# Patient Record
Sex: MALE | Race: BLACK OR AFRICAN AMERICAN | Employment: UNEMPLOYED | URBAN - NONMETROPOLITAN AREA
[De-identification: names, ages, dates, MRNs, and addresses within clinical notes are randomized per-mention and may not be internally consistent; named-entity substitution may affect disease eponyms.]

---

## 2019-11-13 ENCOUNTER — HOSPITAL ENCOUNTER (EMERGENCY)
Age: 29
Discharge: HOME | End: 2019-11-14
Attending: EMERGENCY MEDICINE

## 2019-11-13 DIAGNOSIS — R11.0 NAUSEA: ICD-10-CM

## 2019-11-13 DIAGNOSIS — J10.1 INFLUENZA DUE TO INFLUENZA VIRUS, TYPE B: Primary | ICD-10-CM

## 2019-11-13 PROCEDURE — 99283 EMERGENCY DEPT VISIT LOW MDM: CPT

## 2019-11-13 ASSESSMENT — PAIN SCALES - GENERAL: PAINLEVEL_OUTOF10: 8

## 2019-11-13 ASSESSMENT — PAIN DESCRIPTION - PAIN TYPE: TYPE: ACUTE PAIN

## 2019-11-13 ASSESSMENT — PAIN DESCRIPTION - LOCATION: LOCATION: ABDOMEN

## 2019-11-14 ENCOUNTER — HOSPITAL ENCOUNTER (EMERGENCY)
Age: 29
Discharge: HOME | End: 2019-11-14
Attending: EMERGENCY MEDICINE

## 2019-11-14 VITALS
RESPIRATION RATE: 16 BRPM | BODY MASS INDEX: 29.22 KG/M2 | TEMPERATURE: 99.9 F | HEIGHT: 76 IN | WEIGHT: 240 LBS | HEART RATE: 92 BPM | SYSTOLIC BLOOD PRESSURE: 131 MMHG | DIASTOLIC BLOOD PRESSURE: 83 MMHG | OXYGEN SATURATION: 98 %

## 2019-11-14 VITALS
TEMPERATURE: 98.9 F | HEIGHT: 76 IN | HEART RATE: 91 BPM | RESPIRATION RATE: 16 BRPM | OXYGEN SATURATION: 97 % | BODY MASS INDEX: 29.22 KG/M2 | SYSTOLIC BLOOD PRESSURE: 118 MMHG | DIASTOLIC BLOOD PRESSURE: 94 MMHG | WEIGHT: 240 LBS

## 2019-11-14 DIAGNOSIS — J11.1 INFLUENZA WITH RESPIRATORY MANIFESTATION OTHER THAN PNEUMONIA: Primary | ICD-10-CM

## 2019-11-14 LAB
DIRECT EXAM: ABNORMAL
Lab: ABNORMAL
SPECIMEN DESCRIPTION: ABNORMAL

## 2019-11-14 PROCEDURE — 99282 EMERGENCY DEPT VISIT SF MDM: CPT

## 2019-11-14 PROCEDURE — 6370000000 HC RX 637 (ALT 250 FOR IP): Performed by: EMERGENCY MEDICINE

## 2019-11-14 PROCEDURE — 87804 INFLUENZA ASSAY W/OPTIC: CPT

## 2019-11-14 RX ORDER — ONDANSETRON 4 MG/1
4 TABLET, ORALLY DISINTEGRATING ORAL ONCE
Status: COMPLETED | OUTPATIENT
Start: 2019-11-14 | End: 2019-11-14

## 2019-11-14 RX ORDER — OSELTAMIVIR PHOSPHATE 75 MG/1
75 CAPSULE ORAL 2 TIMES DAILY
Qty: 10 CAPSULE | Refills: 1 | Status: SHIPPED | OUTPATIENT
Start: 2019-11-14 | End: 2019-11-19

## 2019-11-14 RX ORDER — ONDANSETRON 4 MG/1
4 TABLET, ORALLY DISINTEGRATING ORAL EVERY 8 HOURS PRN
Qty: 12 TABLET | Refills: 0 | Status: SHIPPED | OUTPATIENT
Start: 2019-11-14 | End: 2020-12-15

## 2019-11-14 RX ADMIN — ONDANSETRON 4 MG: 4 TABLET, ORALLY DISINTEGRATING ORAL at 00:11

## 2019-11-14 ASSESSMENT — PAIN DESCRIPTION - FREQUENCY: FREQUENCY: CONTINUOUS

## 2019-11-14 ASSESSMENT — PAIN SCALES - GENERAL: PAINLEVEL_OUTOF10: 7

## 2019-11-14 ASSESSMENT — ENCOUNTER SYMPTOMS
VOMITING: 0
NAUSEA: 1
COUGH: 1

## 2020-02-21 ENCOUNTER — APPOINTMENT (OUTPATIENT)
Dept: GENERAL RADIOLOGY | Age: 30
End: 2020-02-21
Payer: OTHER GOVERNMENT

## 2020-02-21 ENCOUNTER — HOSPITAL ENCOUNTER (EMERGENCY)
Age: 30
Discharge: HOME | End: 2020-02-21
Attending: EMERGENCY MEDICINE
Payer: OTHER GOVERNMENT

## 2020-02-21 VITALS
BODY MASS INDEX: 29.22 KG/M2 | OXYGEN SATURATION: 100 % | SYSTOLIC BLOOD PRESSURE: 146 MMHG | DIASTOLIC BLOOD PRESSURE: 102 MMHG | RESPIRATION RATE: 14 BRPM | WEIGHT: 240 LBS | HEIGHT: 76 IN | TEMPERATURE: 97.1 F | HEART RATE: 79 BPM

## 2020-02-21 LAB
-: ABNORMAL
ABSOLUTE EOS #: 0.05 K/UL (ref 0–0.44)
ABSOLUTE IMMATURE GRANULOCYTE: <0.03 K/UL (ref 0–0.3)
ABSOLUTE LYMPH #: 1.3 K/UL (ref 1.1–3.7)
ABSOLUTE MONO #: 0.9 K/UL (ref 0.1–1.2)
ALBUMIN SERPL-MCNC: 5.1 G/DL (ref 3.5–5.2)
ALBUMIN/GLOBULIN RATIO: 1.5 (ref 1–2.5)
ALP BLD-CCNC: 71 U/L (ref 40–129)
ALT SERPL-CCNC: 28 U/L (ref 5–41)
AMORPHOUS: ABNORMAL
ANION GAP SERPL CALCULATED.3IONS-SCNC: 14 MMOL/L (ref 9–17)
AST SERPL-CCNC: 22 U/L
BACTERIA: ABNORMAL
BASOPHILS # BLD: 0 % (ref 0–2)
BASOPHILS ABSOLUTE: <0.03 K/UL (ref 0–0.2)
BILIRUB SERPL-MCNC: 1.33 MG/DL (ref 0.3–1.2)
BILIRUBIN URINE: NEGATIVE
BUN BLDV-MCNC: 13 MG/DL (ref 6–20)
BUN/CREAT BLD: 11 (ref 9–20)
CALCIUM SERPL-MCNC: 9.9 MG/DL (ref 8.6–10.4)
CASTS UA: ABNORMAL /LPF (ref 0–2)
CHLORIDE BLD-SCNC: 96 MMOL/L (ref 98–107)
CO2: 28 MMOL/L (ref 20–31)
COLOR: ABNORMAL
COMMENT UA: ABNORMAL
CREAT SERPL-MCNC: 1.22 MG/DL (ref 0.7–1.2)
CRYSTALS, UA: ABNORMAL /HPF
DIFFERENTIAL TYPE: ABNORMAL
EOSINOPHILS RELATIVE PERCENT: 1 % (ref 1–4)
EPITHELIAL CELLS UA: ABNORMAL /HPF (ref 0–5)
GFR AFRICAN AMERICAN: >60 ML/MIN
GFR NON-AFRICAN AMERICAN: >60 ML/MIN
GFR SERPL CREATININE-BSD FRML MDRD: ABNORMAL ML/MIN/{1.73_M2}
GFR SERPL CREATININE-BSD FRML MDRD: ABNORMAL ML/MIN/{1.73_M2}
GLUCOSE BLD-MCNC: 105 MG/DL (ref 70–99)
GLUCOSE URINE: NEGATIVE
HCT VFR BLD CALC: 41.3 % (ref 40.7–50.3)
HEMOGLOBIN: 14.1 G/DL (ref 13–17)
IMMATURE GRANULOCYTES: 0 %
KETONES, URINE: NEGATIVE
LACTIC ACID: 1.7 MMOL/L (ref 0.5–2.2)
LEUKOCYTE ESTERASE, URINE: ABNORMAL
LIPASE: 17 U/L (ref 13–60)
LYMPHOCYTES # BLD: 16 % (ref 24–43)
MCH RBC QN AUTO: 28.5 PG (ref 25.2–33.5)
MCHC RBC AUTO-ENTMCNC: 34.1 G/DL (ref 25.2–33.5)
MCV RBC AUTO: 83.6 FL (ref 82.6–102.9)
MONOCYTES # BLD: 11 % (ref 3–12)
MUCUS: ABNORMAL
NITRITE, URINE: NEGATIVE
NRBC AUTOMATED: 0 PER 100 WBC
OTHER OBSERVATIONS UA: ABNORMAL
PDW BLD-RTO: 13.1 % (ref 11.8–14.4)
PH UA: 6 (ref 5–6)
PLATELET # BLD: 257 K/UL (ref 138–453)
PLATELET ESTIMATE: ABNORMAL
PMV BLD AUTO: 10.7 FL (ref 8.1–13.5)
POTASSIUM SERPL-SCNC: 3.7 MMOL/L (ref 3.7–5.3)
PROTEIN UA: NEGATIVE
RBC # BLD: 4.94 M/UL (ref 4.21–5.77)
RBC # BLD: ABNORMAL 10*6/UL
RBC UA: ABNORMAL /HPF (ref 0–4)
RENAL EPITHELIAL, UA: ABNORMAL /HPF
SEG NEUTROPHILS: 72 % (ref 36–65)
SEGMENTED NEUTROPHILS ABSOLUTE COUNT: 5.8 K/UL (ref 1.5–8.1)
SODIUM BLD-SCNC: 138 MMOL/L (ref 135–144)
SPECIFIC GRAVITY UA: 1 (ref 1.01–1.02)
TOTAL PROTEIN: 8.5 G/DL (ref 6.4–8.3)
TRICHOMONAS: ABNORMAL
TURBIDITY: ABNORMAL
URINE HGB: NEGATIVE
UROBILINOGEN, URINE: NORMAL
WBC # BLD: 8.1 K/UL (ref 3.5–11.3)
WBC # BLD: ABNORMAL 10*3/UL
WBC UA: ABNORMAL /HPF (ref 0–4)
YEAST: ABNORMAL

## 2020-02-21 PROCEDURE — 85025 COMPLETE CBC W/AUTO DIFF WBC: CPT

## 2020-02-21 PROCEDURE — 83690 ASSAY OF LIPASE: CPT

## 2020-02-21 PROCEDURE — 83605 ASSAY OF LACTIC ACID: CPT

## 2020-02-21 PROCEDURE — 99284 EMERGENCY DEPT VISIT MOD MDM: CPT

## 2020-02-21 PROCEDURE — 96374 THER/PROPH/DIAG INJ IV PUSH: CPT

## 2020-02-21 PROCEDURE — 4500000002 HC ER NO CHARGE

## 2020-02-21 PROCEDURE — 2500000003 HC RX 250 WO HCPCS: Performed by: EMERGENCY MEDICINE

## 2020-02-21 PROCEDURE — 87591 N.GONORRHOEAE DNA AMP PROB: CPT

## 2020-02-21 PROCEDURE — 96375 TX/PRO/DX INJ NEW DRUG ADDON: CPT

## 2020-02-21 PROCEDURE — 96376 TX/PRO/DX INJ SAME DRUG ADON: CPT

## 2020-02-21 PROCEDURE — 74022 RADEX COMPL AQT ABD SERIES: CPT

## 2020-02-21 PROCEDURE — 80053 COMPREHEN METABOLIC PANEL: CPT

## 2020-02-21 PROCEDURE — 81001 URINALYSIS AUTO W/SCOPE: CPT

## 2020-02-21 PROCEDURE — 87491 CHLMYD TRACH DNA AMP PROBE: CPT

## 2020-02-21 PROCEDURE — 6360000002 HC RX W HCPCS: Performed by: EMERGENCY MEDICINE

## 2020-02-21 PROCEDURE — 2580000003 HC RX 258: Performed by: EMERGENCY MEDICINE

## 2020-02-21 RX ORDER — ONDANSETRON 2 MG/ML
4 INJECTION INTRAMUSCULAR; INTRAVENOUS ONCE
Status: COMPLETED | OUTPATIENT
Start: 2020-02-21 | End: 2020-02-21

## 2020-02-21 RX ORDER — FAMOTIDINE 20 MG/1
20 TABLET, FILM COATED ORAL 2 TIMES DAILY
Qty: 28 TABLET | Refills: 0 | Status: SHIPPED | OUTPATIENT
Start: 2020-02-21 | End: 2020-12-15

## 2020-02-21 RX ORDER — PROMETHAZINE HYDROCHLORIDE 6.25 MG/5ML
25 SYRUP ORAL EVERY 6 HOURS PRN
Qty: 240 ML | Refills: 0 | Status: SHIPPED | OUTPATIENT
Start: 2020-02-21 | End: 2020-02-28

## 2020-02-21 RX ORDER — 0.9 % SODIUM CHLORIDE 0.9 %
1000 INTRAVENOUS SOLUTION INTRAVENOUS ONCE
Status: COMPLETED | OUTPATIENT
Start: 2020-02-21 | End: 2020-02-21

## 2020-02-21 RX ADMIN — ONDANSETRON 4 MG: 2 INJECTION INTRAMUSCULAR; INTRAVENOUS at 22:31

## 2020-02-21 RX ADMIN — ONDANSETRON 4 MG: 2 INJECTION INTRAMUSCULAR; INTRAVENOUS at 21:19

## 2020-02-21 RX ADMIN — FAMOTIDINE 20 MG: 10 INJECTION, SOLUTION INTRAVENOUS at 21:23

## 2020-02-21 RX ADMIN — SODIUM CHLORIDE 1000 ML: 9 INJECTION, SOLUTION INTRAVENOUS at 21:19

## 2020-02-21 ASSESSMENT — PAIN DESCRIPTION - LOCATION: LOCATION: ABDOMEN

## 2020-02-21 ASSESSMENT — PAIN DESCRIPTION - ORIENTATION: ORIENTATION: LEFT

## 2020-02-21 ASSESSMENT — PAIN SCALES - GENERAL: PAINLEVEL_OUTOF10: 6

## 2020-02-21 ASSESSMENT — PAIN DESCRIPTION - PAIN TYPE: TYPE: ACUTE PAIN

## 2020-02-21 ASSESSMENT — PAIN DESCRIPTION - FREQUENCY: FREQUENCY: INTERMITTENT

## 2020-02-21 ASSESSMENT — PAIN DESCRIPTION - DESCRIPTORS: DESCRIPTORS: ACHING

## 2020-02-22 ASSESSMENT — ENCOUNTER SYMPTOMS
SHORTNESS OF BREATH: 0
NAUSEA: 1
VOMITING: 1

## 2020-02-22 NOTE — ED PROVIDER NOTES
[facility] ED  [address]  [place] [address]  Phone: [phone]  eMERGENCY dEPARTMENT eNCOUnter      Pt Name: Ina Edmonds  MRN: [id number]  [place] 1990  Date of evaluation: 2/22/20      CHIEF COMPLAINT     Chief Complaint   Patient presents with    Emesis    Cough    Abdominal Pain         HISTORY OF PRESENT ILLNESS    Ina Edmonds is a 30 y.o. male who presents today with complaints of multiple episodes of emesis and some abdominal discomfort. He also states that he had a cough however he describes this more as hiccuping previous to his emesis episodes. Patient endorses a history of Crohn's and has had a colectomy in the past.  Patient is not currently under a GI specialist's care as he recently moved from [place]. Patient states that he was not supposed to be on medication for his Crohn's disease at this time. Patient was seen at [facility] earlier today and diagnosed with a viral syndrome. Patient does have Zofran at home but did not take that previous to going to the emergency department. He reports that he also has a prescription for liquid Phenergan which tends to work better for him but he is out of that prescription. He does not have any fevers no productive cough no chest pain or difficulty breathing. Previous history of abdominal surgeries. He denies any pain or swelling in the private area. He denies any UTI symptoms. Patient states does not feel like his previous Crohn's flareups. Has not noticed any blood in his stool. REVIEW OF SYSTEMS     Review of Systems   Constitutional: Negative for fever. HENT: Negative for congestion. Respiratory: Negative for shortness of breath. Cardiovascular: Negative for chest pain. Gastrointestinal: Positive for nausea and vomiting. Skin: Negative for rash. Neurological: Negative for syncope. Psychiatric/Behavioral: Negative for confusion.    All other systems reviewed and are negative. PAST MEDICAL HISTORY    has a past medical history of Crohn's colitis ([facility] [address] SURGICAL HISTORY      has a past surgical history that includes Colon surgery. CURRENT MEDICATIONS       Discharge Medication List as of 2/21/2020 10:59 PM      CONTINUE these medications which have NOT CHANGED    Details   ondansetron (ZOFRAN ODT) 4 MG disintegrating tablet Take 1 tablet by mouth every 8 hours as needed for Nausea, Disp-12 tablet, R-0Print             ALLERGIES     has No Known Allergies. FAMILY HISTORY     has no family status information on file. family history is not on file. SOCIAL HISTORY      reports that he has been smoking cigarettes. He has been smoking about 0.50 packs per day. He has never used smokeless tobacco. He reports current alcohol use. He reports previous drug use. PHYSICAL EXAM     INITIAL VITALS:  height is 6' 4\" (1.93 m) and weight is 240 lb (108.9 kg). His tympanic temperature is 97.1 °F (36.2 °C). His blood pressure is 146/102 (abnormal) and his pulse is 79. His respiration is 14 and oxygen saturation is 100%. Physical Exam  Vitals signs reviewed. Constitutional:       Appearance: Normal appearance. HENT:      Head: Normocephalic. Mouth/Throat:      Mouth: Mucous membranes are moist.   Eyes:      Extraocular Movements: Extraocular movements intact. Pupils: Pupils are equal, round, and reactive to light. Cardiovascular:      Rate and Rhythm: Normal rate and regular rhythm. Heart sounds: Normal heart sounds. Pulmonary:      Effort: Pulmonary effort is normal.      Breath sounds: Normal breath sounds. No wheezing. Abdominal:      Palpations: Abdomen is soft. Tenderness: There is no abdominal tenderness. There is no guarding or rebound. Musculoskeletal: Normal range of motion. Right lower leg: No edema. Left lower leg: No edema. Skin:     General: Skin is warm and dry.       Capillary Refill: Capillary refill takes less Morphology NOT REPORTED     Platelet Estimate NOT REPORTED     Seg Neutrophils 72 (H) 36 - 65 %    Lymphocytes 16 (L) 24 - 43 %    Monocytes 11 3 - 12 %    Eosinophils % 1 1 - 4 %    Basophils 0 0 - 2 %    Immature Granulocytes 0 0 %    Segs Absolute 5.80 1.50 - 8.10 k/uL    Absolute Lymph # 1.30 1.10 - 3.70 k/uL    Absolute Mono # 0.90 0.10 - 1.20 k/uL    Absolute Eos # 0.05 0.00 - 0.44 k/uL    Basophils Absolute <0.03 0.00 - 0.20 k/uL    Absolute Immature Granulocyte <0.03 0.00 - 0.30 k/uL   Comprehensive Metabolic Panel   Result Value Ref Range    Glucose 105 (H) 70 - 99 mg/dL    BUN 13 6 - 20 mg/dL    CREATININE 1.22 (H) 0.70 - 1.20 mg/dL    Bun/Cre Ratio 11 9 - 20    Calcium 9.9 8.6 - 10.4 mg/dL    Sodium 138 135 - 144 mmol/L    Potassium 3.7 3.7 - 5.3 mmol/L    Chloride 96 (L) 98 - 107 mmol/L    CO2 28 20 - 31 mmol/L    Anion Gap 14 9 - 17 mmol/L    Alkaline Phosphatase 71 40 - 129 U/L    ALT 28 5 - 41 U/L    AST 22 <40 U/L    Total Bilirubin 1.33 (H) 0.3 - 1.2 mg/dL    Total Protein 8.5 (H) 6.4 - 8.3 g/dL    Alb 5.1 3.5 - 5.2 g/dL    Albumin/Globulin Ratio 1.5 1.0 - 2.5    GFR Non-African American >60 >60 mL/min    GFR African American >60 >60 mL/min    GFR Comment          GFR Staging NOT REPORTED    Lipase   Result Value Ref Range    Lipase 17 13 - 60 U/L   Lactic Acid   Result Value Ref Range    Lactic Acid 1.7 0.5 - 2.2 mmol/L   Urinalysis Reflex to Culture   Result Value Ref Range    Color, UA NOT REPORTED YELLOW    Turbidity UA NOT REPORTED CLEAR    Glucose, Ur NEGATIVE NEGATIVE    Bilirubin Urine NEGATIVE NEGATIVE    Ketones, Urine NEGATIVE NEGATIVE    Specific Gravity, UA 1.005 (L) 1.010 - 1.025    Urine Hgb NEGATIVE NEGATIVE    pH, UA 6.0 5.0 - 6.0    Protein, UA NEGATIVE NEGATIVE    Urobilinogen, Urine Normal Normal    Nitrite, Urine NEGATIVE NEGATIVE    Leukocyte Esterase, Urine TRACE (A) NEGATIVE    Urinalysis Comments NOT REPORTED    Microscopic Urinalysis   Result Value Ref Range    - WBC, UA 0 TO 4 0 - 4 /HPF    RBC, UA 0 TO 4 0 - 4 /HPF    Casts UA NOT REPORTED 0 - 2 /LPF    Crystals UA NOT REPORTED None /HPF    Epithelial Cells UA 0 TO 4 0 - 5 /HPF    Renal Epithelial, Urine NOT REPORTED 0 /HPF    Bacteria, UA TRACE (A) None    Mucus, UA 1+ (A) None    Trichomonas, UA NOT REPORTED None    Amorphous, UA NOT REPORTED None    Other Observations UA NOT REPORTED NOT REQ. Yeast, UA NOT REPORTED None       EMERGENCY DEPARTMENT COURSE:   Vitals:    Vitals:    02/21/20 2028 02/21/20 2126 02/21/20 2305   BP: (!) 140/96 (!) 158/89 (!) 146/102   Pulse: 81 80 79   Resp: 16 16 14   Temp: 97.1 °F (36.2 °C)     TempSrc: Tympanic     SpO2: 100% 100% 100%   Weight: 240 lb (108.9 kg)     Height: 6' 4\" (1.93 m)       -------------------------  BP: (!) 146/102, Temp: 97.1 °F (36.2 °C), Pulse: 79, Resp: 14      CONSULTS:  None    PROCEDURES:  None    FINAL IMPRESSION      1.  Nausea and vomiting, intractability of vomiting not specified, unspecified vomiting type          DISPOSITION/PLAN   DISPOSITION Decision To Discharge 02/21/2020 10:57:07 PM      PATIENT REFERRED TO:  [facility] [address] [id number] [phone]  In 2 days        DISCHARGE MEDICATIONS:  Discharge Medication List as of 2/21/2020 10:59 PM      START taking these medications    Details   promethazine (PHENERGAN) 6.25 MG/5ML syrup Take 20 mLs by mouth every 6 hours as needed for Nausea, Disp-240 mL, R-0Print      famotidine (PEPCID) 20 MG tablet Take 1 tablet by mouth 2 times daily for 14 days, Disp-28 tablet, R-0Print               (Please note that portions of this note were completed with avoice recognition program.  Efforts were made to edit the dictations but occasionally words are mis-transcribed.)    Deepak Carolina MD, [address] [employer] [facility] [facility]  Attending Emergency Medicine Physician        Deepak Carolina MD  02/22/20 0765

## 2020-02-24 LAB
C. TRACHOMATIS DNA ,URINE: NEGATIVE
N. GONORRHOEAE DNA, URINE: NEGATIVE
SPECIMEN DESCRIPTION: NORMAL

## 2020-03-18 ENCOUNTER — HOSPITAL ENCOUNTER (EMERGENCY)
Age: 30
Discharge: HOME | End: 2020-03-18
Attending: EMERGENCY MEDICINE
Payer: OTHER GOVERNMENT

## 2020-03-18 ENCOUNTER — APPOINTMENT (OUTPATIENT)
Dept: CT IMAGING | Age: 30
End: 2020-03-18
Payer: OTHER GOVERNMENT

## 2020-03-18 ENCOUNTER — APPOINTMENT (OUTPATIENT)
Dept: ULTRASOUND IMAGING | Age: 30
End: 2020-03-18
Payer: OTHER GOVERNMENT

## 2020-03-18 VITALS
BODY MASS INDEX: 28.01 KG/M2 | HEIGHT: 76 IN | SYSTOLIC BLOOD PRESSURE: 139 MMHG | TEMPERATURE: 97.8 F | HEART RATE: 67 BPM | OXYGEN SATURATION: 100 % | DIASTOLIC BLOOD PRESSURE: 88 MMHG | RESPIRATION RATE: 16 BRPM | WEIGHT: 230 LBS

## 2020-03-18 LAB
ABSOLUTE EOS #: 0.21 K/UL (ref 0–0.44)
ABSOLUTE IMMATURE GRANULOCYTE: <0.03 K/UL (ref 0–0.3)
ABSOLUTE LYMPH #: 1.34 K/UL (ref 1.1–3.7)
ABSOLUTE MONO #: 0.58 K/UL (ref 0.1–1.2)
ALBUMIN SERPL-MCNC: 4.6 G/DL (ref 3.5–5.2)
ALBUMIN/GLOBULIN RATIO: 1.5 (ref 1–2.5)
ALP BLD-CCNC: 68 U/L (ref 40–129)
ALT SERPL-CCNC: 36 U/L (ref 5–41)
ANION GAP SERPL CALCULATED.3IONS-SCNC: 14 MMOL/L (ref 9–17)
AST SERPL-CCNC: 22 U/L
BASOPHILS # BLD: 0 % (ref 0–2)
BASOPHILS ABSOLUTE: <0.03 K/UL (ref 0–0.2)
BILIRUB SERPL-MCNC: 1.08 MG/DL (ref 0.3–1.2)
BUN BLDV-MCNC: 9 MG/DL (ref 6–20)
BUN/CREAT BLD: 10 (ref 9–20)
CALCIUM SERPL-MCNC: 9.9 MG/DL (ref 8.6–10.4)
CHLORIDE BLD-SCNC: 95 MMOL/L (ref 98–107)
CO2: 27 MMOL/L (ref 20–31)
CREAT SERPL-MCNC: 0.91 MG/DL (ref 0.7–1.2)
DIFFERENTIAL TYPE: ABNORMAL
EOSINOPHILS RELATIVE PERCENT: 4 % (ref 1–4)
GFR AFRICAN AMERICAN: >60 ML/MIN
GFR NON-AFRICAN AMERICAN: >60 ML/MIN
GFR SERPL CREATININE-BSD FRML MDRD: ABNORMAL ML/MIN/{1.73_M2}
GFR SERPL CREATININE-BSD FRML MDRD: ABNORMAL ML/MIN/{1.73_M2}
GLUCOSE BLD-MCNC: 114 MG/DL (ref 70–99)
HCT VFR BLD CALC: 42.5 % (ref 40.7–50.3)
HEMOGLOBIN: 14.5 G/DL (ref 13–17)
IMMATURE GRANULOCYTES: 0 %
LIPASE: 14 U/L (ref 13–60)
LYMPHOCYTES # BLD: 25 % (ref 24–43)
MCH RBC QN AUTO: 28.5 PG (ref 25.2–33.5)
MCHC RBC AUTO-ENTMCNC: 34.1 G/DL (ref 25.2–33.5)
MCV RBC AUTO: 83.5 FL (ref 82.6–102.9)
MONOCYTES # BLD: 11 % (ref 3–12)
NRBC AUTOMATED: 0 PER 100 WBC
PDW BLD-RTO: 12.9 % (ref 11.8–14.4)
PLATELET # BLD: 264 K/UL (ref 138–453)
PLATELET ESTIMATE: ABNORMAL
PMV BLD AUTO: 10.5 FL (ref 8.1–13.5)
POTASSIUM SERPL-SCNC: 3.9 MMOL/L (ref 3.7–5.3)
RBC # BLD: 5.09 M/UL (ref 4.21–5.77)
RBC # BLD: ABNORMAL 10*6/UL
SEG NEUTROPHILS: 60 % (ref 36–65)
SEGMENTED NEUTROPHILS ABSOLUTE COUNT: 3.18 K/UL (ref 1.5–8.1)
SODIUM BLD-SCNC: 136 MMOL/L (ref 135–144)
TOTAL PROTEIN: 7.7 G/DL (ref 6.4–8.3)
WBC # BLD: 5.4 K/UL (ref 3.5–11.3)
WBC # BLD: ABNORMAL 10*3/UL

## 2020-03-18 PROCEDURE — 80053 COMPREHEN METABOLIC PANEL: CPT

## 2020-03-18 PROCEDURE — 74177 CT ABD & PELVIS W/CONTRAST: CPT

## 2020-03-18 PROCEDURE — 6360000004 HC RX CONTRAST MEDICATION: Performed by: EMERGENCY MEDICINE

## 2020-03-18 PROCEDURE — 85025 COMPLETE CBC W/AUTO DIFF WBC: CPT

## 2020-03-18 PROCEDURE — 6360000002 HC RX W HCPCS: Performed by: EMERGENCY MEDICINE

## 2020-03-18 PROCEDURE — 96376 TX/PRO/DX INJ SAME DRUG ADON: CPT

## 2020-03-18 PROCEDURE — 2580000003 HC RX 258: Performed by: EMERGENCY MEDICINE

## 2020-03-18 PROCEDURE — 76705 ECHO EXAM OF ABDOMEN: CPT

## 2020-03-18 PROCEDURE — 2500000003 HC RX 250 WO HCPCS: Performed by: EMERGENCY MEDICINE

## 2020-03-18 PROCEDURE — 96375 TX/PRO/DX INJ NEW DRUG ADDON: CPT

## 2020-03-18 PROCEDURE — 96374 THER/PROPH/DIAG INJ IV PUSH: CPT

## 2020-03-18 PROCEDURE — 99284 EMERGENCY DEPT VISIT MOD MDM: CPT

## 2020-03-18 PROCEDURE — 83690 ASSAY OF LIPASE: CPT

## 2020-03-18 RX ORDER — PREDNISONE 10 MG/1
10 TABLET ORAL SEE ADMIN INSTRUCTIONS
Qty: 30 TABLET | Refills: 0 | Status: SHIPPED | OUTPATIENT
Start: 2020-03-18 | End: 2020-03-28

## 2020-03-18 RX ORDER — MORPHINE SULFATE 4 MG/ML
4 INJECTION, SOLUTION INTRAMUSCULAR; INTRAVENOUS ONCE
Status: COMPLETED | OUTPATIENT
Start: 2020-03-18 | End: 2020-03-18

## 2020-03-18 RX ORDER — OXYCODONE HYDROCHLORIDE AND ACETAMINOPHEN 5; 325 MG/1; MG/1
1 TABLET ORAL EVERY 6 HOURS PRN
Qty: 12 TABLET | Refills: 0 | Status: SHIPPED | OUTPATIENT
Start: 2020-03-18 | End: 2020-03-21

## 2020-03-18 RX ORDER — ONDANSETRON 4 MG/1
4 TABLET, FILM COATED ORAL EVERY 8 HOURS PRN
Qty: 15 TABLET | Refills: 0 | Status: SHIPPED | OUTPATIENT
Start: 2020-03-18 | End: 2020-03-23

## 2020-03-18 RX ORDER — ONDANSETRON 2 MG/ML
4 INJECTION INTRAMUSCULAR; INTRAVENOUS ONCE
Status: COMPLETED | OUTPATIENT
Start: 2020-03-18 | End: 2020-03-18

## 2020-03-18 RX ORDER — KETOROLAC TROMETHAMINE 30 MG/ML
15 INJECTION, SOLUTION INTRAMUSCULAR; INTRAVENOUS ONCE
Status: COMPLETED | OUTPATIENT
Start: 2020-03-18 | End: 2020-03-18

## 2020-03-18 RX ORDER — METHYLPREDNISOLONE SODIUM SUCCINATE 125 MG/2ML
125 INJECTION, POWDER, LYOPHILIZED, FOR SOLUTION INTRAMUSCULAR; INTRAVENOUS ONCE
Status: COMPLETED | OUTPATIENT
Start: 2020-03-18 | End: 2020-03-18

## 2020-03-18 RX ORDER — 0.9 % SODIUM CHLORIDE 0.9 %
1000 INTRAVENOUS SOLUTION INTRAVENOUS ONCE
Status: COMPLETED | OUTPATIENT
Start: 2020-03-18 | End: 2020-03-18

## 2020-03-18 RX ADMIN — KETOROLAC TROMETHAMINE 15 MG: 30 INJECTION, SOLUTION INTRAMUSCULAR at 12:07

## 2020-03-18 RX ADMIN — SODIUM CHLORIDE 1000 ML: 9 INJECTION, SOLUTION INTRAVENOUS at 12:05

## 2020-03-18 RX ADMIN — MORPHINE SULFATE 4 MG: 4 INJECTION, SOLUTION INTRAMUSCULAR; INTRAVENOUS at 13:19

## 2020-03-18 RX ADMIN — FAMOTIDINE 20 MG: 10 INJECTION, SOLUTION INTRAVENOUS at 12:13

## 2020-03-18 RX ADMIN — METHYLPREDNISOLONE SODIUM SUCCINATE 125 MG: 125 INJECTION, POWDER, FOR SOLUTION INTRAMUSCULAR; INTRAVENOUS at 13:23

## 2020-03-18 RX ADMIN — MORPHINE SULFATE 4 MG: 4 INJECTION, SOLUTION INTRAMUSCULAR; INTRAVENOUS at 12:10

## 2020-03-18 RX ADMIN — IOPAMIDOL 100 ML: 755 INJECTION, SOLUTION INTRAVENOUS at 12:48

## 2020-03-18 RX ADMIN — ONDANSETRON 4 MG: 2 INJECTION INTRAMUSCULAR; INTRAVENOUS at 12:05

## 2020-03-18 ASSESSMENT — PAIN DESCRIPTION - PROGRESSION: CLINICAL_PROGRESSION: GRADUALLY WORSENING

## 2020-03-18 ASSESSMENT — ENCOUNTER SYMPTOMS
DIARRHEA: 0
NAUSEA: 1
SORE THROAT: 0
ABDOMINAL PAIN: 1
SHORTNESS OF BREATH: 0
VOMITING: 1

## 2020-03-18 ASSESSMENT — PAIN SCALES - GENERAL
PAINLEVEL_OUTOF10: 9
PAINLEVEL_OUTOF10: 8
PAINLEVEL_OUTOF10: 9
PAINLEVEL_OUTOF10: 4
PAINLEVEL_OUTOF10: 4
PAINLEVEL_OUTOF10: 9

## 2020-03-18 ASSESSMENT — PAIN DESCRIPTION - FREQUENCY: FREQUENCY: CONTINUOUS

## 2020-03-18 ASSESSMENT — PAIN DESCRIPTION - LOCATION: LOCATION: ABDOMEN

## 2020-03-18 ASSESSMENT — PAIN DESCRIPTION - PAIN TYPE: TYPE: ACUTE PAIN

## 2020-03-18 ASSESSMENT — PAIN - FUNCTIONAL ASSESSMENT: PAIN_FUNCTIONAL_ASSESSMENT: 0-10

## 2020-03-18 ASSESSMENT — PAIN DESCRIPTION - DESCRIPTORS: DESCRIPTORS: SHARP;ACHING

## 2020-03-18 ASSESSMENT — PAIN DESCRIPTION - ONSET: ONSET: ON-GOING

## 2020-03-18 NOTE — ED PROVIDER NOTES
[facility] ED  [address]  [place] [address]  Phone: [address] [place] [facility]  eMERGENCY dEPARTMENT eNCOUnter      Pt Name: Santy Cervantes  MRN: [id number]  [place] 1990  Date of evaluation: 3/18/2020  Provider: Nanda Callahna DO    CHIEF COMPLAINT       Chief Complaint   Patient presents with    Abdominal Pain     onset \"2 days ago, been vomiting too\"         HISTORY OF PRESENT ILLNESS   (Location/Symptom, Timing/Onset,Context/Setting, Quality, Duration, Modifying Factors, Severity)  Note limiting factors. Santy Cervantes is a 30 y.o. male who presents to the emergency department for evaluation of abdominal pain. Patient states this started approximately 2 days ago, it is been fairly persistent. He notices pain/cramping in the lower abdomen, he is also having nausea and vomiting. No blood in vomit. No diarrhea. No fever. Patient does have history of Crohn's disease and he had been on immunomodulators in the past but it has been years. Patient has been doing well, last flareup was approximately 1 to 2 years ago according to patient. Patient does have history of small bowel resection secondary to Crohn's ileitis. Patient is not taking any medicine at home for the pain. Patient denies dysuria or hematuria. Nursing Notes were reviewed. REVIEW OF SYSTEMS    (2-9systems for level 4, 10 or more for level 5)     Review of Systems   Constitutional: Negative for fever. HENT: Negative for sore throat. Respiratory: Negative for shortness of breath. Cardiovascular: Negative for chest pain. Gastrointestinal: Positive for abdominal pain, nausea and vomiting. Negative for diarrhea. Genitourinary: Negative for dysuria. Skin: Negative for rash. Neurological: Negative for weakness. All other systems reviewed and are negative. Except asnoted above the remainder of the review of systems was reviewed and negative.        PAST MEDICAL 1137]   BP Temp Temp Source Pulse Resp SpO2 Height Weight   (!) 151/87 97.8 °F (36.6 °C) Tympanic 71 16 98 % 6' 4\" (1.93 m) 230 lb (104.3 kg)       Physical Exam  Vitals signs and nursing note reviewed. Constitutional:       General: He is not in acute distress. Appearance: Normal appearance. He is not ill-appearing or toxic-appearing. HENT:      Head: Normocephalic and atraumatic. Nose: Nose normal. No congestion. Mouth/Throat:      Mouth: Mucous membranes are moist.   Eyes:      General:         Right eye: No discharge. Left eye: No discharge. Conjunctiva/sclera: Conjunctivae normal.   Neck:      Musculoskeletal: Normal range of motion. Cardiovascular:      Rate and Rhythm: Normal rate and regular rhythm. Pulses: Normal pulses. Heart sounds: Normal heart sounds. No murmur. Pulmonary:      Effort: Pulmonary effort is normal. No respiratory distress. Breath sounds: Normal breath sounds. No wheezing. Abdominal:      General: Abdomen is flat. There is no distension. Palpations: Abdomen is soft. There is no mass. Tenderness: There is abdominal tenderness. There is no guarding or rebound. Comments: Patient has very mild lower abdominal tenderness in the left and right lower quadrants. No McBurney's point tenderness. Seems to be a little bit more tender on the right than the left. Musculoskeletal: Normal range of motion. General: No deformity or signs of injury. Skin:     General: Skin is warm and dry. Capillary Refill: Capillary refill takes less than 2 seconds. Findings: No rash. Neurological:      General: No focal deficit present. Mental Status: He is alert. Mental status is at baseline. Motor: No weakness. Comments: Speaking normally. No facial asymmetry. Moving all 4 extremities. Normal gait.     Psychiatric:         Mood and Affect: Mood normal.         EMERGENCY DEPARTMENT COURSE and DIFFERENTIAL DIAGNOSIS/MDM:   Vitals:    Vitals:    03/18/20 1137 03/18/20 1257   BP: (!) 151/87 133/62   Pulse: 71 62   Resp: 16 16   Temp: 97.8 °F (36.6 °C)    TempSrc: Tympanic    SpO2: 98% 98%   Weight: 230 lb (104.3 kg)    Height: 6' 4\" (1.93 m)        Patient presents to the emergency department with the complaints described above. Vital signs show slight hypertension but are otherwise unremarkable. Physical examination reveals the findings discussed above. At this time, differential diagnosis is broad and could include a Crohn's flareup, could include other underlying ileitis or colitis, lower suspicion for appendicitis, pancreatitis or vascular abnormality. I am going to obtain blood work and a CT scan with contrast of the abdomen and pelvis. I will order urinalysis as well. I ordered IV fluids, IV pain meds, IV antiemetics, IV antacid and I will reevaluate      DIAGNOSTIC RESULTS     LABS:  Labs Reviewed   CBC WITH AUTO DIFFERENTIAL - Abnormal; Notable for the following components:       Result Value    MCHC 34.1 (*)     All other components within normal limits   COMPREHENSIVE METABOLIC PANEL - Abnormal; Notable for the following components:    Glucose 114 (*)     Chloride 95 (*)     All other components within normal limits   LIPASE   URINE RT REFLEX TO CULTURE       All other labs were within normal range or not returned as of this dictation. RADIOLOGY:  US GALLBLADDER RUQ   Final Result   1. No fluid is seen along the right hemidiaphragm. Finding on the CT study   likely represents asymmetrical muscular thickening of the right hemidiaphragm. 2. Unremarkable right upper quadrant ultrasound. CT ABDOMEN PELVIS W IV CONTRAST Additional Contrast? None   Final Result   1. There is questionable asymmetrical thickening of the right hemidiaphragm   when compared to the left.   If underlying loculated fluid or possibly   hemoperitoneum adjacent to the liver is of clinical concern, further   evaluation with ultrasound is recommended. Otherwise unremarkable study. ED Course as of Mar 18 1453   Wed Mar 18, 2020   1317 Reevaluation patient has no significant improvement. I have ordered another dose of pain medication I will give some steroids. Ordered a right upper quadrant ultrasound based on some thickening of the right hemidiaphragm on CT scan    [TS]   1449 Ultrasound unremarkable. At this time, patient is going to be sent home with short course of steroid and pain medication. Advised to follow-up with PCP and establish GI. Of course he can return if worse. At this time the patient is without objective evidence of an acute process requiring hospitalization or inpatient management. They have remained hemodynamically stable and are stable for discharge with outpatient follow-up. Standard anticipatory guidance given to patient upon discharge. Have given them a specific time frame in which to follow-up and who to follow-up with. I have also advised them that they should return to the emergency department if they get worse, or not getting better or develop any new or concerning symptoms. Patient demonstrates understanding.    [TS]      ED Course User Index  [TS] Mynor Page, DO         PROCEDURES:  Unless otherwise noted below, none     Procedures    FINAL IMPRESSION      1. Exacerbation of Crohn's disease without complication ([facility] [address]    2. Lower abdominal pain          DISPOSITION/PLAN   DISPOSITION        PATIENT REFERRED TO:  Establish a Primary physician  If you do not have a primary care physician or you are looking for a new physician, please contact the following number [phone] to establish one.           DISCHARGE MEDICATIONS:  New Prescriptions    ONDANSETRON (ZOFRAN) 4 MG TABLET    Take 1 tablet by mouth every 8 hours as needed for Nausea or Vomiting    OXYCODONE-ACETAMINOPHEN (PERCOCET) 5-325 MG PER TABLET    Take 1 tablet by mouth every 6 hours as needed for Pain for up to 3 days. Intended supply: 3 days. Take lowest dose possible to manage pain    PREDNISONE (DELTASONE) 10 MG TABLET    Take 1 tablet by mouth See Admin Instructions for 10 days Take 4 tablets by mouth daily for days 1-4   Take 3 tablets by mouth daily for days 5-7   Take 2 tablets by mouth daily days 8 and 9   Take 1 tablet by mouth on day 10     Periodic Controlled Substance Monitoring: Possible medication side effects, risk of tolerance/dependence & alternative treatments discussed., No signs of potential drug abuse or diversion identified.  (Nanda Callahan DO)    (Please note that portions of this note were completed with a voice recognition program.  Efforts were made to edit the dictations but occasionally words are mis-transcribed.)    Nanda Callahan DO (electronically signed)  Board Certified Emergency Physician         Nanda Callahan DO  03/18/20 [id number]

## 2020-04-12 ENCOUNTER — HOSPITAL ENCOUNTER (EMERGENCY)
Age: 30
Discharge: HOME | End: 2020-04-12
Attending: EMERGENCY MEDICINE
Payer: OTHER GOVERNMENT

## 2020-04-12 ENCOUNTER — APPOINTMENT (OUTPATIENT)
Dept: GENERAL RADIOLOGY | Age: 30
End: 2020-04-12
Payer: OTHER GOVERNMENT

## 2020-04-12 VITALS
WEIGHT: 220 LBS | DIASTOLIC BLOOD PRESSURE: 71 MMHG | BODY MASS INDEX: 26.79 KG/M2 | RESPIRATION RATE: 14 BRPM | TEMPERATURE: 97.2 F | HEART RATE: 66 BPM | SYSTOLIC BLOOD PRESSURE: 118 MMHG | OXYGEN SATURATION: 100 % | HEIGHT: 76 IN

## 2020-04-12 LAB
ABSOLUTE EOS #: 0.19 K/UL (ref 0–0.44)
ABSOLUTE IMMATURE GRANULOCYTE: <0.03 K/UL (ref 0–0.3)
ABSOLUTE LYMPH #: 1.12 K/UL (ref 1.1–3.7)
ABSOLUTE MONO #: 0.49 K/UL (ref 0.1–1.2)
ALBUMIN SERPL-MCNC: 4.6 G/DL (ref 3.5–5.2)
ALBUMIN/GLOBULIN RATIO: 1.5 (ref 1–2.5)
ALP BLD-CCNC: 67 U/L (ref 40–129)
ALT SERPL-CCNC: 43 U/L (ref 5–41)
AMYLASE: 53 U/L (ref 28–100)
ANION GAP SERPL CALCULATED.3IONS-SCNC: 15 MMOL/L (ref 9–17)
AST SERPL-CCNC: 22 U/L
BASOPHILS # BLD: 1 % (ref 0–2)
BASOPHILS ABSOLUTE: 0.03 K/UL (ref 0–0.2)
BILIRUB SERPL-MCNC: 1.24 MG/DL (ref 0.3–1.2)
BUN BLDV-MCNC: 9 MG/DL (ref 6–20)
BUN/CREAT BLD: 9 (ref 9–20)
CALCIUM SERPL-MCNC: 9.7 MG/DL (ref 8.6–10.4)
CHLORIDE BLD-SCNC: 100 MMOL/L (ref 98–107)
CO2: 24 MMOL/L (ref 20–31)
CREAT SERPL-MCNC: 0.96 MG/DL (ref 0.7–1.2)
DIFFERENTIAL TYPE: ABNORMAL
EOSINOPHILS RELATIVE PERCENT: 3 % (ref 1–4)
GFR AFRICAN AMERICAN: >60 ML/MIN
GFR NON-AFRICAN AMERICAN: >60 ML/MIN
GFR SERPL CREATININE-BSD FRML MDRD: ABNORMAL ML/MIN/{1.73_M2}
GFR SERPL CREATININE-BSD FRML MDRD: ABNORMAL ML/MIN/{1.73_M2}
GLUCOSE BLD-MCNC: 130 MG/DL (ref 70–99)
HCT VFR BLD CALC: 42.7 % (ref 40.7–50.3)
HEMOGLOBIN: 14.3 G/DL (ref 13–17)
IMMATURE GRANULOCYTES: 0 %
LACTIC ACID: 3 MMOL/L (ref 0.5–2.2)
LIPASE: 19 U/L (ref 13–60)
LYMPHOCYTES # BLD: 20 % (ref 24–43)
MCH RBC QN AUTO: 28.2 PG (ref 25.2–33.5)
MCHC RBC AUTO-ENTMCNC: 33.5 G/DL (ref 25.2–33.5)
MCV RBC AUTO: 84.2 FL (ref 82.6–102.9)
MONOCYTES # BLD: 9 % (ref 3–12)
NRBC AUTOMATED: 0 PER 100 WBC
PDW BLD-RTO: 13 % (ref 11.8–14.4)
PLATELET # BLD: 260 K/UL (ref 138–453)
PLATELET ESTIMATE: ABNORMAL
PMV BLD AUTO: 10.8 FL (ref 8.1–13.5)
POTASSIUM SERPL-SCNC: 3.9 MMOL/L (ref 3.7–5.3)
RBC # BLD: 5.07 M/UL (ref 4.21–5.77)
RBC # BLD: ABNORMAL 10*6/UL
SEG NEUTROPHILS: 68 % (ref 36–65)
SEGMENTED NEUTROPHILS ABSOLUTE COUNT: 3.87 K/UL (ref 1.5–8.1)
SODIUM BLD-SCNC: 139 MMOL/L (ref 135–144)
TOTAL PROTEIN: 7.7 G/DL (ref 6.4–8.3)
WBC # BLD: 5.7 K/UL (ref 3.5–11.3)
WBC # BLD: ABNORMAL 10*3/UL

## 2020-04-12 PROCEDURE — 80053 COMPREHEN METABOLIC PANEL: CPT

## 2020-04-12 PROCEDURE — 85025 COMPLETE CBC W/AUTO DIFF WBC: CPT

## 2020-04-12 PROCEDURE — 83605 ASSAY OF LACTIC ACID: CPT

## 2020-04-12 PROCEDURE — 96374 THER/PROPH/DIAG INJ IV PUSH: CPT

## 2020-04-12 PROCEDURE — 96375 TX/PRO/DX INJ NEW DRUG ADDON: CPT

## 2020-04-12 PROCEDURE — 83690 ASSAY OF LIPASE: CPT

## 2020-04-12 PROCEDURE — 82150 ASSAY OF AMYLASE: CPT

## 2020-04-12 PROCEDURE — 6360000002 HC RX W HCPCS: Performed by: EMERGENCY MEDICINE

## 2020-04-12 PROCEDURE — 2580000003 HC RX 258: Performed by: EMERGENCY MEDICINE

## 2020-04-12 PROCEDURE — 74022 RADEX COMPL AQT ABD SERIES: CPT

## 2020-04-12 PROCEDURE — 99284 EMERGENCY DEPT VISIT MOD MDM: CPT

## 2020-04-12 RX ORDER — PREDNISONE 10 MG/1
TABLET ORAL
Qty: 20 TABLET | Refills: 0 | Status: SHIPPED | OUTPATIENT
Start: 2020-04-12 | End: 2020-04-22

## 2020-04-12 RX ORDER — ONDANSETRON 2 MG/ML
4 INJECTION INTRAMUSCULAR; INTRAVENOUS ONCE
Status: COMPLETED | OUTPATIENT
Start: 2020-04-12 | End: 2020-04-12

## 2020-04-12 RX ORDER — SODIUM CHLORIDE, SODIUM LACTATE, POTASSIUM CHLORIDE, AND CALCIUM CHLORIDE .6; .31; .03; .02 G/100ML; G/100ML; G/100ML; G/100ML
1000 INJECTION, SOLUTION INTRAVENOUS ONCE
Status: COMPLETED | OUTPATIENT
Start: 2020-04-12 | End: 2020-04-12

## 2020-04-12 RX ORDER — OXYCODONE HYDROCHLORIDE AND ACETAMINOPHEN 5; 325 MG/1; MG/1
1 TABLET ORAL EVERY 6 HOURS PRN
Qty: 20 TABLET | Refills: 0 | Status: SHIPPED | OUTPATIENT
Start: 2020-04-12 | End: 2020-04-17

## 2020-04-12 RX ORDER — MORPHINE SULFATE 4 MG/ML
4 INJECTION, SOLUTION INTRAMUSCULAR; INTRAVENOUS ONCE
Status: COMPLETED | OUTPATIENT
Start: 2020-04-12 | End: 2020-04-12

## 2020-04-12 RX ORDER — KETOROLAC TROMETHAMINE 30 MG/ML
15 INJECTION, SOLUTION INTRAMUSCULAR; INTRAVENOUS ONCE
Status: COMPLETED | OUTPATIENT
Start: 2020-04-12 | End: 2020-04-12

## 2020-04-12 RX ORDER — 0.9 % SODIUM CHLORIDE 0.9 %
1000 INTRAVENOUS SOLUTION INTRAVENOUS ONCE
Status: COMPLETED | OUTPATIENT
Start: 2020-04-12 | End: 2020-04-12

## 2020-04-12 RX ADMIN — SODIUM CHLORIDE 1000 ML: 9 INJECTION, SOLUTION INTRAVENOUS at 10:25

## 2020-04-12 RX ADMIN — ONDANSETRON 4 MG: 2 INJECTION INTRAMUSCULAR; INTRAVENOUS at 10:26

## 2020-04-12 RX ADMIN — SODIUM CHLORIDE, POTASSIUM CHLORIDE, SODIUM LACTATE AND CALCIUM CHLORIDE 1000 ML: 600; 310; 30; 20 INJECTION, SOLUTION INTRAVENOUS at 11:37

## 2020-04-12 RX ADMIN — MORPHINE SULFATE 4 MG: 4 INJECTION, SOLUTION INTRAMUSCULAR; INTRAVENOUS at 10:29

## 2020-04-12 RX ADMIN — KETOROLAC TROMETHAMINE 15 MG: 30 INJECTION, SOLUTION INTRAMUSCULAR at 11:03

## 2020-04-12 ASSESSMENT — PAIN DESCRIPTION - DIRECTION: RADIATING_TOWARDS: ACROSS LOWER BACK

## 2020-04-12 ASSESSMENT — PAIN DESCRIPTION - PROGRESSION
CLINICAL_PROGRESSION: GRADUALLY IMPROVING
CLINICAL_PROGRESSION: GRADUALLY WORSENING

## 2020-04-12 ASSESSMENT — PAIN DESCRIPTION - ONSET
ONSET: ON-GOING
ONSET: ON-GOING

## 2020-04-12 ASSESSMENT — PAIN DESCRIPTION - LOCATION
LOCATION: ABDOMEN
LOCATION: ABDOMEN

## 2020-04-12 ASSESSMENT — PAIN DESCRIPTION - PAIN TYPE
TYPE: ACUTE PAIN
TYPE: ACUTE PAIN

## 2020-04-12 ASSESSMENT — PAIN DESCRIPTION - FREQUENCY
FREQUENCY: CONTINUOUS
FREQUENCY: CONTINUOUS

## 2020-04-12 ASSESSMENT — PAIN SCALES - GENERAL
PAINLEVEL_OUTOF10: 6
PAINLEVEL_OUTOF10: 8
PAINLEVEL_OUTOF10: 9

## 2020-04-12 ASSESSMENT — PAIN DESCRIPTION - DESCRIPTORS: DESCRIPTORS: SHARP;ACHING

## 2020-04-12 ASSESSMENT — PAIN DESCRIPTION - ORIENTATION: ORIENTATION: LEFT;LOWER

## 2020-04-12 NOTE — ED PROVIDER NOTES
[facility]  eMERGENCY dEPARTMENT eNCOUnter      Pt Name: Souleymane Beth  MRN: [id number]  [place] 1990  Date of evaluation: 4/12/2020      CHIEF COMPLAINT       Chief Complaint   Patient presents with    Abdominal Pain     pt reports hx crohn's     Diarrhea     started 2 days ago         HISTORY OF PRESENT ILLNESS      The patient presents with abdominal pain and diarrhea that started 2 days ago. He has a history of Crohn's disease. He is complaining of pain in the left lower quadrant. He has not been passing blood. He has had some mild nausea. He had surgery in the remote past.  The patient was seen in March for a Crohn's exacerbation. He was placed on steroids with improvement. He does not have a doctor for this currently. He is working on getting one. He has not had a fever. Nothing makes the symptoms better or worse otherwise. REVIEW OF SYSTEMS       All systems reviewed and negative unless noted in HPI. The patient denies fever or constitutional symptoms. Denies vision change. Denies any sore throat or rhinorrhea. Denies any neck pain or stiffness. Denies chest pain or shortness of breath. Nausea and LLQ abdominal pain with diarrhea. Denies any dysuria. Denies urinary frequency or hematuria. Denies musculoskeletal injury or pain. Denies any weakness, numbness or focal neurologic deficit. Denies any skin rash or edema. No recent psychiatric issues. No easy bruising or bleeding. Denies any polyuria, polydypsia or history of immunocompromise. PAST MEDICAL HISTORY    has a past medical history of Crohn's colitis ([address] [address] SURGICAL HISTORY      has a past surgical history that includes Colon surgery.     CURRENT MEDICATIONS       Previous Medications    FAMOTIDINE (PEPCID) 20 MG TABLET    Take 1 tablet by mouth 2 times daily for 14 days    ONDANSETRON (ZOFRAN ODT) 4 MG DISINTEGRATING TABLET    Take 1 tablet by mouth every 8 hours as needed process. Nonobstructive bowel gas pattern. Narrative:    EXAMINATION:  TWO XRAY VIEWS OF THE ABDOMEN AND SINGLE  XRAY VIEW OF THE CHEST    4/12/2020 10:42 am    COMPARISON:  Abdominal series performed 02/21/2020. HISTORY:  ORDERING SYSTEM PROVIDED HISTORY: Crohn's' dz  TECHNOLOGIST PROVIDED HISTORY:  Crohn's' dz  Reason for Exam: abdominal pain and diarrhea that started 2 days ago. [facility] has  a history of Crohn's disease. [facility] is complaining of pain in the left lower  quadrant. FINDINGS:  The lungs are without consolidation or effusion. [place] Kushal is no pneumothorax. The mediastinal structures are unremarkable.  The extrathoracic soft tissues  are unremarkable.     There is a nonobstructive bowel gas pattern. [place] Kushal is no intraperitoneal  free air.  There are no suspicious calcifications. Raymundo Kushal is no acute osseous  abnormality.  The extra-abdominal soft tissues are unremarkable.                      LABS:  Results for orders placed or performed during the hospital encounter of 04/12/20   CBC Auto Differential   Result Value Ref Range    WBC 5.7 3.5 - 11.3 k/uL    RBC 5.07 4.21 - 5.77 m/uL    Hemoglobin 14.3 13.0 - 17.0 g/dL    Hematocrit 42.7 40.7 - 50.3 %    MCV 84.2 82.6 - 102.9 fL    MCH 28.2 25.2 - 33.5 pg    MCHC 33.5 25.2 - 33.5 g/dL    RDW 13.0 11.8 - 14.4 %    Platelets 337 811 - 324 k/uL    MPV 10.8 8.1 - 13.5 fL    NRBC Automated 0.0 0.0 per 100 WBC    Differential Type NOT REPORTED     WBC Morphology NOT REPORTED     RBC Morphology NOT REPORTED     Platelet Estimate NOT REPORTED     Seg Neutrophils 68 (H) 36 - 65 %    Lymphocytes 20 (L) 24 - 43 %    Monocytes 9 3 - 12 %    Eosinophils % 3 1 - 4 %    Basophils 1 0 - 2 %    Immature Granulocytes 0 0 %    Segs Absolute 3.87 1.50 - 8.10 k/uL    Absolute Lymph # 1.12 1.10 - 3.70 k/uL    Absolute Mono # 0.49 0.10 - 1.20 k/uL    Absolute Eos # 0.19 0.00 - 0.44 k/uL    Basophils Absolute 0.03 0.00 - 0.20 k/uL    Absolute Immature Granulocyte <0.03

## 2020-04-13 ENCOUNTER — CARE COORDINATION (OUTPATIENT)
Dept: CARE COORDINATION | Age: 30
End: 2020-04-13

## 2020-04-13 NOTE — CARE COORDINATION
Simeon Fermin was seen at [facility] ER 4/12/2020- Abd pain, diarrhea. Hx of Crohn's Disease. He was given Prednisone and Percocet. He was advised to establish with PCP- Dr. Mimi Hensley and Surgeon Dr. Amadou Lipscomb.     4/13/2020- 12:50 pm Left message requesting return call re: Covid at Risk education. 4/13/2020- 5:27 pm left second message. 4/14/2020- 8:18 am Left third message requesting return call.

## 2020-05-10 ENCOUNTER — APPOINTMENT (OUTPATIENT)
Dept: GENERAL RADIOLOGY | Age: 30
End: 2020-05-10
Payer: MEDICAID

## 2020-05-10 ENCOUNTER — HOSPITAL ENCOUNTER (EMERGENCY)
Age: 30
Discharge: HOME | End: 2020-05-10
Attending: EMERGENCY MEDICINE
Payer: MEDICAID

## 2020-05-10 VITALS
SYSTOLIC BLOOD PRESSURE: 120 MMHG | DIASTOLIC BLOOD PRESSURE: 65 MMHG | OXYGEN SATURATION: 99 % | HEART RATE: 63 BPM | TEMPERATURE: 97 F | HEIGHT: 76 IN | RESPIRATION RATE: 16 BRPM | BODY MASS INDEX: 27.4 KG/M2 | WEIGHT: 225 LBS

## 2020-05-10 LAB
ABSOLUTE EOS #: 0.47 K/UL (ref 0–0.44)
ABSOLUTE IMMATURE GRANULOCYTE: <0.03 K/UL (ref 0–0.3)
ABSOLUTE LYMPH #: 1.84 K/UL (ref 1.1–3.7)
ABSOLUTE MONO #: 0.62 K/UL (ref 0.1–1.2)
ALBUMIN SERPL-MCNC: 4.4 G/DL (ref 3.5–5.2)
ALBUMIN/GLOBULIN RATIO: 1.8 (ref 1–2.5)
ALP BLD-CCNC: 68 U/L (ref 40–129)
ALT SERPL-CCNC: 16 U/L (ref 5–41)
AMYLASE: 49 U/L (ref 28–100)
ANION GAP SERPL CALCULATED.3IONS-SCNC: 12 MMOL/L (ref 9–17)
AST SERPL-CCNC: 14 U/L
BASOPHILS # BLD: 1 % (ref 0–2)
BASOPHILS ABSOLUTE: 0.03 K/UL (ref 0–0.2)
BILIRUB SERPL-MCNC: 0.56 MG/DL (ref 0.3–1.2)
BILIRUBIN DIRECT: 0.15 MG/DL
BILIRUBIN, INDIRECT: 0.41 MG/DL (ref 0–1)
BUN BLDV-MCNC: 6 MG/DL (ref 6–20)
BUN/CREAT BLD: 7 (ref 9–20)
CALCIUM SERPL-MCNC: 9.4 MG/DL (ref 8.6–10.4)
CHLORIDE BLD-SCNC: 99 MMOL/L (ref 98–107)
CO2: 28 MMOL/L (ref 20–31)
CREAT SERPL-MCNC: 0.85 MG/DL (ref 0.7–1.2)
DIFFERENTIAL TYPE: ABNORMAL
EOSINOPHILS RELATIVE PERCENT: 7 % (ref 1–4)
GFR AFRICAN AMERICAN: >60 ML/MIN
GFR NON-AFRICAN AMERICAN: >60 ML/MIN
GFR SERPL CREATININE-BSD FRML MDRD: ABNORMAL ML/MIN/{1.73_M2}
GFR SERPL CREATININE-BSD FRML MDRD: ABNORMAL ML/MIN/{1.73_M2}
GLOBULIN: 2.4 G/DL (ref 1.5–3.8)
GLUCOSE BLD-MCNC: 93 MG/DL (ref 70–99)
HCT VFR BLD CALC: 39.6 % (ref 40.7–50.3)
HEMOGLOBIN: 13.5 G/DL (ref 13–17)
IMMATURE GRANULOCYTES: 0 %
LACTIC ACID: 1.7 MMOL/L (ref 0.5–2.2)
LIPASE: 19 U/L (ref 13–60)
LYMPHOCYTES # BLD: 28 % (ref 24–43)
MCH RBC QN AUTO: 29 PG (ref 25.2–33.5)
MCHC RBC AUTO-ENTMCNC: 34.1 G/DL (ref 25.2–33.5)
MCV RBC AUTO: 85 FL (ref 82.6–102.9)
MONOCYTES # BLD: 10 % (ref 3–12)
NRBC AUTOMATED: 0 PER 100 WBC
PDW BLD-RTO: 12.9 % (ref 11.8–14.4)
PLATELET # BLD: 232 K/UL (ref 138–453)
PLATELET ESTIMATE: ABNORMAL
PMV BLD AUTO: 10.9 FL (ref 8.1–13.5)
POTASSIUM SERPL-SCNC: 3.8 MMOL/L (ref 3.7–5.3)
RBC # BLD: 4.66 M/UL (ref 4.21–5.77)
RBC # BLD: ABNORMAL 10*6/UL
SEG NEUTROPHILS: 54 % (ref 36–65)
SEGMENTED NEUTROPHILS ABSOLUTE COUNT: 3.51 K/UL (ref 1.5–8.1)
SODIUM BLD-SCNC: 139 MMOL/L (ref 135–144)
TOTAL PROTEIN: 6.8 G/DL (ref 6.4–8.3)
WBC # BLD: 6.5 K/UL (ref 3.5–11.3)
WBC # BLD: ABNORMAL 10*3/UL

## 2020-05-10 PROCEDURE — 96374 THER/PROPH/DIAG INJ IV PUSH: CPT

## 2020-05-10 PROCEDURE — 83690 ASSAY OF LIPASE: CPT

## 2020-05-10 PROCEDURE — 85025 COMPLETE CBC W/AUTO DIFF WBC: CPT

## 2020-05-10 PROCEDURE — 99284 EMERGENCY DEPT VISIT MOD MDM: CPT

## 2020-05-10 PROCEDURE — 80048 BASIC METABOLIC PNL TOTAL CA: CPT

## 2020-05-10 PROCEDURE — 96361 HYDRATE IV INFUSION ADD-ON: CPT

## 2020-05-10 PROCEDURE — 83605 ASSAY OF LACTIC ACID: CPT

## 2020-05-10 PROCEDURE — 2580000003 HC RX 258: Performed by: EMERGENCY MEDICINE

## 2020-05-10 PROCEDURE — 6370000000 HC RX 637 (ALT 250 FOR IP): Performed by: EMERGENCY MEDICINE

## 2020-05-10 PROCEDURE — 80076 HEPATIC FUNCTION PANEL: CPT

## 2020-05-10 PROCEDURE — 82150 ASSAY OF AMYLASE: CPT

## 2020-05-10 PROCEDURE — 96375 TX/PRO/DX INJ NEW DRUG ADDON: CPT

## 2020-05-10 PROCEDURE — 6360000002 HC RX W HCPCS: Performed by: EMERGENCY MEDICINE

## 2020-05-10 PROCEDURE — 74022 RADEX COMPL AQT ABD SERIES: CPT

## 2020-05-10 RX ORDER — ONDANSETRON 4 MG/1
4 TABLET, FILM COATED ORAL EVERY 8 HOURS PRN
Qty: 20 TABLET | Refills: 0 | Status: SHIPPED | OUTPATIENT
Start: 2020-05-10 | End: 2020-12-15

## 2020-05-10 RX ORDER — ONDANSETRON 2 MG/ML
4 INJECTION INTRAMUSCULAR; INTRAVENOUS ONCE
Status: COMPLETED | OUTPATIENT
Start: 2020-05-10 | End: 2020-05-10

## 2020-05-10 RX ORDER — PREDNISONE 20 MG/1
60 TABLET ORAL ONCE
Status: COMPLETED | OUTPATIENT
Start: 2020-05-10 | End: 2020-05-10

## 2020-05-10 RX ORDER — 0.9 % SODIUM CHLORIDE 0.9 %
1000 INTRAVENOUS SOLUTION INTRAVENOUS ONCE
Status: COMPLETED | OUTPATIENT
Start: 2020-05-10 | End: 2020-05-10

## 2020-05-10 RX ORDER — MORPHINE SULFATE 4 MG/ML
4 INJECTION, SOLUTION INTRAMUSCULAR; INTRAVENOUS ONCE
Status: COMPLETED | OUTPATIENT
Start: 2020-05-10 | End: 2020-05-10

## 2020-05-10 RX ORDER — KETOROLAC TROMETHAMINE 30 MG/ML
15 INJECTION, SOLUTION INTRAMUSCULAR; INTRAVENOUS ONCE
Status: COMPLETED | OUTPATIENT
Start: 2020-05-10 | End: 2020-05-10

## 2020-05-10 RX ORDER — PREDNISONE 50 MG/1
50 TABLET ORAL DAILY
Qty: 4 TABLET | Refills: 0 | Status: SHIPPED | OUTPATIENT
Start: 2020-05-10 | End: 2020-05-14

## 2020-05-10 RX ADMIN — SODIUM CHLORIDE 1000 ML: 9 INJECTION, SOLUTION INTRAVENOUS at 04:26

## 2020-05-10 RX ADMIN — ONDANSETRON 4 MG: 2 INJECTION INTRAMUSCULAR; INTRAVENOUS at 04:26

## 2020-05-10 RX ADMIN — KETOROLAC TROMETHAMINE 15 MG: 30 INJECTION, SOLUTION INTRAMUSCULAR at 04:27

## 2020-05-10 RX ADMIN — PREDNISONE 60 MG: 20 TABLET ORAL at 05:24

## 2020-05-10 RX ADMIN — MORPHINE SULFATE 4 MG: 4 INJECTION, SOLUTION INTRAMUSCULAR; INTRAVENOUS at 04:58

## 2020-05-10 ASSESSMENT — PAIN SCALES - GENERAL
PAINLEVEL_OUTOF10: 9
PAINLEVEL_OUTOF10: 7

## 2020-05-10 ASSESSMENT — PAIN DESCRIPTION - LOCATION: LOCATION: ABDOMEN

## 2020-05-10 ASSESSMENT — PAIN DESCRIPTION - ORIENTATION: ORIENTATION: MID

## 2020-05-10 NOTE — ED TRIAGE NOTES
Amb to ED 3 with steady gait per self. Reports mid abd pain for 3 days radiating to back, hx of Crohns and think this may be a flare up.

## 2020-05-10 NOTE — ED PROVIDER NOTES
history is not on file. SOCIAL HISTORY      reports that he has been smoking cigarettes. He has been smoking about 0.50 packs per day. He has never used smokeless tobacco. He reports current alcohol use. He reports previous drug use. PHYSICAL EXAM       ED Triage Vitals [05/10/20 0405]   BP Temp Temp Source Pulse Resp SpO2 Height Weight   (!) 157/90 97 °F (36.1 °C) Tympanic 61 18 100 % 6' 4\" (1.93 m) 225 lb (102.1 kg)       Constitutional: Alert, oriented x3, nontoxic, answering questions appropriately, acting properly for age, in no acute distress   HEENT: Extraocular muscles intact, mucus membranes dry  Neck: Trachea midline   Cardiovascular: Regular rhythm and rate no S3, S4, or murmurs   Respiratory: Clear to auscultation bilaterally no wheezes, rhonchi, rales, no respiratory distress no tachypnea no retractions no hypoxia  Gastrointestinal: Soft, mild diffuse tenderness, nondistended, diminished bowel sounds. No rebound, rigidity, or guarding. Musculoskeletal: No extremity pain or swelling   Neurologic: Moving all 4 extremities without difficulty there are no gross focal neurologic deficits   Skin: Warm and dry       DIFFERENTIAL DIAGNOSIS/ MDM:     IV fluids and labs. Pain control. Zofran.     DIAGNOSTIC RESULTS     EKG: All EKG's are interpreted by theEmergency Department Physician who either signs or Co-signs this chart in the absence of a cardiologist.        Not indicated unless otherwise documented above    LABS:  Results for orders placed or performed during the hospital encounter of 05/10/20   CBC Auto Differential   Result Value Ref Range    WBC 6.5 3.5 - 11.3 k/uL    RBC 4.66 4.21 - 5.77 m/uL    Hemoglobin 13.5 13.0 - 17.0 g/dL    Hematocrit 39.6 (L) 40.7 - 50.3 %    MCV 85.0 82.6 - 102.9 fL    MCH 29.0 25.2 - 33.5 pg    MCHC 34.1 (H) 25.2 - 33.5 g/dL    RDW 12.9 11.8 - 14.4 %    Platelets 987 205 - 760 k/uL    MPV 10.9 8.1 - 13.5 fL    NRBC Automated 0.0 0.0 per 100 WBC    Differential Type

## 2020-05-20 ENCOUNTER — HOSPITAL ENCOUNTER (EMERGENCY)
Age: 30
Discharge: HOME | End: 2020-05-21
Attending: EMERGENCY MEDICINE
Payer: MEDICAID

## 2020-05-20 LAB
ABSOLUTE EOS #: 0.17 K/UL (ref 0–0.44)
ABSOLUTE IMMATURE GRANULOCYTE: <0.03 K/UL (ref 0–0.3)
ABSOLUTE LYMPH #: 1.55 K/UL (ref 1.1–3.7)
ABSOLUTE MONO #: 0.64 K/UL (ref 0.1–1.2)
BASOPHILS # BLD: 0 % (ref 0–2)
BASOPHILS ABSOLUTE: <0.03 K/UL (ref 0–0.2)
DIFFERENTIAL TYPE: ABNORMAL
EOSINOPHILS RELATIVE PERCENT: 3 % (ref 1–4)
HCT VFR BLD CALC: 42.4 % (ref 40.7–50.3)
HEMOGLOBIN: 14.3 G/DL (ref 13–17)
IMMATURE GRANULOCYTES: 0 %
LYMPHOCYTES # BLD: 27 % (ref 24–43)
MCH RBC QN AUTO: 28.3 PG (ref 25.2–33.5)
MCHC RBC AUTO-ENTMCNC: 33.7 G/DL (ref 25.2–33.5)
MCV RBC AUTO: 83.8 FL (ref 82.6–102.9)
MONOCYTES # BLD: 11 % (ref 3–12)
NRBC AUTOMATED: 0 PER 100 WBC
PDW BLD-RTO: 13 % (ref 11.8–14.4)
PLATELET # BLD: 280 K/UL (ref 138–453)
PLATELET ESTIMATE: ABNORMAL
PMV BLD AUTO: 10.7 FL (ref 8.1–13.5)
RBC # BLD: 5.06 M/UL (ref 4.21–5.77)
RBC # BLD: ABNORMAL 10*6/UL
SEG NEUTROPHILS: 59 % (ref 36–65)
SEGMENTED NEUTROPHILS ABSOLUTE COUNT: 3.3 K/UL (ref 1.5–8.1)
WBC # BLD: 5.7 K/UL (ref 3.5–11.3)
WBC # BLD: ABNORMAL 10*3/UL

## 2020-05-20 PROCEDURE — 6370000000 HC RX 637 (ALT 250 FOR IP): Performed by: EMERGENCY MEDICINE

## 2020-05-20 PROCEDURE — 87651 STREP A DNA AMP PROBE: CPT

## 2020-05-20 PROCEDURE — 85025 COMPLETE CBC W/AUTO DIFF WBC: CPT

## 2020-05-20 PROCEDURE — 83690 ASSAY OF LIPASE: CPT

## 2020-05-20 PROCEDURE — 99284 EMERGENCY DEPT VISIT MOD MDM: CPT

## 2020-05-20 PROCEDURE — 36415 COLL VENOUS BLD VENIPUNCTURE: CPT

## 2020-05-20 PROCEDURE — 80053 COMPREHEN METABOLIC PANEL: CPT

## 2020-05-20 RX ORDER — ACETAMINOPHEN 500 MG
1000 TABLET ORAL ONCE
Status: COMPLETED | OUTPATIENT
Start: 2020-05-21 | End: 2020-05-20

## 2020-05-20 RX ADMIN — ACETAMINOPHEN 1000 MG: 500 TABLET, FILM COATED ORAL at 23:56

## 2020-05-20 ASSESSMENT — PAIN DESCRIPTION - FREQUENCY: FREQUENCY: INTERMITTENT

## 2020-05-20 ASSESSMENT — PAIN DESCRIPTION - DESCRIPTORS: DESCRIPTORS: SHARP;ACHING

## 2020-05-20 ASSESSMENT — PAIN SCALES - GENERAL
PAINLEVEL_OUTOF10: 10
PAINLEVEL_OUTOF10: 10

## 2020-05-20 ASSESSMENT — PAIN DESCRIPTION - PAIN TYPE: TYPE: ACUTE PAIN

## 2020-05-20 ASSESSMENT — PAIN DESCRIPTION - LOCATION: LOCATION: ABDOMEN

## 2020-05-20 ASSESSMENT — PAIN DESCRIPTION - ORIENTATION: ORIENTATION: LOWER

## 2020-05-21 ENCOUNTER — APPOINTMENT (OUTPATIENT)
Dept: GENERAL RADIOLOGY | Age: 30
End: 2020-05-21
Payer: MEDICAID

## 2020-05-21 VITALS
BODY MASS INDEX: 26.79 KG/M2 | RESPIRATION RATE: 16 BRPM | TEMPERATURE: 97 F | DIASTOLIC BLOOD PRESSURE: 85 MMHG | WEIGHT: 220 LBS | HEIGHT: 76 IN | OXYGEN SATURATION: 98 % | HEART RATE: 95 BPM | SYSTOLIC BLOOD PRESSURE: 111 MMHG

## 2020-05-21 LAB
-: ABNORMAL
ALBUMIN SERPL-MCNC: 4.6 G/DL (ref 3.5–5.2)
ALBUMIN/GLOBULIN RATIO: 1.4 (ref 1–2.5)
ALP BLD-CCNC: 69 U/L (ref 40–129)
ALT SERPL-CCNC: 25 U/L (ref 5–41)
AMORPHOUS: ABNORMAL
ANION GAP SERPL CALCULATED.3IONS-SCNC: 13 MMOL/L (ref 9–17)
AST SERPL-CCNC: 18 U/L
BACTERIA: ABNORMAL
BILIRUB SERPL-MCNC: 0.66 MG/DL (ref 0.3–1.2)
BILIRUBIN URINE: NEGATIVE
BUN BLDV-MCNC: 9 MG/DL (ref 6–20)
BUN/CREAT BLD: 10 (ref 9–20)
CALCIUM SERPL-MCNC: 9.8 MG/DL (ref 8.6–10.4)
CASTS UA: ABNORMAL /LPF (ref 0–2)
CHLORIDE BLD-SCNC: 97 MMOL/L (ref 98–107)
CO2: 29 MMOL/L (ref 20–31)
COLOR: ABNORMAL
COMMENT UA: ABNORMAL
CREAT SERPL-MCNC: 0.91 MG/DL (ref 0.7–1.2)
CRYSTALS, UA: ABNORMAL /HPF
EPITHELIAL CELLS UA: ABNORMAL /HPF (ref 0–5)
GFR AFRICAN AMERICAN: >60 ML/MIN
GFR NON-AFRICAN AMERICAN: >60 ML/MIN
GFR SERPL CREATININE-BSD FRML MDRD: ABNORMAL ML/MIN/{1.73_M2}
GFR SERPL CREATININE-BSD FRML MDRD: ABNORMAL ML/MIN/{1.73_M2}
GLUCOSE BLD-MCNC: 96 MG/DL (ref 70–99)
GLUCOSE URINE: NEGATIVE
KETONES, URINE: NEGATIVE
LEUKOCYTE ESTERASE, URINE: NEGATIVE
LIPASE: 18 U/L (ref 13–60)
MUCUS: ABNORMAL
NITRITE, URINE: NEGATIVE
OTHER OBSERVATIONS UA: ABNORMAL
PH UA: 5.5 (ref 5–6)
POC STREP A ANTIGEN: NEGATIVE
POTASSIUM SERPL-SCNC: 3.7 MMOL/L (ref 3.7–5.3)
PROTEIN UA: NEGATIVE
RBC UA: ABNORMAL /HPF (ref 0–4)
RENAL EPITHELIAL, UA: ABNORMAL /HPF
SODIUM BLD-SCNC: 139 MMOL/L (ref 135–144)
SPECIFIC GRAVITY UA: 1.03 (ref 1.01–1.02)
TOTAL PROTEIN: 7.9 G/DL (ref 6.4–8.3)
TRICHOMONAS: ABNORMAL
TURBIDITY: ABNORMAL
URINE HGB: NEGATIVE
UROBILINOGEN, URINE: NORMAL
WBC UA: ABNORMAL /HPF (ref 0–4)
YEAST: ABNORMAL

## 2020-05-21 PROCEDURE — 87651 STREP A DNA AMP PROBE: CPT

## 2020-05-21 PROCEDURE — 81001 URINALYSIS AUTO W/SCOPE: CPT

## 2020-05-21 PROCEDURE — 74022 RADEX COMPL AQT ABD SERIES: CPT

## 2020-05-21 RX ORDER — PROMETHAZINE HYDROCHLORIDE 6.25 MG/5ML
25 SYRUP ORAL EVERY 6 HOURS PRN
Qty: 240 ML | Refills: 0 | Status: SHIPPED | OUTPATIENT
Start: 2020-05-21 | End: 2020-05-28

## 2020-05-21 ASSESSMENT — ENCOUNTER SYMPTOMS
VOMITING: 1
COUGH: 1
NAUSEA: 1
ABDOMINAL PAIN: 1
SORE THROAT: 1
SHORTNESS OF BREATH: 0

## 2020-05-21 ASSESSMENT — PAIN DESCRIPTION - FREQUENCY: FREQUENCY: INTERMITTENT

## 2020-05-21 ASSESSMENT — PAIN SCALES - GENERAL: PAINLEVEL_OUTOF10: 5

## 2020-05-21 ASSESSMENT — PAIN DESCRIPTION - DESCRIPTORS: DESCRIPTORS: SHARP

## 2020-05-21 ASSESSMENT — PAIN DESCRIPTION - LOCATION: LOCATION: ABDOMEN

## 2020-05-21 ASSESSMENT — PAIN DESCRIPTION - PAIN TYPE: TYPE: ACUTE PAIN

## 2020-05-21 ASSESSMENT — PAIN DESCRIPTION - ORIENTATION: ORIENTATION: RIGHT

## 2020-05-21 NOTE — ED TRIAGE NOTES
Patient presents to the ED with complaints of abdominal pain. Patient states he threw up two days ago and states his stomach has been hurting sense. History of chron's disease. Patient has not seen or followed up with family doctor. Patient states he came in to be cleared for work before they will allow him to go back. No other questions or concerns at this time. Call light within reach.

## 2020-05-21 NOTE — ED PROVIDER NOTES
reviewed the disposition diagnosis with the patient and or their family/guardian. I have answered their questions and givendischarge instructions. They voiced understanding of these instructions and did not have any further questions or complaints. DIAGNOSTIC RESULTS     EKG: All EKG's are interpreted by the Emergency Department Physician who either signs or Co-signs this chart inthe absence of a cardiologist.        RADIOLOGY:   I directly visualized the following plain film images and reviewed the radiologistinterpretations of radiologic studies:  Xr Acute Abd Series Chest 1 Vw    Result Date: 5/21/2020  EXAMINATION: TWO XRAY VIEWS OF THE ABDOMEN AND SINGLE  XRAY VIEW OF THE CHEST 5/21/2020 12:25 am COMPARISON: 05/10/2020 HISTORY: ORDERING SYSTEM PROVIDED HISTORY: cough / abd pain / vomiting TECHNOLOGIST PROVIDED HISTORY: cough / abd pain / vomiting Reason for Exam: Cough left side abd pain and vomiting off and on for 1 week, hx of crohns Acuity: Acute Type of Exam: Initial FINDINGS: Lungs are clear without acute process. No pleural effusion or pneumothorax. No focal consolidation or edema. Cardiomediastinal silhouette and bony thorax are without acute abnormality. No evidence of intraperitoneal free air. Nonspecific bowel gas pattern without evidence of obstruction. No abnormal calcifications. Osseous structures are intact. No acute process in the chest. No bowel obstruction or free air. Xr Acute Abd Series Chest 1 Vw    Result Date: 5/10/2020  EXAMINATION: TWO XRAY VIEWS OF THE ABDOMEN AND SINGLE  XRAY VIEW OF THE CHEST 5/10/2020 5:25 am COMPARISON: 04/12/2020 HISTORY: ORDERING SYSTEM PROVIDED HISTORY: abdominal pain TECHNOLOGIST PROVIDED HISTORY: abdominal pain Reason for Exam: whole abd pain for 3 days, hx crohns Acuity: Acute Type of Exam: Initial FINDINGS: Lungs are clear without acute process. No pleural effusion or pneumothorax. No focal consolidation or edema.  Cardiomediastinal silhouette and MEDICATIONS:  Discharge Medication List as of 5/21/2020  1:24 AM      START taking these medications    Details   promethazine (PHENERGAN) 6.25 MG/5ML syrup Take 20 mLs by mouth every 6 hours as needed for Nausea, Disp-240 mL, R-0Print             (Please note that portions of this note were completed with avoice recognition program.  Efforts were made to edit the dictations but occasionally words are mis-transcribed.)    Kirk Schwarz MD, [address] [facility], [place],[address]  Attending Emergency Medicine Physician        Kirk Schwarz MD  05/21/20 0130       Kirk Schwarz MD  05/21/20 0885

## 2020-05-22 ENCOUNTER — CARE COORDINATION (OUTPATIENT)
Dept: CARE COORDINATION | Age: 30
End: 2020-05-22

## 2020-05-22 LAB
DIRECT EXAM: NORMAL
Lab: NORMAL
SPECIMEN DESCRIPTION: NORMAL

## 2020-05-22 NOTE — CARE COORDINATION
Attempt to contact patient today regarding ED follow up, COVID -19 Monitoring. Unable to reach patient. Left voicemail message      Recent encounters and notes reviewed. No future appointments.       Joao Cotto RN Care Manager  [phone]

## 2020-05-26 ENCOUNTER — TELEPHONE (OUTPATIENT)
Dept: FAMILY MEDICINE CLINIC | Age: 30
End: 2020-05-26

## 2020-05-26 NOTE — TELEPHONE ENCOUNTER
----- Message from Wendi Feliciano RN sent at 5/22/2020  5:06 PM EDT -----  Regarding: ENROLL IN GET WELL LOOP AND NEEDS PCP  Patient/family/caregiver given information for GetWell Loop and agrees to enroll yes  Patient referred for LOOP Program: Active Symptoms  Patient's preferred e-mail:  [email or web address]  Patient's preferred phone number:  [phone]   Care Plan:  Active Symptoms  LOOP Provider:  none  (GREEN ONLY) Recommended Follow Up:3-5 days, no pcp

## 2020-07-27 ENCOUNTER — APPOINTMENT (OUTPATIENT)
Dept: GENERAL RADIOLOGY | Age: 30
End: 2020-07-27
Payer: MEDICAID

## 2020-07-27 ENCOUNTER — HOSPITAL ENCOUNTER (EMERGENCY)
Age: 30
Discharge: HOME | End: 2020-07-27
Attending: EMERGENCY MEDICINE
Payer: MEDICAID

## 2020-07-27 VITALS
SYSTOLIC BLOOD PRESSURE: 140 MMHG | HEART RATE: 59 BPM | DIASTOLIC BLOOD PRESSURE: 85 MMHG | TEMPERATURE: 98.2 F | OXYGEN SATURATION: 100 % | RESPIRATION RATE: 12 BRPM | WEIGHT: 225 LBS | BODY MASS INDEX: 27.39 KG/M2

## 2020-07-27 LAB
ABSOLUTE EOS #: 0.13 K/UL (ref 0–0.44)
ABSOLUTE IMMATURE GRANULOCYTE: <0.03 K/UL (ref 0–0.3)
ABSOLUTE LYMPH #: 1.33 K/UL (ref 1.1–3.7)
ABSOLUTE MONO #: 0.41 K/UL (ref 0.1–1.2)
ALBUMIN SERPL-MCNC: 4.6 G/DL (ref 3.5–5.2)
ALBUMIN/GLOBULIN RATIO: 1.7 (ref 1–2.5)
ALP BLD-CCNC: 77 U/L (ref 40–129)
ALT SERPL-CCNC: 39 U/L (ref 5–41)
ANION GAP SERPL CALCULATED.3IONS-SCNC: 14 MMOL/L (ref 9–17)
AST SERPL-CCNC: 14 U/L
BASOPHILS # BLD: 0 % (ref 0–2)
BASOPHILS ABSOLUTE: <0.03 K/UL (ref 0–0.2)
BILIRUB SERPL-MCNC: 1.09 MG/DL (ref 0.3–1.2)
BILIRUBIN DIRECT: 0.21 MG/DL
BILIRUBIN, INDIRECT: 0.88 MG/DL (ref 0–1)
BUN BLDV-MCNC: 5 MG/DL (ref 6–20)
BUN/CREAT BLD: 5 (ref 9–20)
CALCIUM SERPL-MCNC: 9.5 MG/DL (ref 8.6–10.4)
CHLORIDE BLD-SCNC: 100 MMOL/L (ref 98–107)
CO2: 26 MMOL/L (ref 20–31)
CREAT SERPL-MCNC: 0.92 MG/DL (ref 0.7–1.2)
DIFFERENTIAL TYPE: ABNORMAL
EOSINOPHILS RELATIVE PERCENT: 3 % (ref 1–4)
GFR AFRICAN AMERICAN: >60 ML/MIN
GFR NON-AFRICAN AMERICAN: >60 ML/MIN
GFR SERPL CREATININE-BSD FRML MDRD: ABNORMAL ML/MIN/{1.73_M2}
GFR SERPL CREATININE-BSD FRML MDRD: ABNORMAL ML/MIN/{1.73_M2}
GLOBULIN: 2.7 G/DL (ref 1.5–3.8)
GLUCOSE BLD-MCNC: 82 MG/DL (ref 70–99)
HCT VFR BLD CALC: 40.3 % (ref 40.7–50.3)
HEMOGLOBIN: 14 G/DL (ref 13–17)
IMMATURE GRANULOCYTES: 0 %
LACTIC ACID: 0.9 MMOL/L (ref 0.5–2.2)
LIPASE: 21 U/L (ref 13–60)
LYMPHOCYTES # BLD: 25 % (ref 24–43)
MCH RBC QN AUTO: 28.4 PG (ref 25.2–33.5)
MCHC RBC AUTO-ENTMCNC: 34.7 G/DL (ref 25.2–33.5)
MCV RBC AUTO: 81.7 FL (ref 82.6–102.9)
MONOCYTES # BLD: 8 % (ref 3–12)
NRBC AUTOMATED: 0 PER 100 WBC
PDW BLD-RTO: 12.7 % (ref 11.8–14.4)
PLATELET # BLD: 261 K/UL (ref 138–453)
PLATELET ESTIMATE: ABNORMAL
PMV BLD AUTO: 10.7 FL (ref 8.1–13.5)
POTASSIUM SERPL-SCNC: 3.7 MMOL/L (ref 3.7–5.3)
RBC # BLD: 4.93 M/UL (ref 4.21–5.77)
RBC # BLD: ABNORMAL 10*6/UL
SEG NEUTROPHILS: 64 % (ref 36–65)
SEGMENTED NEUTROPHILS ABSOLUTE COUNT: 3.36 K/UL (ref 1.5–8.1)
SODIUM BLD-SCNC: 140 MMOL/L (ref 135–144)
TOTAL PROTEIN: 7.3 G/DL (ref 6.4–8.3)
WBC # BLD: 5.3 K/UL (ref 3.5–11.3)
WBC # BLD: ABNORMAL 10*3/UL

## 2020-07-27 PROCEDURE — 74022 RADEX COMPL AQT ABD SERIES: CPT

## 2020-07-27 PROCEDURE — 2580000003 HC RX 258: Performed by: EMERGENCY MEDICINE

## 2020-07-27 PROCEDURE — 6360000002 HC RX W HCPCS: Performed by: EMERGENCY MEDICINE

## 2020-07-27 PROCEDURE — 80048 BASIC METABOLIC PNL TOTAL CA: CPT

## 2020-07-27 PROCEDURE — 83690 ASSAY OF LIPASE: CPT

## 2020-07-27 PROCEDURE — 80076 HEPATIC FUNCTION PANEL: CPT

## 2020-07-27 PROCEDURE — 85025 COMPLETE CBC W/AUTO DIFF WBC: CPT

## 2020-07-27 PROCEDURE — 96374 THER/PROPH/DIAG INJ IV PUSH: CPT

## 2020-07-27 PROCEDURE — 99284 EMERGENCY DEPT VISIT MOD MDM: CPT

## 2020-07-27 PROCEDURE — 96375 TX/PRO/DX INJ NEW DRUG ADDON: CPT

## 2020-07-27 PROCEDURE — 83605 ASSAY OF LACTIC ACID: CPT

## 2020-07-27 RX ORDER — DICYCLOMINE HYDROCHLORIDE 10 MG/1
10 CAPSULE ORAL EVERY 6 HOURS PRN
Qty: 20 CAPSULE | Refills: 0 | Status: SHIPPED | OUTPATIENT
Start: 2020-07-27 | End: 2020-12-15

## 2020-07-27 RX ORDER — DIPHENHYDRAMINE HYDROCHLORIDE 50 MG/ML
25 INJECTION INTRAMUSCULAR; INTRAVENOUS ONCE
Status: COMPLETED | OUTPATIENT
Start: 2020-07-27 | End: 2020-07-27

## 2020-07-27 RX ORDER — PROMETHAZINE HYDROCHLORIDE 25 MG/ML
12.5 INJECTION, SOLUTION INTRAMUSCULAR; INTRAVENOUS ONCE
Status: COMPLETED | OUTPATIENT
Start: 2020-07-27 | End: 2020-07-27

## 2020-07-27 RX ORDER — MORPHINE SULFATE 4 MG/ML
4 INJECTION, SOLUTION INTRAMUSCULAR; INTRAVENOUS ONCE
Status: COMPLETED | OUTPATIENT
Start: 2020-07-27 | End: 2020-07-27

## 2020-07-27 RX ORDER — PREDNISONE 10 MG/1
TABLET ORAL
Qty: 20 TABLET | Refills: 0 | Status: SHIPPED | OUTPATIENT
Start: 2020-07-27 | End: 2020-08-06

## 2020-07-27 RX ORDER — PROMETHAZINE HYDROCHLORIDE 25 MG/1
25 SUPPOSITORY RECTAL EVERY 6 HOURS PRN
Qty: 20 SUPPOSITORY | Refills: 0 | Status: SHIPPED | OUTPATIENT
Start: 2020-07-27 | End: 2020-08-03

## 2020-07-27 RX ORDER — 0.9 % SODIUM CHLORIDE 0.9 %
1000 INTRAVENOUS SOLUTION INTRAVENOUS ONCE
Status: COMPLETED | OUTPATIENT
Start: 2020-07-27 | End: 2020-07-27

## 2020-07-27 RX ORDER — ONDANSETRON 4 MG/1
4 TABLET, FILM COATED ORAL EVERY 8 HOURS PRN
Qty: 20 TABLET | Refills: 0 | Status: SHIPPED | OUTPATIENT
Start: 2020-07-27 | End: 2021-03-09 | Stop reason: ALTCHOICE

## 2020-07-27 RX ORDER — KETOROLAC TROMETHAMINE 30 MG/ML
15 INJECTION, SOLUTION INTRAMUSCULAR; INTRAVENOUS ONCE
Status: COMPLETED | OUTPATIENT
Start: 2020-07-27 | End: 2020-07-27

## 2020-07-27 RX ADMIN — PROMETHAZINE HYDROCHLORIDE 12.5 MG: 25 INJECTION INTRAMUSCULAR; INTRAVENOUS at 17:37

## 2020-07-27 RX ADMIN — DIPHENHYDRAMINE HYDROCHLORIDE 25 MG: 50 INJECTION, SOLUTION INTRAMUSCULAR; INTRAVENOUS at 17:35

## 2020-07-27 RX ADMIN — KETOROLAC TROMETHAMINE: 30 INJECTION, SOLUTION INTRAMUSCULAR at 17:34

## 2020-07-27 RX ADMIN — MORPHINE SULFATE 4 MG: 4 INJECTION, SOLUTION INTRAMUSCULAR; INTRAVENOUS at 18:30

## 2020-07-27 RX ADMIN — SODIUM CHLORIDE 1000 ML: 9 INJECTION, SOLUTION INTRAVENOUS at 17:32

## 2020-07-27 ASSESSMENT — PAIN SCALES - GENERAL
PAINLEVEL_OUTOF10: 9
PAINLEVEL_OUTOF10: 6
PAINLEVEL_OUTOF10: 9

## 2020-07-27 ASSESSMENT — PAIN DESCRIPTION - PAIN TYPE: TYPE: ACUTE PAIN

## 2020-07-27 ASSESSMENT — PAIN DESCRIPTION - DESCRIPTORS: DESCRIPTORS: SHARP;STABBING;THROBBING

## 2020-07-27 ASSESSMENT — PAIN DESCRIPTION - ORIENTATION: ORIENTATION: RIGHT;LEFT;MID

## 2020-07-27 ASSESSMENT — PAIN DESCRIPTION - LOCATION: LOCATION: ABDOMEN

## 2020-07-27 ASSESSMENT — PAIN DESCRIPTION - PROGRESSION: CLINICAL_PROGRESSION: NOT CHANGED

## 2020-07-27 ASSESSMENT — ENCOUNTER SYMPTOMS
DIARRHEA: 1
NAUSEA: 1
ABDOMINAL PAIN: 1
VOMITING: 1

## 2020-07-27 ASSESSMENT — PAIN DESCRIPTION - FREQUENCY: FREQUENCY: CONTINUOUS

## 2020-07-27 ASSESSMENT — PAIN - FUNCTIONAL ASSESSMENT
PAIN_FUNCTIONAL_ASSESSMENT: 0-10
PAIN_FUNCTIONAL_ASSESSMENT: ACTIVITIES ARE NOT PREVENTED

## 2020-07-27 ASSESSMENT — PAIN DESCRIPTION - ONSET: ONSET: ON-GOING

## 2020-07-27 NOTE — ED PROVIDER NOTES
[facility] ED  [address]  [place] [address]  Phone: [phone]        Pt Name: Marisol Diaz  MRN: [id number]  Tristen 1990  Date of evaluation: 7/27/20      CHIEF COMPLAINT     Chief Complaint   Patient presents with    Abdominal Pain         HISTORY OF PRESENT ILLNESS  (Location/Symptom, Timing/Onset, Context/Setting, Quality, Duration, Modifying Factors, Severity.)    Marisol Diaz is a 30 y.o. male who presents with abdominal pain. The patient states he has a history of Crohn's disease he has had multiple flareups over the last several months starting 4 days ago he developed abdominal pain 2 days ago developed nausea vomiting and diarrhea no blood in his emesis or stool he has had some subjective fever and chills the patient states that curling up in a ball does slightly help with the symptoms otherwise nothing else changes his symptoms he states this feels similar to when his Crohn's has previously flared up  He recently did get a family doctor but has not seen a gastroenterologist since moving to [address] [place] [address]    (2-9 systems for level 4, 10 or more for level 5)     Review of Systems   Constitutional: Positive for chills and fever. Gastrointestinal: Positive for abdominal pain, diarrhea, nausea and vomiting. All other systems reviewed and are negative. PAST MEDICAL HISTORY    has a past medical history of Crohn's colitis ([address] SURGICAL HISTORY      has a past surgical history that includes Colon surgery. CURRENTMEDICATIONS       Previous Medications    FAMOTIDINE (PEPCID) 20 MG TABLET    Take 1 tablet by mouth 2 times daily for 14 days    ONDANSETRON (ZOFRAN ODT) 4 MG DISINTEGRATING TABLET    Take 1 tablet by mouth every 8 hours as needed for Nausea    ONDANSETRON (ZOFRAN) 4 MG TABLET    Take 1 tablet by mouth every 8 hours as needed for Nausea       ALLERGIES     has No Known Allergies.     FAMILY HISTORY     has no family status information on file. family history is not on file. SOCIAL HISTORY      reports that he has been smoking cigarettes. He has been smoking about 0.50 packs per day. He has never used smokeless tobacco. He reports current alcohol use. He reports previous drug use. PHYSICAL EXAM    (up to 7 for level 4, 8 or more for level 5)   INITIAL VITALS:  weight is 225 lb (102.1 kg). His tympanic temperature is 98.2 °F (36.8 °C). His blood pressure is 140/85 (abnormal) and his pulse is 59. His respiration is 12 and oxygen saturation is 100%. Physical Exam  Vitals signs and nursing note reviewed. Constitutional:       Comments: Mild distress secondary to HPI   HENT:      Head: Normocephalic and atraumatic. Eyes:      Conjunctiva/sclera: Conjunctivae normal.   Neck:      Musculoskeletal: Normal range of motion and neck supple. No neck rigidity or muscular tenderness. Cardiovascular:      Rate and Rhythm: Normal rate and regular rhythm. Pulmonary:      Effort: Pulmonary effort is normal. No respiratory distress. Breath sounds: Normal breath sounds. No stridor. No wheezing or rales. Abdominal:      Palpations: Abdomen is soft. Comments: Hypoactive bowel sounds with tenderness palpation the epigastric region as well as left lateral abdomen no McBurney's area tenderness   Musculoskeletal: Normal range of motion. General: No swelling or tenderness. Lymphadenopathy:      Cervical: No cervical adenopathy. Skin:     General: Skin is warm and dry. Findings: No rash. Neurological:      General: No focal deficit present. Mental Status: He is alert.          DIFFERENTIAL DIAGNOSIS/ MDM:     We will establish an IV give the patient IV fluids I did review his previous encounters including a CT scan that was done this year that showed no inflammatory changes given this I will get an acute abdominal series check labs and a UA    DIAGNOSTIC RESULTS         RADIOLOGY:  Non-plain film images such as CT, Ultrasound and MRI are read by the radiologist. Plain radiographic images are visualized and the radiologist interpretations are reviewed as follows:     Acute abdominal series reviewed by myself shows no obstruction no free air    I did review previous imaging studies including a previous CT scan that showed no inflammatory process    Interpretation per the Radiologist below, if available at the time of this note:    XR ACUTE ABD SERIES CHEST 1 VW (Final result)   Result time 07/27/20 18:32:06   Final result by Davida Coronel MD (07/27/20 18:32:06)                 Impression:     No acute findings. Narrative:     EXAMINATION:   TWO XRAY VIEWS OF THE ABDOMEN AND SINGLE  XRAY VIEW OF THE CHEST     7/27/2020 6:06 pm     COMPARISON:   May 21, 2020     HISTORY:   ORDERING SYSTEM PROVIDED HISTORY: Pain   TECHNOLOGIST PROVIDED HISTORY:   Pain   Reason for Exam: Hx of crohn's; states abdominal pain, nausea, and vomiting   Acuity: Acute   Type of Exam: Initial     FINDINGS:   Lungs are clear.  No cardiomegaly.  No pulmonary edema.      No free air under the hemidiaphragms.  No abnormally dilated loops of small   bowel.  No abnormal calcifications.                        LABS:  Results for orders placed or performed during the hospital encounter of 07/27/20   Hepatic Function Panel   Result Value Ref Range    Alb 4.6 3.5 - 5.2 g/dL    Alkaline Phosphatase 77 40 - 129 U/L    ALT 39 5 - 41 U/L    AST 14 <40 U/L    Total Bilirubin 1.09 0.3 - 1.2 mg/dL    Bilirubin, Direct 0.21 <0.31 mg/dL    Bilirubin, Indirect 0.88 0.00 - 1.00 mg/dL    Total Protein 7.3 6.4 - 8.3 g/dL    Globulin 2.7 1.5 - 3.8 g/dL    Albumin/Globulin Ratio 1.7 1.0 - 2.5   Lipase   Result Value Ref Range    Lipase 21 13 - 60 U/L   Basic Metabolic Panel   Result Value Ref Range    Glucose 82 70 - 99 mg/dL    BUN 5 (L) 6 - 20 mg/dL    CREATININE 0.92 0.70 - 1.20 mg/dL    Bun/Cre Ratio 5 (L) 9 - 20    Calcium 9.5 8.6 - 10.4 mg/dL Sodium 140 135 - 144 mmol/L    Potassium 3.7 3.7 - 5.3 mmol/L    Chloride 100 98 - 107 mmol/L    CO2 26 20 - 31 mmol/L    Anion Gap 14 9 - 17 mmol/L    GFR Non-African American >60 >60 mL/min    GFR African American >60 >60 mL/min    GFR Comment          GFR Staging NOT REPORTED    CBC Auto Differential   Result Value Ref Range    WBC 5.3 3.5 - 11.3 k/uL    RBC 4.93 4.21 - 5.77 m/uL    Hemoglobin 14.0 13.0 - 17.0 g/dL    Hematocrit 40.3 (L) 40.7 - 50.3 %    MCV 81.7 (L) 82.6 - 102.9 fL    MCH 28.4 25.2 - 33.5 pg    MCHC 34.7 (H) 25.2 - 33.5 g/dL    RDW 12.7 11.8 - 14.4 %    Platelets 840 154 - 639 k/uL    MPV 10.7 8.1 - 13.5 fL    NRBC Automated 0.0 0.0 per 100 WBC    Differential Type NOT REPORTED     Seg Neutrophils 64 36 - 65 %    Lymphocytes 25 24 - 43 %    Monocytes 8 3 - 12 %    Eosinophils % 3 1 - 4 %    Basophils 0 0 - 2 %    Immature Granulocytes 0 0 %    Segs Absolute 3.36 1.50 - 8.10 k/uL    Absolute Lymph # 1.33 1.10 - 3.70 k/uL    Absolute Mono # 0.41 0.10 - 1.20 k/uL    Absolute Eos # 0.13 0.00 - 0.44 k/uL    Basophils Absolute <0.03 0.00 - 0.20 k/uL    Absolute Immature Granulocyte <0.03 0.00 - 0.30 k/uL    WBC Morphology NOT REPORTED     RBC Morphology MICROCYTOSIS PRESENT     Platelet Estimate NOT REPORTED    Lactic Acid   Result Value Ref Range    Lactic Acid 0.9 0.5 - 2.2 mmol/L           EMERGENCY DEPARTMENT COURSE:   Vitals:    Vitals:    07/27/20 1711 07/27/20 1813   BP: (!) 144/95 (!) 140/85   Pulse: 99 59   Resp: 14 12   Temp: 98.2 °F (36.8 °C)    TempSrc: Tympanic    SpO2: 98% 100%   Weight: 225 lb (102.1 kg)      -------------------------  BP: (!) 140/85, Temp: 98.2 °F (36.8 °C), Pulse: 59, Resp: 12      RE-EVALUATION:  Lab work is unremarkable the patient states he still having some discomfort I did review his previous encounters and see that he received some morphine in the emergency department that he states gave him good relief so I will go ahead and give him an injection of morphine the patient dates he has had Phenergan and Bentyl in the past along with prednisone that seemed to help with his Crohn's so we will write prescriptions for Phenergan suppositories Bentyl Zofran As well as prednisone. The patient further states he just recently got a family doctor I am recommending that he call his family doctor tomorrow for a follow-up appointment  I estimate there is LOW risk for ACUTE APPENDICITIS, BOWEL OBSTRUCTION, CHOLECYSTITIS, DIVERTICULITIS, INCARCERATED HERNIA, PANCREATITIS, or PERFORATED BOWEL or ULCER, thus I consider the discharge disposition reasonable. Re-evaluation of the abdomen is benign. No guarding, peritoneal signs or significant tenderness noted. Also, there is no evidence or peritonitis, sepsis, or toxicity. The patient and/or family and I have discussed the diagnosis and risks, and we agree with discharging home to follow-up with their primary doctor. The patient presents with abdominal pain without signs of peritonitis or other life-threatening or serious etiology. The patient appears stable for discharge and has been instructed to return immediately if the symptoms worsen in any way, or in 8-12 hr if not improved for re-evaluation. We also discussed returning to the Emergency Department immediately if new or worsening symptoms occur. We have discussed the symptoms which are most concerning (e.g., bloody stool, fever, changing or worsening pain, persistent vomiting, chest pain shortness of breath, numbness or weakness to the arms or legs, coolness or color change to the arms or legs) that necessitate immediate return. The patient understands that at this time there is no evidence for a more malignant underlying process, but the patient also understands that early in the process of an illness or injury, an emergency department workup can be falsely reassuring.   Routine discharge counseling was given, and the patient understands that worsening, changing or persistent symptoms should prompt an immediate call or follow up with their primary physician or return to the emergency department. The importance of appropriate follow up was also discussed. I have reviewed the disposition diagnosis with the patient and or their family/guardian. I have answered their questions and given discharge instructions. They voiced understanding of these instructions and did not have any further questions or complaints. PROCEDURES:  None    FINAL IMPRESSION      1. Generalized abdominal pain          DISPOSITION/PLAN   DISPOSITION Discharge - Pending Orders Complete 07/27/2020 06:26:56 PM      CONDITION ON DISPOSITION:   Stable    PATIENT REFERRED TO:    Your Family Doctor  Call in 1 day        DISCHARGE MEDICATIONS:  New Prescriptions    DICYCLOMINE (BENTYL) 10 MG CAPSULE    Take 1 capsule by mouth every 6 hours as needed (cramps)    ONDANSETRON (ZOFRAN) 4 MG TABLET    Take 1 tablet by mouth every 8 hours as needed for Nausea    PREDNISONE (DELTASONE) 10 MG TABLET    Take 4 tablets by mouth once daily for 5 days    PROMETHAZINE (PHENERGAN) 25 MG SUPPOSITORY    Place 1 suppository rectally every 6 hours as needed for Nausea WARNING:  May cause drowsiness. May impair ability to operate vehicles or machinery. Do not use in combination with alcohol. (Please note that portions of this note were completed with a voicerecognition program.  Efforts were made to edit the dictations but occasionally words are mis-transcribed.)    Dukes MD, F.A.C.E.P.   Attending Emergency Medicine Physician       Leticia Valero MD  07/27/20 8470

## 2020-12-15 ENCOUNTER — APPOINTMENT (OUTPATIENT)
Dept: GENERAL RADIOLOGY | Age: 30
End: 2020-12-15
Payer: MEDICAID

## 2020-12-15 ENCOUNTER — HOSPITAL ENCOUNTER (EMERGENCY)
Age: 30
Discharge: HOME | End: 2020-12-15
Attending: EMERGENCY MEDICINE
Payer: MEDICAID

## 2020-12-15 ENCOUNTER — APPOINTMENT (OUTPATIENT)
Dept: CT IMAGING | Age: 30
End: 2020-12-15
Payer: MEDICAID

## 2020-12-15 VITALS
OXYGEN SATURATION: 97 % | TEMPERATURE: 98.1 F | HEIGHT: 72 IN | DIASTOLIC BLOOD PRESSURE: 86 MMHG | WEIGHT: 250 LBS | SYSTOLIC BLOOD PRESSURE: 139 MMHG | RESPIRATION RATE: 16 BRPM | HEART RATE: 90 BPM | BODY MASS INDEX: 33.86 KG/M2

## 2020-12-15 LAB
-: NORMAL
ABSOLUTE EOS #: 0.5 K/UL (ref 0–0.44)
ABSOLUTE IMMATURE GRANULOCYTE: <0.03 K/UL (ref 0–0.3)
ABSOLUTE LYMPH #: 1.69 K/UL (ref 1.1–3.7)
ABSOLUTE MONO #: 0.51 K/UL (ref 0.1–1.2)
ALBUMIN SERPL-MCNC: 4.1 G/DL (ref 3.5–5.2)
ALBUMIN/GLOBULIN RATIO: 1.6 (ref 1–2.5)
ALP BLD-CCNC: 100 U/L (ref 40–129)
ALT SERPL-CCNC: 26 U/L (ref 5–41)
AMORPHOUS: NORMAL
ANION GAP SERPL CALCULATED.3IONS-SCNC: 10 MMOL/L (ref 9–17)
AST SERPL-CCNC: 15 U/L
BACTERIA: NORMAL
BASOPHILS # BLD: 0 % (ref 0–2)
BASOPHILS ABSOLUTE: <0.03 K/UL (ref 0–0.2)
BILIRUB SERPL-MCNC: 0.3 MG/DL (ref 0.3–1.2)
BILIRUBIN URINE: NEGATIVE
BUN BLDV-MCNC: 8 MG/DL (ref 6–20)
BUN/CREAT BLD: 9 (ref 9–20)
CALCIUM SERPL-MCNC: 9.1 MG/DL (ref 8.6–10.4)
CASTS UA: NORMAL /LPF (ref 0–2)
CHLORIDE BLD-SCNC: 106 MMOL/L (ref 98–107)
CO2: 25 MMOL/L (ref 20–31)
COLOR: NORMAL
COMMENT UA: NORMAL
CREAT SERPL-MCNC: 0.92 MG/DL (ref 0.7–1.2)
CRYSTALS, UA: NORMAL /HPF
DIFFERENTIAL TYPE: ABNORMAL
EOSINOPHILS RELATIVE PERCENT: 8 % (ref 1–4)
EPITHELIAL CELLS UA: NORMAL /HPF (ref 0–5)
GFR AFRICAN AMERICAN: >60 ML/MIN
GFR NON-AFRICAN AMERICAN: >60 ML/MIN
GFR SERPL CREATININE-BSD FRML MDRD: ABNORMAL ML/MIN/{1.73_M2}
GFR SERPL CREATININE-BSD FRML MDRD: ABNORMAL ML/MIN/{1.73_M2}
GLUCOSE BLD-MCNC: 181 MG/DL (ref 70–99)
GLUCOSE URINE: NEGATIVE
HCT VFR BLD CALC: 35.7 % (ref 40.7–50.3)
HEMOGLOBIN: 11.8 G/DL (ref 13–17)
IMMATURE GRANULOCYTES: 0 %
KETONES, URINE: NEGATIVE
LACTIC ACID: 3 MMOL/L (ref 0.5–2.2)
LEUKOCYTE ESTERASE, URINE: NEGATIVE
LIPASE: 24 U/L (ref 13–60)
LYMPHOCYTES # BLD: 28 % (ref 24–43)
MCH RBC QN AUTO: 28.7 PG (ref 25.2–33.5)
MCHC RBC AUTO-ENTMCNC: 33.1 G/DL (ref 25.2–33.5)
MCV RBC AUTO: 86.9 FL (ref 82.6–102.9)
MONOCYTES # BLD: 9 % (ref 3–12)
MUCUS: NORMAL
NITRITE, URINE: NEGATIVE
NRBC AUTOMATED: 0 PER 100 WBC
OTHER OBSERVATIONS UA: NORMAL
PDW BLD-RTO: 14 % (ref 11.8–14.4)
PH UA: 5.5 (ref 5–6)
PLATELET # BLD: 193 K/UL (ref 138–453)
PLATELET ESTIMATE: ABNORMAL
PMV BLD AUTO: 9.9 FL (ref 8.1–13.5)
POTASSIUM SERPL-SCNC: 4 MMOL/L (ref 3.7–5.3)
PROTEIN UA: NEGATIVE
RBC # BLD: 4.11 M/UL (ref 4.21–5.77)
RBC # BLD: ABNORMAL 10*6/UL
RBC UA: NORMAL /HPF (ref 0–4)
RENAL EPITHELIAL, UA: NORMAL /HPF
SEG NEUTROPHILS: 55 % (ref 36–65)
SEGMENTED NEUTROPHILS ABSOLUTE COUNT: 3.22 K/UL (ref 1.5–8.1)
SODIUM BLD-SCNC: 141 MMOL/L (ref 135–144)
SPECIFIC GRAVITY UA: 1.01 (ref 1.01–1.02)
TOTAL PROTEIN: 6.6 G/DL (ref 6.4–8.3)
TRICHOMONAS: NORMAL
TROPONIN INTERP: NORMAL
TROPONIN INTERP: NORMAL
TROPONIN T: NORMAL NG/ML
TROPONIN T: NORMAL NG/ML
TROPONIN, HIGH SENSITIVITY: <6 NG/L (ref 0–22)
TROPONIN, HIGH SENSITIVITY: <6 NG/L (ref 0–22)
TURBIDITY: NORMAL
URINE HGB: NEGATIVE
UROBILINOGEN, URINE: NORMAL
WBC # BLD: 6 K/UL (ref 3.5–11.3)
WBC # BLD: ABNORMAL 10*3/UL
WBC UA: NORMAL /HPF (ref 0–4)
YEAST: NORMAL

## 2020-12-15 PROCEDURE — 80053 COMPREHEN METABOLIC PANEL: CPT

## 2020-12-15 PROCEDURE — 93005 ELECTROCARDIOGRAM TRACING: CPT | Performed by: EMERGENCY MEDICINE

## 2020-12-15 PROCEDURE — 99284 EMERGENCY DEPT VISIT MOD MDM: CPT

## 2020-12-15 PROCEDURE — 6360000004 HC RX CONTRAST MEDICATION: Performed by: EMERGENCY MEDICINE

## 2020-12-15 PROCEDURE — 83690 ASSAY OF LIPASE: CPT

## 2020-12-15 PROCEDURE — 96374 THER/PROPH/DIAG INJ IV PUSH: CPT

## 2020-12-15 PROCEDURE — 85025 COMPLETE CBC W/AUTO DIFF WBC: CPT

## 2020-12-15 PROCEDURE — 2580000003 HC RX 258: Performed by: EMERGENCY MEDICINE

## 2020-12-15 PROCEDURE — 83605 ASSAY OF LACTIC ACID: CPT

## 2020-12-15 PROCEDURE — 2709999900 CT ABDOMEN PELVIS W IV CONTRAST

## 2020-12-15 PROCEDURE — 71045 X-RAY EXAM CHEST 1 VIEW: CPT

## 2020-12-15 PROCEDURE — 81001 URINALYSIS AUTO W/SCOPE: CPT

## 2020-12-15 PROCEDURE — 84484 ASSAY OF TROPONIN QUANT: CPT

## 2020-12-15 PROCEDURE — 36415 COLL VENOUS BLD VENIPUNCTURE: CPT

## 2020-12-15 PROCEDURE — 6360000002 HC RX W HCPCS: Performed by: EMERGENCY MEDICINE

## 2020-12-15 RX ORDER — PROMETHAZINE HYDROCHLORIDE 25 MG/ML
12.5 INJECTION, SOLUTION INTRAMUSCULAR; INTRAVENOUS ONCE
Status: COMPLETED | OUTPATIENT
Start: 2020-12-15 | End: 2020-12-15

## 2020-12-15 RX ORDER — DICYCLOMINE HYDROCHLORIDE 10 MG/ML
20 INJECTION INTRAMUSCULAR ONCE
Status: DISCONTINUED | OUTPATIENT
Start: 2020-12-15 | End: 2020-12-15 | Stop reason: HOSPADM

## 2020-12-15 RX ORDER — POLYETHYLENE GLYCOL 3350 17 G/17G
17 POWDER, FOR SOLUTION ORAL DAILY
Qty: 1 BOTTLE | Refills: 0 | Status: SHIPPED | OUTPATIENT
Start: 2020-12-15 | End: 2020-12-15 | Stop reason: CLARIF

## 2020-12-15 RX ORDER — PROMETHAZINE HYDROCHLORIDE 6.25 MG/5ML
25 SYRUP ORAL 4 TIMES DAILY PRN
Qty: 300 ML | Refills: 0 | Status: SHIPPED | OUTPATIENT
Start: 2020-12-15 | End: 2021-03-09 | Stop reason: SDUPTHER

## 2020-12-15 RX ORDER — 0.9 % SODIUM CHLORIDE 0.9 %
1000 INTRAVENOUS SOLUTION INTRAVENOUS ONCE
Status: COMPLETED | OUTPATIENT
Start: 2020-12-15 | End: 2020-12-15

## 2020-12-15 RX ADMIN — SODIUM CHLORIDE 1000 ML: 9 INJECTION, SOLUTION INTRAVENOUS at 11:43

## 2020-12-15 RX ADMIN — PROMETHAZINE HYDROCHLORIDE 12.5 MG: 25 INJECTION INTRAMUSCULAR; INTRAVENOUS at 11:43

## 2020-12-15 RX ADMIN — IOPAMIDOL 100 ML: 755 INJECTION, SOLUTION INTRAVENOUS at 11:56

## 2020-12-15 ASSESSMENT — ENCOUNTER SYMPTOMS
CONSTIPATION: 0
NAUSEA: 1
BLOOD IN STOOL: 0
BACK PAIN: 0
SORE THROAT: 0
VOMITING: 0
WHEEZING: 0
DIARRHEA: 0
ABDOMINAL PAIN: 1
CHEST TIGHTNESS: 1
TROUBLE SWALLOWING: 0
SHORTNESS OF BREATH: 0

## 2020-12-15 ASSESSMENT — PAIN DESCRIPTION - PAIN TYPE: TYPE: ACUTE PAIN

## 2020-12-15 ASSESSMENT — PAIN DESCRIPTION - LOCATION: LOCATION: ABDOMEN;CHEST;BACK

## 2020-12-15 ASSESSMENT — PAIN SCALES - GENERAL: PAINLEVEL_OUTOF10: 8

## 2020-12-15 NOTE — ED PROVIDER NOTES
Current Discharge Medication List      CONTINUE these medications which have NOT CHANGED    Details   ondansetron (ZOFRAN) 4 MG tablet Take 1 tablet by mouth every 8 hours as needed for Nausea  Qty: 20 tablet, Refills: 0             ALLERGIES     has No Known Allergies. FAMILY HISTORY     has no family status information on file. family history is not on file. SOCIAL HISTORY      reports that he has been smoking cigarettes. He has been smoking about 0.50 packs per day. He has never used smokeless tobacco. He reports current alcohol use. He reports previous drug use. PHYSICAL EXAM     INITIAL VITALS:  height is 6' (1.829 m) and weight is 250 lb (113.4 kg). His oral temperature is 98.1 °F (36.7 °C). His blood pressure is 149/91 (abnormal) and his pulse is 81. His respiration is 16 and oxygen saturation is 100%. Physical Exam  Constitutional:       General: He is not in acute distress. Appearance: He is well-developed. He is obese. He is not ill-appearing, toxic-appearing or diaphoretic. HENT:      Head: Normocephalic and atraumatic. Right Ear: External ear normal.      Left Ear: External ear normal.   Eyes:      Pupils: Pupils are equal, round, and reactive to light. Neck:      Musculoskeletal: Normal range of motion and neck supple. Cardiovascular:      Rate and Rhythm: Normal rate and regular rhythm. Pulmonary:      Effort: Pulmonary effort is normal.      Breath sounds: Normal breath sounds. Abdominal:      General: Abdomen is flat. A surgical scar is present. Bowel sounds are normal. There is no distension. Palpations: Abdomen is soft. Tenderness: There is generalized abdominal tenderness. There is no right CVA tenderness, left CVA tenderness, guarding or rebound. Negative signs include De Santiago's sign. Musculoskeletal: Normal range of motion. Skin:     General: Skin is warm and dry. Neurological:      General: No focal deficit present.       Mental Status: He is alert and oriented to person, place, and time. Psychiatric:         Mood and Affect: Mood normal.         Behavior: Behavior normal.           DIFFERENTIAL DIAGNOSIS/ MDM:     Chest and abdominal pain will do a work-up    DIAGNOSTIC RESULTS     EKG: All EKG's are interpreted by the Emergency Department Physician who either signs or Co-signs this chart in the absence of a cardiologist.  Normal sinus rhythm with sinus arrhythmia rate of 69 bpm GA interval is 190 ms QRS durations 112 ms QT corrected 405 ms axis is 47 there is no acute ST or T wave changes seen    Repeat EKG shows normal sinus rhythm at 75 bpm GA interval is 194 ms QRS duration 94 ms QT corrected 408 ms axis is 38 there is no acute ST or T wave changes seen  RADIOLOGY:   I directly visualized the following  images and reviewed the radiologist interpretations:       EXAMINATION:   CT OF THE ABDOMEN AND PELVIS WITH CONTRAST 12/15/2020 11:56 am       TECHNIQUE:   CT of the abdomen and pelvis was performed with the administration of   intravenous contrast. Multiplanar reformatted images are provided for review. Dose modulation, iterative reconstruction, and/or weight based adjustment of   the mA/kV was utilized to reduce the radiation dose to as low as reasonably   achievable.       COMPARISON:   CT abdomen and pelvis 03/18/2020       HISTORY:   ORDERING SYSTEM PROVIDED HISTORY: Acute mid epigastric abdominal pain,   nausea, describes history of Crohn disease status post prior colon resection   TECHNOLOGIST PROVIDED HISTORY:   IV Only Contrast   Pain   Reason for Exam: c/o nausea   Acuity: Acute   Type of Exam: Initial       FINDINGS:   Lower Chest: Visualized portions of the lungs are clear.  Cardiac and   posterior mediastinal structures visualized are unremarkable.       Organs: Normal attenuation throughout the liver.  No discrete hepatic lesion   or intrahepatic bile duct dilatation is seen.  The gallbladder, kidneys,   spleen, adrenal glands shortness of breath that started this morning   Acuity: Acute   Type of Exam: Initial       FINDINGS:   Single portable frontal view of the chest is submitted for review.  The   cardiac silhouette is normal in size.  Lung parenchyma is clear without focal   airspace consolidation, sizeable pleural effusion, or pneumothorax.  Trachea   is midline.  Visualized osseous structures and soft tissues are grossly   intact.           Impression   No acute cardiopulmonary pathology.                 ED BEDSIDE ULTRASOUND:       LABS:  Labs Reviewed   CBC WITH AUTO DIFFERENTIAL - Abnormal; Notable for the following components:       Result Value    RBC 4.11 (*)     Hemoglobin 11.8 (*)     Hematocrit 35.7 (*)     Eosinophils % 8 (*)     Absolute Eos # 0.50 (*)     All other components within normal limits   COMPREHENSIVE METABOLIC PANEL - Abnormal; Notable for the following components:    Glucose 181 (*)     All other components within normal limits   LACTIC ACID - Abnormal; Notable for the following components:    Lactic Acid 3.0 (*)     All other components within normal limits   LIPASE   URINALYSIS   TROPONIN   MICROSCOPIC URINALYSIS   TROPONIN           EMERGENCY DEPARTMENT COURSE:   Vitals:    Vitals:    12/15/20 1109   BP: (!) 149/91   Pulse: 81   Resp: 16   Temp: 98.1 °F (36.7 °C)   TempSrc: Oral   SpO2: 100%   Weight: 250 lb (113.4 kg)   Height: 6' (1.829 m)     -------------------------  BP: (!) 149/91, Temp: 98.1 °F (36.7 °C), Pulse: 81, Resp: 16        Re-evaluation Notes    Patient resting comfortably    CRITICAL CARE:   None        CONSULTS:      PROCEDURES:  None    FINAL IMPRESSION      1. Generalized abdominal pain          DISPOSITION/PLAN   DISPOSITION discharge  Condition on Disposition    Stable    PATIENT REFERRED TO:  No follow-up provider specified.     DISCHARGE MEDICATIONS:  Current Discharge Medication List          (Please note that portions of this note were completed with a voice recognition program. Efforts were made to edit the dictations but occasionally words are mis-transcribed.)    Dinh MD, F.A.A.E.M.   Attending Emergency Physician                          Freddie Kaur MD  12/15/20 [id number]

## 2020-12-16 LAB
EKG ATRIAL RATE: 69 BPM
EKG ATRIAL RATE: 75 BPM
EKG P AXIS: 57 DEGREES
EKG P AXIS: 59 DEGREES
EKG P-R INTERVAL: 190 MS
EKG P-R INTERVAL: 194 MS
EKG Q-T INTERVAL: 366 MS
EKG Q-T INTERVAL: 378 MS
EKG QRS DURATION: 112 MS
EKG QRS DURATION: 94 MS
EKG QTC CALCULATION (BAZETT): 405 MS
EKG QTC CALCULATION (BAZETT): 408 MS
EKG R AXIS: 38 DEGREES
EKG R AXIS: 47 DEGREES
EKG T AXIS: 22 DEGREES
EKG T AXIS: 27 DEGREES
EKG VENTRICULAR RATE: 69 BPM
EKG VENTRICULAR RATE: 75 BPM

## 2021-03-07 ENCOUNTER — HOSPITAL ENCOUNTER (EMERGENCY)
Age: 31
Discharge: HOME | End: 2021-03-07
Attending: EMERGENCY MEDICINE
Payer: MEDICAID

## 2021-03-07 ENCOUNTER — APPOINTMENT (OUTPATIENT)
Dept: CT IMAGING | Age: 31
End: 2021-03-07
Payer: MEDICAID

## 2021-03-07 VITALS
TEMPERATURE: 97.4 F | HEART RATE: 77 BPM | DIASTOLIC BLOOD PRESSURE: 102 MMHG | OXYGEN SATURATION: 100 % | WEIGHT: 220 LBS | RESPIRATION RATE: 14 BRPM | BODY MASS INDEX: 29.84 KG/M2 | SYSTOLIC BLOOD PRESSURE: 133 MMHG

## 2021-03-07 DIAGNOSIS — R10.84 GENERALIZED ABDOMINAL PAIN: Primary | ICD-10-CM

## 2021-03-07 LAB
ABSOLUTE EOS #: 0.33 K/UL (ref 0–0.44)
ABSOLUTE IMMATURE GRANULOCYTE: <0.03 K/UL (ref 0–0.3)
ABSOLUTE LYMPH #: 1.11 K/UL (ref 1.1–3.7)
ABSOLUTE MONO #: 0.56 K/UL (ref 0.1–1.2)
ALBUMIN SERPL-MCNC: 4.6 G/DL (ref 3.5–5.2)
ALBUMIN/GLOBULIN RATIO: 1.6 (ref 1–2.5)
ALP BLD-CCNC: 85 U/L (ref 40–129)
ALT SERPL-CCNC: 24 U/L (ref 5–41)
AMYLASE: 44 U/L (ref 28–100)
ANION GAP SERPL CALCULATED.3IONS-SCNC: 10 MMOL/L (ref 9–17)
AST SERPL-CCNC: 16 U/L
BASOPHILS # BLD: 1 % (ref 0–2)
BASOPHILS ABSOLUTE: 0.03 K/UL (ref 0–0.2)
BILIRUB SERPL-MCNC: 1.23 MG/DL (ref 0.3–1.2)
BUN BLDV-MCNC: 11 MG/DL (ref 6–20)
BUN/CREAT BLD: 12 (ref 9–20)
CALCIUM SERPL-MCNC: 9.8 MG/DL (ref 8.6–10.4)
CHLORIDE BLD-SCNC: 104 MMOL/L (ref 98–107)
CK MB: 2.4 NG/ML
CO2: 25 MMOL/L (ref 20–31)
CREAT SERPL-MCNC: 0.92 MG/DL (ref 0.7–1.2)
DIFFERENTIAL TYPE: ABNORMAL
EOSINOPHILS RELATIVE PERCENT: 7 % (ref 1–4)
GFR AFRICAN AMERICAN: >60 ML/MIN
GFR NON-AFRICAN AMERICAN: >60 ML/MIN
GFR SERPL CREATININE-BSD FRML MDRD: ABNORMAL ML/MIN/{1.73_M2}
GFR SERPL CREATININE-BSD FRML MDRD: ABNORMAL ML/MIN/{1.73_M2}
GLUCOSE BLD-MCNC: 115 MG/DL (ref 70–99)
HCT VFR BLD CALC: 41.1 % (ref 40.7–50.3)
HEMOGLOBIN: 14 G/DL (ref 13–17)
IMMATURE GRANULOCYTES: 0 %
LACTIC ACID: 0.9 MMOL/L (ref 0.5–2.2)
LIPASE: 22 U/L (ref 13–60)
LYMPHOCYTES # BLD: 24 % (ref 24–43)
MCH RBC QN AUTO: 28.9 PG (ref 25.2–33.5)
MCHC RBC AUTO-ENTMCNC: 34.1 G/DL (ref 25.2–33.5)
MCV RBC AUTO: 84.7 FL (ref 82.6–102.9)
MONOCYTES # BLD: 12 % (ref 3–12)
NRBC AUTOMATED: 0 PER 100 WBC
PDW BLD-RTO: 12.9 % (ref 11.8–14.4)
PLATELET # BLD: 239 K/UL (ref 138–453)
PLATELET ESTIMATE: ABNORMAL
PMV BLD AUTO: 10.4 FL (ref 8.1–13.5)
POTASSIUM SERPL-SCNC: 4 MMOL/L (ref 3.7–5.3)
RBC # BLD: 4.85 M/UL (ref 4.21–5.77)
RBC # BLD: ABNORMAL 10*6/UL
SEG NEUTROPHILS: 56 % (ref 36–65)
SEGMENTED NEUTROPHILS ABSOLUTE COUNT: 2.6 K/UL (ref 1.5–8.1)
SODIUM BLD-SCNC: 139 MMOL/L (ref 135–144)
TOTAL PROTEIN: 7.4 G/DL (ref 6.4–8.3)
WBC # BLD: 4.6 K/UL (ref 3.5–11.3)
WBC # BLD: ABNORMAL 10*3/UL

## 2021-03-07 PROCEDURE — 82150 ASSAY OF AMYLASE: CPT

## 2021-03-07 PROCEDURE — 80053 COMPREHEN METABOLIC PANEL: CPT

## 2021-03-07 PROCEDURE — 6360000004 HC RX CONTRAST MEDICATION: Performed by: EMERGENCY MEDICINE

## 2021-03-07 PROCEDURE — 96375 TX/PRO/DX INJ NEW DRUG ADDON: CPT

## 2021-03-07 PROCEDURE — 2709999900 CT ABDOMEN PELVIS W IV CONTRAST

## 2021-03-07 PROCEDURE — 85025 COMPLETE CBC W/AUTO DIFF WBC: CPT

## 2021-03-07 PROCEDURE — 83690 ASSAY OF LIPASE: CPT

## 2021-03-07 PROCEDURE — 6360000002 HC RX W HCPCS: Performed by: EMERGENCY MEDICINE

## 2021-03-07 PROCEDURE — 83605 ASSAY OF LACTIC ACID: CPT

## 2021-03-07 PROCEDURE — 2580000003 HC RX 258: Performed by: EMERGENCY MEDICINE

## 2021-03-07 PROCEDURE — 99284 EMERGENCY DEPT VISIT MOD MDM: CPT

## 2021-03-07 PROCEDURE — 96374 THER/PROPH/DIAG INJ IV PUSH: CPT

## 2021-03-07 PROCEDURE — 82553 CREATINE MB FRACTION: CPT

## 2021-03-07 RX ORDER — KETOROLAC TROMETHAMINE 30 MG/ML
30 INJECTION, SOLUTION INTRAMUSCULAR; INTRAVENOUS ONCE
Status: COMPLETED | OUTPATIENT
Start: 2021-03-07 | End: 2021-03-07

## 2021-03-07 RX ORDER — MORPHINE SULFATE 4 MG/ML
4 INJECTION, SOLUTION INTRAMUSCULAR; INTRAVENOUS ONCE
Status: COMPLETED | OUTPATIENT
Start: 2021-03-07 | End: 2021-03-07

## 2021-03-07 RX ORDER — 0.9 % SODIUM CHLORIDE 0.9 %
1000 INTRAVENOUS SOLUTION INTRAVENOUS ONCE
Status: COMPLETED | OUTPATIENT
Start: 2021-03-07 | End: 2021-03-07

## 2021-03-07 RX ORDER — ONDANSETRON 4 MG/1
4 TABLET, ORALLY DISINTEGRATING ORAL EVERY 8 HOURS PRN
Qty: 20 TABLET | Refills: 0 | Status: SHIPPED | OUTPATIENT
Start: 2021-03-07 | End: 2021-07-15

## 2021-03-07 RX ORDER — DICYCLOMINE HYDROCHLORIDE 10 MG/1
10 CAPSULE ORAL EVERY 6 HOURS PRN
Qty: 20 CAPSULE | Refills: 0 | Status: SHIPPED | OUTPATIENT
Start: 2021-03-07 | End: 2021-07-15

## 2021-03-07 RX ORDER — PROMETHAZINE HYDROCHLORIDE 25 MG/ML
12.5 INJECTION, SOLUTION INTRAMUSCULAR; INTRAVENOUS ONCE
Status: COMPLETED | OUTPATIENT
Start: 2021-03-07 | End: 2021-03-07

## 2021-03-07 RX ORDER — ONDANSETRON 2 MG/ML
4 INJECTION INTRAMUSCULAR; INTRAVENOUS ONCE
Status: COMPLETED | OUTPATIENT
Start: 2021-03-07 | End: 2021-03-07

## 2021-03-07 RX ADMIN — IOPAMIDOL 100 ML: 755 INJECTION, SOLUTION INTRAVENOUS at 11:18

## 2021-03-07 RX ADMIN — ONDANSETRON 4 MG: 2 INJECTION INTRAMUSCULAR; INTRAVENOUS at 12:22

## 2021-03-07 RX ADMIN — SODIUM CHLORIDE 1000 ML: 9 INJECTION, SOLUTION INTRAVENOUS at 10:51

## 2021-03-07 RX ADMIN — MORPHINE SULFATE 4 MG: 4 INJECTION, SOLUTION INTRAMUSCULAR; INTRAVENOUS at 11:08

## 2021-03-07 RX ADMIN — KETOROLAC TROMETHAMINE 30 MG: 30 INJECTION, SOLUTION INTRAMUSCULAR at 10:52

## 2021-03-07 RX ADMIN — PROMETHAZINE HYDROCHLORIDE 12.5 MG: 25 INJECTION INTRAMUSCULAR; INTRAVENOUS at 10:54

## 2021-03-07 ASSESSMENT — ENCOUNTER SYMPTOMS
WHEEZING: 0
SHORTNESS OF BREATH: 0
TROUBLE SWALLOWING: 0
ABDOMINAL PAIN: 1
CONSTIPATION: 0
NAUSEA: 1
DIARRHEA: 1
BACK PAIN: 0
BLOOD IN STOOL: 0
VOMITING: 1
SORE THROAT: 0

## 2021-03-07 ASSESSMENT — PAIN DESCRIPTION - PROGRESSION: CLINICAL_PROGRESSION: NOT CHANGED

## 2021-03-07 ASSESSMENT — PAIN SCALES - GENERAL
PAINLEVEL_OUTOF10: 8
PAINLEVEL_OUTOF10: 6
PAINLEVEL_OUTOF10: 9

## 2021-03-07 ASSESSMENT — PAIN DESCRIPTION - ONSET: ONSET: ON-GOING

## 2021-03-07 ASSESSMENT — PAIN DESCRIPTION - FREQUENCY: FREQUENCY: CONTINUOUS

## 2021-03-07 NOTE — ED PROVIDER NOTES
[facility]  eMERGENCY dEPARTMENT eNCOUnter      Pt Name: Lake Farley  MRN: [id number]  [place] 1990  Date of evaluation: 3/7/2021      CHIEF COMPLAINT       Chief Complaint   Patient presents with    Abdominal Pain         HISTORY OF PRESENT ILLNESS    Lake Farley is a 35 y.o. male who presents complaint of abdominal pains been going on for the last 3 to 4 days he has had a history of colitis secondary to Crohn's he is also had a partial colon resection there is been no fevers but he has felt chilled has had some loose stools with nausea and vomiting and diffuse abdominal pain are his main complaints patient says it feels like a normal flare      REVIEW OF SYSTEMS         Review of Systems   Constitutional: Positive for appetite change and chills. Negative for fever. HENT: Negative for congestion, dental problem, sore throat and trouble swallowing. Eyes: Negative for visual disturbance. Respiratory: Negative for shortness of breath and wheezing. Cardiovascular: Negative for chest pain, palpitations and leg swelling. Gastrointestinal: Positive for abdominal pain, diarrhea, nausea and vomiting. Negative for blood in stool and constipation. Genitourinary: Negative for difficulty urinating, dysuria and testicular pain. Musculoskeletal: Negative for back pain, joint swelling and neck pain. Skin: Negative for rash. Neurological: Negative for dizziness, syncope, weakness and headaches. Hematological: Negative for adenopathy. Does not bruise/bleed easily. Psychiatric/Behavioral: Negative for confusion and suicidal ideas. All other systems reviewed and are negative. PAST MEDICAL HISTORY    has a past medical history of Crohn's colitis ([facility] [address] SURGICAL HISTORY      has a past surgical history that includes Colon surgery.     CURRENT MEDICATIONS       Previous Medications    ONDANSETRON (ZOFRAN) 4 MG TABLET    Take 1 tablet by mouth every 8 hours as needed for Nausea PROMETHAZINE (PHENERGAN) 6.25 MG/5ML SYRUP    Take 20 mLs by mouth 4 times daily as needed for Nausea       ALLERGIES     has No Known Allergies. FAMILY HISTORY     has no family status information on file. family history is not on file. SOCIAL HISTORY      reports that he has been smoking cigarettes. He has been smoking about 0.50 packs per day. He has never used smokeless tobacco. He reports current alcohol use. He reports previous drug use. PHYSICAL EXAM     INITIAL VITALS:  weight is 220 lb (99.8 kg). His tympanic temperature is 97.4 °F (36.3 °C). His blood pressure is 133/102 (abnormal) and his pulse is 77. His respiration is 14 and oxygen saturation is 100%. Physical Exam  Constitutional:       Appearance: He is well-developed. HENT:      Head: Normocephalic and atraumatic. Right Ear: External ear normal.      Left Ear: External ear normal.   Eyes:      Pupils: Pupils are equal, round, and reactive to light. Neck:      Musculoskeletal: Normal range of motion and neck supple. Cardiovascular:      Rate and Rhythm: Normal rate and regular rhythm. Pulmonary:      Effort: Pulmonary effort is normal.      Breath sounds: Normal breath sounds. Abdominal:      General: Bowel sounds are normal.      Palpations: Abdomen is soft. Tenderness: There is generalized abdominal tenderness. Musculoskeletal: Normal range of motion. Skin:     General: Skin is warm and dry. Neurological:      General: No focal deficit present. Mental Status: He is alert and oriented to person, place, and time.    Psychiatric:         Behavior: Behavior normal.           DIFFERENTIAL DIAGNOSIS/ MDM:     Abdominal pain rule out Crohn's flare will do a work-up    DIAGNOSTIC RESULTS     EKG: All EKG's are interpreted by the Emergency Department Physician who either signs or Co-signs this chart in the absence of a cardiologist.        RADIOLOGY:   I directly visualized the following  images and reviewed the radiologist interpretations:       CT OF THE ABDOMEN AND PELVIS WITH CONTRAST 3/7/2021 11:18 am       TECHNIQUE:   CT of the abdomen and pelvis was performed with the administration of   intravenous contrast. Multiplanar reformatted images are provided for review. Dose modulation, iterative reconstruction, and/or weight based adjustment of   the mA/kV was utilized to reduce the radiation dose to as low as reasonably   achievable.       COMPARISON:   None.       HISTORY:   ORDERING SYSTEM PROVIDED HISTORY: Abdominal pain with nausea vomiting history   of Crohn's       Reason for Exam: Abdominal pain for about 2-3 days; nausea; hx of crohn's,   part of colon removed   Acuity: Acute   Type of Exam: Initial       FINDINGS:   Lower Chest: Normal heart size.  Lung bases clear.       Organs: Possible hepatic fatty infiltration.  Spleen, pancreas, adrenal   glands, left kidney and gallbladder appear unremarkable.  Few punctate   hypodense lesions in the right kidney too small to characterize but appear   benign likely cysts.       GI/Bowel: No evidence of bowel obstruction or inflammation.  Stable   postsurgical changes in the right lower quadrant.       Pelvis: Bladder and prostate appear grossly unremarkable       Peritoneum/Retroperitoneum: Normal caliber abdominal aorta.  No suspicious   lymphadenopathy.  No ascites or free air.       Bones/Soft Tissues: No significant osseous abnormality.           Impression   No evidence of acute process with few incidental/chronic findings as   described.                   ED BEDSIDE ULTRASOUND:       LABS:  Labs Reviewed   CBC WITH AUTO DIFFERENTIAL - Abnormal; Notable for the following components:       Result Value    MCHC 34.1 (*)     Eosinophils % 7 (*)     All other components within normal limits   COMPREHENSIVE METABOLIC PANEL - Abnormal; Notable for the following components:    Glucose 115 (*)     Total Bilirubin 1.23 (*)     All other components within normal limits CK-MB   LACTIC ACID   LIPASE   AMYLASE           EMERGENCY DEPARTMENT COURSE:   Vitals:    Vitals:    03/07/21 1033 03/07/21 1100   BP: (!) 141/92 (!) 133/102   Pulse: 68 77   Resp: 14    Temp: 97.4 °F (36.3 °C)    TempSrc: Tympanic    SpO2: 97% 100%   Weight: 220 lb (99.8 kg)      -------------------------  BP: (!) 133/102, Temp: 97.4 °F (36.3 °C), Pulse: 77, Resp: 14        Re-evaluation Notes  Patient is feeling better he does have a ride home    CRITICAL CARE:   None        CONSULTS:      PROCEDURES:  None    FINAL IMPRESSION      1. Generalized abdominal pain          DISPOSITION/PLAN   DISPOSITION Decision To Discharge    Condition on Disposition    Stable    PATIENT REFERRED TO:  Your physician as scheduled            DISCHARGE MEDICATIONS:  New Prescriptions    DICYCLOMINE (BENTYL) 10 MG CAPSULE    Take 1 capsule by mouth every 6 hours as needed (cramps)    ONDANSETRON (ZOFRAN ODT) 4 MG DISINTEGRATING TABLET    Take 1 tablet by mouth every 8 hours as needed for Nausea       (Please note that portions of this note were completed with a voice recognition program.  Efforts were made to edit the dictations but occasionally words are mis-transcribed.)    Bruce MD, F.A.A.E.M.   Attending Emergency Physician                          Sue Young MD  03/07/21 2707

## 2021-03-09 ENCOUNTER — OFFICE VISIT (OUTPATIENT)
Dept: FAMILY MEDICINE CLINIC | Age: 31
End: 2021-03-09
Payer: MEDICAID

## 2021-03-09 VITALS
BODY MASS INDEX: 31.66 KG/M2 | HEART RATE: 66 BPM | RESPIRATION RATE: 14 BRPM | HEIGHT: 76 IN | DIASTOLIC BLOOD PRESSURE: 82 MMHG | OXYGEN SATURATION: 99 % | SYSTOLIC BLOOD PRESSURE: 126 MMHG | WEIGHT: 260 LBS

## 2021-03-09 DIAGNOSIS — G89.29 CHRONIC ABDOMINAL PAIN: ICD-10-CM

## 2021-03-09 DIAGNOSIS — K50.019 CROHN'S DISEASE OF SMALL INTESTINE WITH COMPLICATION (HCC): Primary | ICD-10-CM

## 2021-03-09 DIAGNOSIS — R10.9 CHRONIC ABDOMINAL PAIN: ICD-10-CM

## 2021-03-09 PROCEDURE — G8427 DOCREV CUR MEDS BY ELIG CLIN: HCPCS | Performed by: FAMILY MEDICINE

## 2021-03-09 PROCEDURE — G8484 FLU IMMUNIZE NO ADMIN: HCPCS | Performed by: FAMILY MEDICINE

## 2021-03-09 PROCEDURE — 99204 OFFICE O/P NEW MOD 45 MIN: CPT | Performed by: FAMILY MEDICINE

## 2021-03-09 PROCEDURE — G8417 CALC BMI ABV UP PARAM F/U: HCPCS | Performed by: FAMILY MEDICINE

## 2021-03-09 PROCEDURE — 99203 OFFICE O/P NEW LOW 30 MIN: CPT

## 2021-03-09 PROCEDURE — 4004F PT TOBACCO SCREEN RCVD TLK: CPT | Performed by: FAMILY MEDICINE

## 2021-03-09 RX ORDER — PROMETHAZINE HYDROCHLORIDE 6.25 MG/5ML
25 SYRUP ORAL 4 TIMES DAILY PRN
Qty: 300 ML | Refills: 0 | Status: SHIPPED | OUTPATIENT
Start: 2021-03-09 | End: 2021-03-17 | Stop reason: SDUPTHER

## 2021-03-09 ASSESSMENT — PATIENT HEALTH QUESTIONNAIRE - PHQ9: SUM OF ALL RESPONSES TO PHQ QUESTIONS 1-9: 0

## 2021-03-09 NOTE — PROGRESS NOTES
HPI:  Patient comes in today for   Chief Complaint   Patient presents with    Abdominal Pain     says has been having a lot of pain from chrons     Other     would like to have referral to GI and Pain management    Here to establish has h/o crohn's disease with intermittent abdominal pain ,has loose stools,no blood in stools. Has had  Two bowel surgeries in past while in [facility] . Seen GI  Last  while in [place] about 2 years ago, moved here recently . Pain has been intense at times . Seen in ER 2 days ago was given Zofran for nausea has not helped much with nausea. usually liquid phenergan helps him better. Has seen pain management in past for chronic abdominal pain    HISTORY:  Past Medical History:   Diagnosis Date    Crohn's colitis [facility])        Past Surgical History:   Procedure Laterality Date    COLON SURGERY          No family history on file.     Social History     Socioeconomic History    Marital status: Single     Spouse name: Not on file    Number of children: Not on file    Years of education: Not on file    Highest education level: Not on file   Occupational History    Not on file   Social Needs    Financial resource strain: Not on file    Food insecurity     Worry: Not on file     Inability: Not on file    Transportation needs     Medical: Not on file     Non-medical: Not on file   Tobacco Use    Smoking status: Current Every Day Smoker     Packs/day: 0.50     Types: Cigarettes    Smokeless tobacco: Never Used   Substance and Sexual Activity    Alcohol use: Yes     Comment: socially    Drug use: Not Currently    Sexual activity: Not on file   Lifestyle    Physical activity     Days per week: Not on file     Minutes per session: Not on file    Stress: Not on file   Relationships    Social connections     Talks on phone: Not on file     Gets together: Not on file     Attends [employer] service: Not on file     Active member of club or organization: Not on file     Attends meetings of clubs or organizations: Not on file     Relationship status: Not on file    Intimate partner violence     Fear of current or ex partner: Not on file     Emotionally abused: Not on file     Physically abused: Not on file     Forced sexual activity: Not on file   Other Topics Concern    Not on file   Social History Narrative    Not on file       Current Outpatient Medications   Medication Sig Dispense Refill    ondansetron (ZOFRAN ODT) 4 MG disintegrating tablet Take 1 tablet by mouth every 8 hours as needed for Nausea 20 tablet 0    promethazine (PHENERGAN) 6.25 MG/5ML syrup Take 20 mLs by mouth 4 times daily as needed for Nausea 300 mL 0    dicyclomine (BENTYL) 10 MG capsule Take 1 capsule by mouth every 6 hours as needed (cramps) (Patient not taking: Reported on 3/9/2021) 20 capsule 0     No current facility-administered medications for this visit. No Known Allergies    REVIEW OF SYSTEMS:  General: No fevers, chills, change in weight  HEENT: No double vision, blurry vision, runny nose, sore throat, tinnitus  Cardio: No chest pain, palpitations, KIM, edema, PND  Pulmonary: No cough, hemoptysis, SOB  GI: Has nausea,no  vomiting, dysphagia, odynophagia,has  Diarrhea,no blood in stools  : No dysuria, hematuria, urgency, incontinence  Musculoskeletal: No muscle or joint aches, no joint swelling  Neuro: No dizziness/lightheadedness, no seizures  Endocrine: No polyuria, polydipsia, polyphagia, no temperature intolerance  Skin: No lesions or itching  No problems with ADLs  Sleep: Good  Psychiatric: No depression or anxiety    PHYSICAL EXAM:  VS:  /82   Pulse 66   Resp 14   Ht 6' 4\" (1.93 m)   Wt 260 lb (117.9 kg)   SpO2 99%   BMI 31.65 kg/m²   General:  Alert and oriented, NAD  HEENT:  TMs, NINI, EOMI, Conjunctivae clear       Throat currently clear.   NECK:  Supple without adenopathy or thyromegaly, no carotid bruits  LUNGS:  CTA all fields  HEART:  RRR without M, R, or G  ABDOMEN:  Mildly obese,soft ,non tender  without palpable abnormalities  EXTREMITIES:  Without clubbing, cyanosis, or edema, no calf tenderness  NEURO:  No focal deficits. SKIN:  warm to touch,normal texture. No active lesions. ASSESSMENT/PLAN:     Diagnosis Orders   1. Crohn's disease of small intestine with complication ([facility] [address]  SHIRIN Lorenzana MD, Gastroenterology, Tre Myles MD, Pain Management, [place]   2. Chronic abdominal pain  [facility] Je Ro MD, Pain Management, [place]       Orders Placed This Encounter   Procedures   Kofi Ghosh MD, Gastroenterology, [place]     Referral Priority:   Routine     Referral Type:   Eval and Treat     Referral Reason:   Specialty Services Required     Referred to Provider:   Gunner Schaeffer MD     Requested Specialty:   Gastroenterology     Number of Visits Requested:   [facility] Je Ro MD, Pain Management, [place]     Referral Priority:   Routine     Referral Type:   Eval and Treat     Referral Reason:   Specialty Services Required     Referred to Provider:   Kalli Lucero MD     Requested Specialty:   Physical Medicine and Rehab     Number of Visits Requested:   1     Requested Prescriptions      No prescriptions requested or ordered in this encounter   Recent ER labs and CT report reviewed looks ok. Phenergan liquid prn for nausea. GI and pain mange  referral    No follow-ups on file.     Electronically signed by Billy Chavez MD

## 2021-03-12 ENCOUNTER — TELEPHONE (OUTPATIENT)
Dept: FAMILY MEDICINE CLINIC | Age: 31
End: 2021-03-12

## 2021-03-12 DIAGNOSIS — R11.0 NAUSEA: Primary | ICD-10-CM

## 2021-03-12 NOTE — TELEPHONE ENCOUNTER
Pt calling stating he was seen and given Phenergan but he is running out, can you call in more, pt uses [facility], please advise at above number.

## 2021-03-17 RX ORDER — PROMETHAZINE HYDROCHLORIDE 6.25 MG/5ML
25 SYRUP ORAL 4 TIMES DAILY PRN
Qty: 300 ML | Refills: 0 | Status: SHIPPED | OUTPATIENT
Start: 2021-03-17 | End: 2021-03-25 | Stop reason: SDUPTHER

## 2021-03-25 DIAGNOSIS — R11.0 NAUSEA: ICD-10-CM

## 2021-03-25 RX ORDER — PROMETHAZINE HYDROCHLORIDE 6.25 MG/5ML
25 SYRUP ORAL 4 TIMES DAILY PRN
Qty: 300 ML | Refills: 0 | Status: CANCELLED | OUTPATIENT
Start: 2021-03-25

## 2021-03-25 RX ORDER — PROMETHAZINE HYDROCHLORIDE 6.25 MG/5ML
25 SYRUP ORAL 4 TIMES DAILY PRN
Qty: 300 ML | Refills: 0 | Status: SHIPPED | OUTPATIENT
Start: 2021-03-25 | End: 2021-04-12 | Stop reason: SDUPTHER

## 2021-04-07 DIAGNOSIS — R11.0 NAUSEA: ICD-10-CM

## 2021-04-07 NOTE — TELEPHONE ENCOUNTER
Uvaldo Berkowitz called requesting a refill of the below medication which has been pended for you:     Requested Prescriptions     Pending Prescriptions Disp Refills    promethazine (PHENERGAN) 6.25 MG/5ML syrup 300 mL 0     Sig: Take 20 mLs by mouth 4 times daily as needed for Nausea       Last Appointment Date: 3/9/2021  Next Appointment Date: Visit date not found    No Known Allergies

## 2021-04-09 NOTE — TELEPHONE ENCOUNTER
Lauren Fabian called requesting a refill of the below medication which has been pended for you:     Requested Prescriptions     Pending Prescriptions Disp Refills    promethazine (PHENERGAN) 6.25 MG/5ML syrup 300 mL 0     Sig: Take 20 mLs by mouth 4 times daily as needed for Nausea       Last Appointment Date: 3/9/2021  Next Appointment Date: Visit date not found    No Known Allergies

## 2021-04-12 RX ORDER — PROMETHAZINE HYDROCHLORIDE 6.25 MG/5ML
25 SYRUP ORAL 4 TIMES DAILY PRN
Qty: 300 ML | Refills: 0 | Status: SHIPPED | OUTPATIENT
Start: 2021-04-12 | End: 2021-04-17 | Stop reason: SDUPTHER

## 2021-04-17 ENCOUNTER — HOSPITAL ENCOUNTER (EMERGENCY)
Age: 31
Discharge: HOME | End: 2021-04-17
Attending: EMERGENCY MEDICINE
Payer: MEDICAID

## 2021-04-17 VITALS
OXYGEN SATURATION: 96 % | SYSTOLIC BLOOD PRESSURE: 149 MMHG | BODY MASS INDEX: 35.21 KG/M2 | HEART RATE: 100 BPM | WEIGHT: 260 LBS | HEIGHT: 72 IN | DIASTOLIC BLOOD PRESSURE: 104 MMHG | TEMPERATURE: 97.2 F | RESPIRATION RATE: 16 BRPM

## 2021-04-17 DIAGNOSIS — R11.0 NAUSEA: ICD-10-CM

## 2021-04-17 DIAGNOSIS — K04.7 DENTAL INFECTION: Primary | ICD-10-CM

## 2021-04-17 PROCEDURE — 64400 NJX AA&/STRD TRIGEMINAL NRV: CPT

## 2021-04-17 PROCEDURE — 99285 EMERGENCY DEPT VISIT HI MDM: CPT

## 2021-04-17 PROCEDURE — 6370000000 HC RX 637 (ALT 250 FOR IP): Performed by: EMERGENCY MEDICINE

## 2021-04-17 PROCEDURE — 64450 NJX AA&/STRD OTHER PN/BRANCH: CPT

## 2021-04-17 RX ORDER — PROMETHAZINE HYDROCHLORIDE 6.25 MG/5ML
25 SYRUP ORAL 4 TIMES DAILY PRN
Qty: 300 ML | Refills: 0 | Status: SHIPPED | OUTPATIENT
Start: 2021-04-17 | End: 2021-04-23 | Stop reason: SDUPTHER

## 2021-04-17 RX ORDER — PENICILLIN V POTASSIUM 500 MG/1
500 TABLET ORAL 4 TIMES DAILY
Qty: 28 TABLET | Refills: 0 | Status: SHIPPED | OUTPATIENT
Start: 2021-04-17 | End: 2021-04-24

## 2021-04-17 RX ORDER — PENICILLIN V POTASSIUM 250 MG/1
500 TABLET ORAL ONCE
Status: COMPLETED | OUTPATIENT
Start: 2021-04-17 | End: 2021-04-17

## 2021-04-17 RX ADMIN — PENICILLIN V POTASSIUM 500 MG: 250 TABLET, FILM COATED ORAL at 06:45

## 2021-04-17 ASSESSMENT — PAIN DESCRIPTION - LOCATION: LOCATION: MOUTH

## 2021-04-17 ASSESSMENT — PAIN DESCRIPTION - PAIN TYPE: TYPE: ACUTE PAIN

## 2021-04-17 ASSESSMENT — PAIN DESCRIPTION - DESCRIPTORS: DESCRIPTORS: ACHING

## 2021-04-17 ASSESSMENT — PAIN DESCRIPTION - ORIENTATION: ORIENTATION: RIGHT

## 2021-04-17 NOTE — ED TRIAGE NOTES
Patient presents to the ED with complaints of right sided dental pain. He states he was having issues two weeks ago but it went away. He states the pain came back last night and was worse. He took tylenol around 1800 last evening. No further questions or concerns at this time. Call light within reach.

## 2021-04-22 DIAGNOSIS — R11.0 NAUSEA: ICD-10-CM

## 2021-04-23 RX ORDER — PROMETHAZINE HYDROCHLORIDE 6.25 MG/5ML
25 SYRUP ORAL 4 TIMES DAILY PRN
Qty: 300 ML | Refills: 0 | Status: SHIPPED | OUTPATIENT
Start: 2021-04-23 | End: 2021-05-13 | Stop reason: SDUPTHER

## 2021-05-12 DIAGNOSIS — R11.0 NAUSEA: ICD-10-CM

## 2021-05-13 RX ORDER — PROMETHAZINE HYDROCHLORIDE 6.25 MG/5ML
25 SYRUP ORAL 4 TIMES DAILY PRN
Qty: 300 ML | Refills: 0 | Status: SHIPPED | OUTPATIENT
Start: 2021-05-13 | End: 2021-06-04 | Stop reason: SDUPTHER

## 2021-05-18 DIAGNOSIS — R11.0 NAUSEA: ICD-10-CM

## 2021-05-18 NOTE — TELEPHONE ENCOUNTER
Pt states every time we fill this for him it's only for 4 days, can you send for 1 month, pt uses [facility] pharmacy.

## 2021-05-18 NOTE — TELEPHONE ENCOUNTER
Macie Bell called requesting a refill of the below medication which has been pended for you:     Patient does not see gastro for his chrohn's disease.  Should we send to gastro for revalautaion of his abd pain and nausea since he is requesting so much phenergan    Requested Prescriptions     Pending Prescriptions Disp Refills    promethazine (PHENERGAN) 6.25 MG/5ML syrup 300 mL 0     Sig: Take 20 mLs by mouth 4 times daily as needed for Nausea       Last Appointment Date: 3/9/2021  Next Appointment Date: Visit date not found    No Known Allergies

## 2021-05-19 DIAGNOSIS — R11.0 NAUSEA: ICD-10-CM

## 2021-05-19 DIAGNOSIS — K50.019 CROHN'S DISEASE OF SMALL INTESTINE WITH COMPLICATION (HCC): Primary | ICD-10-CM

## 2021-05-19 DIAGNOSIS — R10.9 CHRONIC ABDOMINAL PAIN: ICD-10-CM

## 2021-05-19 DIAGNOSIS — G89.29 CHRONIC ABDOMINAL PAIN: ICD-10-CM

## 2021-05-19 RX ORDER — PROMETHAZINE HYDROCHLORIDE 6.25 MG/5ML
25 SYRUP ORAL 4 TIMES DAILY PRN
Qty: 300 ML | Refills: 0 | OUTPATIENT
Start: 2021-05-19

## 2021-05-19 NOTE — TELEPHONE ENCOUNTER
Writer called patient to let him know that we can not send in for a whole 30 day supply for him. Writer also advised patient that Dr. Cindy Smiley is recommending he see gastroenterology for his crohn's disease and his continuous abdominal pain.  Patient is in agreement to this and all information and referral was sent to [facility] in [place]

## 2021-06-03 DIAGNOSIS — R11.0 NAUSEA: ICD-10-CM

## 2021-06-03 NOTE — TELEPHONE ENCOUNTER
Pt calling for a refill, questioned status of GI appt, pt states he did not know status of appt, pt given number for Gastro and told to call.

## 2021-06-04 RX ORDER — PROMETHAZINE HYDROCHLORIDE 6.25 MG/5ML
25 SYRUP ORAL 4 TIMES DAILY PRN
Qty: 300 ML | Refills: 0 | Status: SHIPPED | OUTPATIENT
Start: 2021-06-04 | End: 2021-06-18 | Stop reason: SDUPTHER

## 2021-06-17 DIAGNOSIS — R11.0 NAUSEA: ICD-10-CM

## 2021-06-18 RX ORDER — PROMETHAZINE HYDROCHLORIDE 6.25 MG/5ML
25 SYRUP ORAL 4 TIMES DAILY PRN
Qty: 300 ML | Refills: 0 | Status: SHIPPED | OUTPATIENT
Start: 2021-06-18 | End: 2021-06-30 | Stop reason: SDUPTHER

## 2021-06-22 ENCOUNTER — HOSPITAL ENCOUNTER (OUTPATIENT)
Age: 31
Setting detail: SPECIMEN
Discharge: HOME | End: 2021-06-22
Payer: MEDICAID

## 2021-06-22 ENCOUNTER — OFFICE VISIT (OUTPATIENT)
Dept: PRIMARY CARE CLINIC | Age: 31
End: 2021-06-22
Payer: MEDICAID

## 2021-06-22 VITALS
BODY MASS INDEX: 31.29 KG/M2 | WEIGHT: 265 LBS | TEMPERATURE: 98.8 F | OXYGEN SATURATION: 98 % | HEART RATE: 82 BPM | SYSTOLIC BLOOD PRESSURE: 138 MMHG | DIASTOLIC BLOOD PRESSURE: 88 MMHG | HEIGHT: 77 IN

## 2021-06-22 DIAGNOSIS — R05.9 COUGH: ICD-10-CM

## 2021-06-22 DIAGNOSIS — J02.9 PHARYNGITIS, UNSPECIFIED ETIOLOGY: ICD-10-CM

## 2021-06-22 DIAGNOSIS — J06.9 UPPER RESPIRATORY TRACT INFECTION, UNSPECIFIED TYPE: Primary | ICD-10-CM

## 2021-06-22 LAB — S PYO AG THROAT QL: NORMAL

## 2021-06-22 PROCEDURE — 4004F PT TOBACCO SCREEN RCVD TLK: CPT | Performed by: NURSE PRACTITIONER

## 2021-06-22 PROCEDURE — G8427 DOCREV CUR MEDS BY ELIG CLIN: HCPCS | Performed by: NURSE PRACTITIONER

## 2021-06-22 PROCEDURE — G8417 CALC BMI ABV UP PARAM F/U: HCPCS | Performed by: NURSE PRACTITIONER

## 2021-06-22 PROCEDURE — 87880 STREP A ASSAY W/OPTIC: CPT | Performed by: NURSE PRACTITIONER

## 2021-06-22 PROCEDURE — 99213 OFFICE O/P EST LOW 20 MIN: CPT | Performed by: NURSE PRACTITIONER

## 2021-06-22 PROCEDURE — 99213 OFFICE O/P EST LOW 20 MIN: CPT

## 2021-06-22 PROCEDURE — 87651 STREP A DNA AMP PROBE: CPT

## 2021-06-22 RX ORDER — BENZONATATE 100 MG/1
100 CAPSULE ORAL 3 TIMES DAILY PRN
Qty: 30 CAPSULE | Refills: 0 | Status: SHIPPED | OUTPATIENT
Start: 2021-06-22 | End: 2021-07-02

## 2021-06-22 ASSESSMENT — ENCOUNTER SYMPTOMS
WHEEZING: 0
ABDOMINAL PAIN: 0
NAUSEA: 0
COUGH: 1
RHINORRHEA: 1
SHORTNESS OF BREATH: 0
CHEST TIGHTNESS: 0
VOMITING: 0
GASTROINTESTINAL NEGATIVE: 1
SORE THROAT: 1

## 2021-06-22 NOTE — PATIENT INSTRUCTIONS
Patient Education        Viral Respiratory Infection: Care Instructions  Your Care Instructions     Viruses are very small organisms. They grow in number after they enter your body. There are many types that cause different illnesses, such as colds and the mumps. The symptoms of a viral respiratory infection often start quickly. They include a fever, sore throat, and runny nose. You may also just not feel well. Or you may not want to eat much. Most viral respiratory infections are not serious. They usually get better with time and self-care. Antibiotics are not used to treat a viral infection. That's because antibiotics will not help cure a viral illness. In some cases, antiviral medicine can help your body fight a serious viral infection. Follow-up care is a key part of your treatment and safety. Be sure to make and go to all appointments, and call your doctor if you are having problems. It's also a good idea to know your test results and keep a list of the medicines you take. How can you care for yourself at home? · Rest as much as possible until you feel better. · Be safe with medicines. Take your medicine exactly as prescribed. Call your doctor if you think you are having a problem with your medicine. You will get more details on the specific medicine your doctor prescribes. · Take an over-the-counter pain medicine, such as acetaminophen (Tylenol), ibuprofen (Advil, Motrin), or naproxen (Aleve), as needed for pain and fever. Read and follow all instructions on the label. Do not give aspirin to anyone younger than 20. It has been linked to Reye syndrome, a serious illness. · Drink plenty of fluids. Hot fluids, such as tea or soup, may help relieve congestion in your nose and throat. If you have kidney, heart, or liver disease and have to limit fluids, talk with your doctor before you increase the amount of fluids you drink.   · Try to clear mucus from your lungs by breathing deeply and coughing. · Gargle with warm salt water once an hour. This can help reduce swelling and throat pain. Use 1 teaspoon of salt mixed in 1 cup of warm water. · Do not smoke or allow others to smoke around you. If you need help quitting, talk to your doctor about stop-smoking programs and medicines. These can increase your chances of quitting for good. To avoid spreading the virus  · Cough or sneeze into a tissue. Then throw the tissue away. · If you don't have a tissue, use your hand to cover your cough or sneeze. Then clean your hand. You can also cough into your sleeve. · Wash your hands often. Use soap and warm water. Wash for 15 to 20 seconds each time. · If you don't have soap and water near you, you can clean your hands with alcohol wipes or gel. When should you call for help? Call your doctor now or seek immediate medical care if:    · You have a new or higher fever.     · Your fever lasts more than 48 hours.     · You have trouble breathing.     · You have a fever with a stiff neck or a severe headache.     · You are sensitive to light.     · You feel very sleepy or confused. Watch closely for changes in your health, and be sure to contact your doctor if:    · You do not get better as expected. Where can you learn more? Go to https://Privacy NetworkspeTagSeats.Within3. org and sign in to your [employer] account. Enter I550 in the [facility] box to learn more about \"Viral Respiratory Infection: Care Instructions. \"     If you do not have an account, please click on the \"Sign Up Now\" link. Current as of: October 26, 2020               Content Version: 12.9  © 2006-2021 [employer]. Care instructions adapted under license by [facility] ([facility]). If you have questions about a medical condition or this instruction, always ask your healthcare professional. [address]  any warranty or liability for your use of this information.

## 2021-06-22 NOTE — PROGRESS NOTES
[facility]             [address], [address]                        Telephone [phone]             Fax [phone]     Soto Uribe  1990  MYP:[id number]   Date of visit:  6/22/2021    Subjective:    Soto Uribe is a 35 y.o.  male who presents to [facility] today (6/22/2021) for evaluation of:    Chief Complaint   Patient presents with    Pharyngitis     with dry cough, runny nose, nausea. Onset; 2 days       Pharyngitis  This is a new problem. The current episode started in the past 7 days (X 2 days). The problem occurs constantly. The problem has been gradually worsening. Associated symptoms include congestion, coughing, myalgias and a sore throat. Pertinent negatives include no abdominal pain, chest pain, chills, fatigue, fever, headaches, nausea, rash or vomiting. Associated symptoms comments: Rhinorrhea; postnasal drainage. The symptoms are aggravated by swallowing and coughing. Treatments tried: theraflu. The treatment provided no relief. He has the following problem list:  There is no problem list on file for this patient. Current medications are:  Current Outpatient Medications   Medication Sig Dispense Refill    benzonatate (TESSALON) 100 MG capsule Take 1 capsule by mouth 3 times daily as needed for Cough 30 capsule 0    promethazine (PHENERGAN) 6.25 MG/5ML syrup Take 20 mLs by mouth 4 times daily as needed for Nausea 300 mL 0    dicyclomine (BENTYL) 10 MG capsule Take 1 capsule by mouth every 6 hours as needed (cramps) 20 capsule 0    ondansetron (ZOFRAN ODT) 4 MG disintegrating tablet Take 1 tablet by mouth every 8 hours as needed for Nausea (Patient not taking: Reported on 6/22/2021) 20 tablet 0     No current facility-administered medications for this visit. He has No Known Allergies. En Brooks He  reports that he has been smoking cigarettes.  He has been smoking about 0.50 packs per day. He has never used smokeless tobacco.      Objective:    Vitals:    06/22/21 1443   BP: 138/88   Site: Right Upper Arm   Position: Sitting   Pulse: 82   Temp: 98.8 °F (37.1 °C)   TempSrc: Temporal   SpO2: 98%   Weight: 265 lb (120.2 kg)   Height: 6' 5\" (1.956 m)     Body mass index is 31.42 kg/m². Review of Systems   Constitutional: Positive for appetite change. Negative for chills, fatigue and fever. HENT: Positive for congestion, postnasal drip, rhinorrhea and sore throat. Respiratory: Positive for cough. Negative for chest tightness, shortness of breath and wheezing. Cardiovascular: Negative. Negative for chest pain. Gastrointestinal: Negative. Negative for abdominal pain, nausea and vomiting. Musculoskeletal: Positive for myalgias. Skin: Negative for rash. Neurological: Negative for headaches. Physical Exam  Vitals and nursing note reviewed. Constitutional:       Appearance: He is well-developed. HENT:      Head: Normocephalic. Jaw: There is normal jaw occlusion. Right Ear: Tympanic membrane, ear canal and external ear normal.      Left Ear: Tympanic membrane, ear canal and external ear normal.      Nose: Congestion present. Right Turbinates: Swollen (erythema). Left Turbinates: Swollen (erythema). Right Sinus: No maxillary sinus tenderness or frontal sinus tenderness. Left Sinus: No maxillary sinus tenderness or frontal sinus tenderness. Mouth/Throat:      Lips: Pink. Mouth: Mucous membranes are moist.      Pharynx: Uvula midline. Pharyngeal swelling and posterior oropharyngeal erythema present. Tonsils: 1+ on the right. 1+ on the left. Eyes:      Pupils: Pupils are equal, round, and reactive to light. Cardiovascular:      Rate and Rhythm: Normal rate and regular rhythm. Heart sounds: Normal heart sounds.    Pulmonary:      Effort: Pulmonary effort is normal.      Breath sounds: Normal breath sounds and air entry. Comments: Dry cough noted  Musculoskeletal:      Cervical back: Normal range of motion and neck supple. Lymphadenopathy:      Head:      Right side of head: Tonsillar adenopathy present. Left side of head: Tonsillar adenopathy present. Skin:     General: Skin is warm and dry. Neurological:      General: No focal deficit present. Mental Status: He is alert and oriented to person, place, and time. Psychiatric:         Behavior: Behavior normal.         Thought Content: Thought content normal.       Assessment and Plan:    Results for POC orders placed in visit on 06/22/21   POCT rapid strep A   Result Value Ref Range    Strep A Ag None Detected None Detected        Diagnosis Orders   1. Upper respiratory tract infection, unspecified type     2. Pharyngitis, unspecified etiology  POCT rapid strep A    Strep A DNA probe, amplification   3. Cough  benzonatate (TESSALON) 100 MG capsule       We discussed symptoms of Covid-19 and testing. Patient declined testing at this time. I recommended that he use mucinex to help with congestion. Take Tessalon as directed. I also recommended Flonase and an antihistamine for sinus symptoms. he was also encouraged to use tylenol or ibuprofen for pain/fever. Warm salt water gargles and Chloraseptic spray as needed. Increase water intake. Use cool mist humidifier at bedtime. Use nasal saline flush as needed. Good hand hygiene. he was instructed to return if there is no improvement or symptoms worsen. We will call with strep throat culture result. The use, risks, benefits, and side effects of prescribed or recommended medications were discussed. All questions were answered and the patient/caregiver voiced understanding. No orders of the defined types were placed in this encounter.         Electronically signed by ALIDA Hernandez CNP on 6/22/21 at 2:58 PM EDT

## 2021-06-23 LAB
DIRECT EXAM: NORMAL
Lab: NORMAL
SPECIMEN DESCRIPTION: NORMAL

## 2021-06-29 DIAGNOSIS — R11.0 NAUSEA: ICD-10-CM

## 2021-06-30 RX ORDER — PROMETHAZINE HYDROCHLORIDE 6.25 MG/5ML
25 SYRUP ORAL 4 TIMES DAILY PRN
Qty: 300 ML | Refills: 1 | Status: SHIPPED | OUTPATIENT
Start: 2021-06-30 | End: 2021-07-14 | Stop reason: SDUPTHER

## 2021-06-30 NOTE — TELEPHONE ENCOUNTER
Writer contacted patient and advised him that Dr. Naomy Morse will give him one more refill and that the gastroenterologist will be calling him today to schedule an appt and that he will have to discuss his medications with them and further refills at his appt.  Patient verbalized understanding

## 2021-07-14 ENCOUNTER — OFFICE VISIT (OUTPATIENT)
Dept: GASTROENTEROLOGY | Age: 31
End: 2021-07-14
Payer: MEDICAID

## 2021-07-14 VITALS — WEIGHT: 253 LBS | SYSTOLIC BLOOD PRESSURE: 150 MMHG | BODY MASS INDEX: 30 KG/M2 | DIASTOLIC BLOOD PRESSURE: 104 MMHG

## 2021-07-14 DIAGNOSIS — R10.30 LOWER ABDOMINAL PAIN: ICD-10-CM

## 2021-07-14 DIAGNOSIS — K21.9 GASTROESOPHAGEAL REFLUX DISEASE, UNSPECIFIED WHETHER ESOPHAGITIS PRESENT: ICD-10-CM

## 2021-07-14 DIAGNOSIS — K62.5 RECTAL BLEEDING: ICD-10-CM

## 2021-07-14 DIAGNOSIS — K50.919 CROHN'S DISEASE WITH COMPLICATION, UNSPECIFIED GASTROINTESTINAL TRACT LOCATION (HCC): Primary | ICD-10-CM

## 2021-07-14 DIAGNOSIS — R11.0 NAUSEA: ICD-10-CM

## 2021-07-14 PROCEDURE — 4004F PT TOBACCO SCREEN RCVD TLK: CPT | Performed by: INTERNAL MEDICINE

## 2021-07-14 PROCEDURE — 99203 OFFICE O/P NEW LOW 30 MIN: CPT | Performed by: INTERNAL MEDICINE

## 2021-07-14 PROCEDURE — G8427 DOCREV CUR MEDS BY ELIG CLIN: HCPCS | Performed by: INTERNAL MEDICINE

## 2021-07-14 PROCEDURE — G8417 CALC BMI ABV UP PARAM F/U: HCPCS | Performed by: INTERNAL MEDICINE

## 2021-07-14 RX ORDER — PROMETHAZINE HYDROCHLORIDE 6.25 MG/5ML
25 SYRUP ORAL 4 TIMES DAILY PRN
Qty: 300 ML | Refills: 1 | Status: SHIPPED | OUTPATIENT
Start: 2021-07-14 | End: 2021-07-26 | Stop reason: SDUPTHER

## 2021-07-14 RX ORDER — OMEPRAZOLE 40 MG/1
40 CAPSULE, DELAYED RELEASE ORAL
Qty: 30 CAPSULE | Refills: 2 | Status: SHIPPED | OUTPATIENT
Start: 2021-07-14 | End: 2021-12-08 | Stop reason: SDUPTHER

## 2021-07-14 ASSESSMENT — ENCOUNTER SYMPTOMS
DIARRHEA: 1
ALLERGIC/IMMUNOLOGIC NEGATIVE: 1
ANAL BLEEDING: 1
NAUSEA: 1
ABDOMINAL DISTENTION: 1
EYES NEGATIVE: 1
RESPIRATORY NEGATIVE: 1
ABDOMINAL PAIN: 1

## 2021-07-14 NOTE — PROGRESS NOTES
bowel surgeries. Unable to differentiate between small bowel and large bowel disease. He was subsequently placed on Humira 5 years ago which resulted in significant adverse effects including polyarthralgias, chronic fatigue and incomplete transition to remission. 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 He was unable to describe if he had incision and drainage versus seton tube placement versus endoscopic treatment. Denies any extraintestinal manifestations, no rashes, uveitis, erythema nodosum etc. aside from atypical polyarthralgias. Currently currently the patient reports intermittent abdominal discomfort with infrequent bowel movements. Nonbloody in nature. Remicade-- ineffective, ongoing treatment for 1.5 yrs, 6 yrs ago. Humira- Failed, joint pain, adverse effects, sleep changes. 5 yrs ago. Smoker: Active PLGUSU--6/3 ppd x 5 yrs  Illicit drugs: None   Drinking history: Socially   Abdominal surgeries: Small intestine resection-- 2011. 2015--fistula/rectal surgery, SBR--performed in [address] [place] [address]  Prior Colonoscopy: 3 yrs--- normal    Prior EGD: never had one per patient  FH of GI issues: Aunt- Crohn's disease, Cousin-CD      Past Medical,Family, and Social History reviewed and does contribute to the patient presentingcondition. Patient's PMH/PSH,SH,PSYCH Hx, MEDs, ALLERGIES, and ROS were all reviewed and updated in the appropriate sections.     PAST MEDICAL HISTORY:  Past Medical History:   Diagnosis Date    Crohn's colitis [facility])        Past Surgical History:   Procedure Laterality Date    COLON SURGERY         CURRENT MEDICATIONS:    Current Outpatient Medications:     promethazine (PHENERGAN) 6.25 MG/5ML syrup, Take 20 mLs by mouth 4 times daily as needed for Nausea, Disp: 300 mL, Rfl: 1    dicyclomine (BENTYL) 10 MG capsule, Take 1 capsule by mouth every 6 hours as needed (cramps), Disp: 20 capsule, Rfl: 0    ondansetron (ZOFRAN ODT) 4 MG disintegrating tablet, Take 1 tablet by mouth every 8 hours as needed for Nausea (Patient not taking: Reported on 7/14/2021), Disp: 20 tablet, Rfl: 0    ALLERGIES:   No Known Allergies    FAMILY HISTORY:       Problem Relation Age of Onset    Crohn's Disease Maternal Aunt          SOCIAL HISTORY:   Social History     Socioeconomic History    Marital status: Single     Spouse name: Not on file    Number of children: Not on file    Years of education: Not on file    Highest education level: Not on file   Occupational History    Not on file   Tobacco Use    Smoking status: Current Every Day Smoker     Packs/day: 0.50     Types: Cigarettes    Smokeless tobacco: Never Used   Vaping Use    Vaping Use: Never used   Substance and Sexual Activity    Alcohol use: Yes     Comment: socially    Drug use: Not Currently    Sexual activity: Not on file   Other Topics Concern    Not on file   Social History Narrative    Not on file     Social Determinants of Health     Financial Resource Strain:     Difficulty of Paying Living Expenses:    Food Insecurity:     Worried About Running Out of Food in the Last Year:     Ran Out of Food in the Last Year:    Transportation Needs:     Lack of Transportation (Medical):      Lack of Transportation (Non-Medical):    Physical Activity:     Days of Exercise per Week:     Minutes of Exercise per Session:    Stress:     Feeling of Stress :    Social Connections:     Frequency of Communication with Friends and Family:     Frequency of Social Gatherings with Friends and Family:     Attends Gnosticism Services:     Active Member of Clubs or Organizations:     Attends Club or Organization Meetings:     Marital Status:    Intimate Partner Violence:     Fear of Current or Ex-Partner:     Emotionally Abused:     Physically Abused:     Sexually Abused:          REVIEW OF SYSTEMS: A 12-point review of systems was obtained and pertinent positives and negatives were listed below. REVIEW OF SYSTEMS:     Constitutional: No fever, no chills, no lethargy, no weakness. HEENT:  No headache, otalgia, itchy eyes, nasal discharge or sore throat. Cardiac:  No chest pain, dyspnea, orthopnea or PND. Chest:   No cough, phlegm or wheezing. Abdomen:      Detailed by MA   Neuro:  No focal weakness, abnormal movements or seizure like activity. Skin:   No rashes, no itching. :   No hematuria, no pyuria, no dysuria, no flank pain. Extremities:  No swelling or joint pains. ROS was otherwise negative    Review of Systems   Constitutional: Positive for appetite change. HENT: Negative. Eyes: Negative. Respiratory: Negative. Cardiovascular: Negative. Gastrointestinal: Positive for abdominal distention, abdominal pain, anal bleeding, diarrhea and nausea. Endocrine: Negative. Genitourinary: Negative. Musculoskeletal: Negative. Skin: Negative. Allergic/Immunologic: Negative. Neurological: Negative. Hematological: Negative. Psychiatric/Behavioral: Negative. All other systems reviewed and are negative. PHYSICAL EXAMINATION: Vital signs reviewed per the nursing documentation. BP (!) 160/97   Wt 253 lb (114.8 kg)   BMI 30.00 kg/m²   Body mass index is 30 kg/m². Physical Exam    Physical Exam   Constitutional: Patient is oriented to person, place, and time. Patient appears well-developed and well-nourished. HENT:   Head: Normocephalic and atraumatic. Eyes: Pupils are equal, round, and reactive to light. EOM are normal.   Neck: Normal range of motion. Neck supple. No JVD present. No tracheal deviation present. No thyromegaly present. Cardiovascular: Normal rate, regular rhythm, normal heart sounds and intact distal pulses. Pulmonary/Chest: Effort normal and breath sounds normal. No stridor. No respiratory distress. He has no wheezes. He has no rales. He exhibits no tenderness. Abdominal: Soft.  Bowel sounds are normal. He exhibits no distension and no mass. There is no tenderness. There is no rebound and no guarding. No hernia. Musculoskeletal: Normal range of motion. Lymphadenopathy:    Patient has no cervical adenopathy. Neurological: Patient is alert and oriented to person, place, and time. Psychiatric: Patient has a normal mood and affect. Patient behavior is normal.       LABORATORY DATA: Reviewed  Lab Results   Component Value Date    WBC 4.6 03/07/2021    HGB 14.0 03/07/2021    HCT 41.1 03/07/2021    MCV 84.7 03/07/2021     03/07/2021     03/07/2021    K 4.0 03/07/2021     03/07/2021    CO2 25 03/07/2021    BUN 11 03/07/2021    CREATININE 0.92 03/07/2021    LABALBU 4.6 03/07/2021    BILITOT 1.23 (H) 03/07/2021    ALKPHOS 85 03/07/2021    AST 16 03/07/2021    ALT 24 03/07/2021         Lab Results   Component Value Date    RBC 4.85 03/07/2021    HGB 14.0 03/07/2021    MCV 84.7 03/07/2021    MCH 28.9 03/07/2021    MCHC 34.1 (H) 03/07/2021    RDW 12.9 03/07/2021    MPV 10.4 03/07/2021    BASOPCT 1 03/07/2021    LYMPHSABS 1.11 03/07/2021    MONOSABS 0.56 03/07/2021    NEUTROABS 2.60 03/07/2021    EOSABS 0.33 03/07/2021    BASOSABS 0.03 03/07/2021         DIAGNOSTIC TESTING:     No results found. IMPRESSION: : Celina Golden is a 35 y.o. male with a past history remarkable for  Crohns disease, referred for evaluation of continuity of care. Patient has not seen gastroenterology for approximately 2 years. Was initially diagnosed with Crohn's disease approximately 12 years ago. Initially on Remicade which was an effective treatment, treatment duration of approximately 1 and half years 6 years ago which resulted and presumably small bowel obstruction and small bowel resection. Patient appears to be a poor historian is unclear as to the extent of his previous small bowel surgeries. Unable to differentiate between small bowel and large bowel disease.   He was subsequently placed on Humira 5 years ago which resulted in significant adverse effects including polyarthralgias, chronic fatigue and incomplete transition to remission. 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 He was unable to describe if he had incision and drainage versus seton tube placement versus endoscopic treatment. Denies any extraintestinal manifestations, no rashes, uveitis, erythema nodosum etc. aside from atypical polyarthralgias. Currently currently the patient reports intermittent abdominal discomfort with infrequent bowel movements. Nonbloody in nature. Patient has not been on medication for the last 2 years      Assessment  1. Crohn's disease with complication, unspecified gastrointestinal tract location ([facility] [address]    2. Lower abdominal pain    3. Nausea    4. Rectal bleeding        PLAN:    1) Crohns disease--- phenotype needs to be better understood, will need to obtain collateral information and outside records. Possible stricturing and fistulizing disease. Will need to obtain updated blood work last seen GI doctor 2 years ago in [place]. Bentyl and Phenergan for cramping symptoms to hold patient symptoms. 2) Poor appetite per patient. Bowel movements frequent, 4-5 x per day. 3) Hepatitis serologies, HIV, TB, inflammatory markers which include, ESR, CRP, fecal calprotectin. Will need to obtain a CT enterography. Tentative plan for EGD and colonoscopy to evaluate disease activity once initial work-up has been completed. Patient may need a bridge to biologic therapy (prednisone) once initial work-up was obtained. Follow-up closely. Thank you for allowing me to participate in the care of Mr. Wan Soto. For any further questions please do not hesitate to contact me. I have reviewed and agree with the MA/LPN ROS please refer to their documentation from today's encounter on a separate note.      Rhoda Apley MD, MPH   [facility]  Office #: [phone]          this note is created with the assistance of a speech recognition program.  While intending to generate a document that actually reflects the content of the visit, the document can still have some errors including those of syntax and sound a like substitutions which may escape proof reading. It such instances, actual meaning can be extrapolated by contextual diversion.

## 2021-07-15 ENCOUNTER — HOSPITAL ENCOUNTER (EMERGENCY)
Age: 31
Discharge: HOME | End: 2021-07-15
Attending: EMERGENCY MEDICINE
Payer: MEDICAID

## 2021-07-15 ENCOUNTER — APPOINTMENT (OUTPATIENT)
Dept: GENERAL RADIOLOGY | Age: 31
End: 2021-07-15
Payer: MEDICAID

## 2021-07-15 ENCOUNTER — TELEPHONE (OUTPATIENT)
Dept: GASTROENTEROLOGY | Age: 31
End: 2021-07-15

## 2021-07-15 ENCOUNTER — APPOINTMENT (OUTPATIENT)
Dept: CT IMAGING | Age: 31
End: 2021-07-15
Payer: MEDICAID

## 2021-07-15 VITALS
TEMPERATURE: 98 F | SYSTOLIC BLOOD PRESSURE: 145 MMHG | WEIGHT: 255 LBS | HEART RATE: 61 BPM | OXYGEN SATURATION: 98 % | RESPIRATION RATE: 16 BRPM | DIASTOLIC BLOOD PRESSURE: 102 MMHG | BODY MASS INDEX: 30.11 KG/M2 | HEIGHT: 77 IN

## 2021-07-15 DIAGNOSIS — K50.919 CROHN'S DISEASE WITH COMPLICATION, UNSPECIFIED GASTROINTESTINAL TRACT LOCATION (HCC): Primary | ICD-10-CM

## 2021-07-15 DIAGNOSIS — S46.912A SHOULDER STRAIN, LEFT, INITIAL ENCOUNTER: ICD-10-CM

## 2021-07-15 DIAGNOSIS — R10.84 GENERALIZED ABDOMINAL PAIN: Primary | ICD-10-CM

## 2021-07-15 LAB
ABSOLUTE EOS #: 0.39 K/UL (ref 0–0.44)
ABSOLUTE IMMATURE GRANULOCYTE: <0.03 K/UL (ref 0–0.3)
ABSOLUTE LYMPH #: 2.34 K/UL (ref 1.1–3.7)
ABSOLUTE MONO #: 0.83 K/UL (ref 0.1–1.2)
ALBUMIN SERPL-MCNC: 4.4 G/DL (ref 3.5–5.2)
ALBUMIN/GLOBULIN RATIO: 1.8 (ref 1–2.5)
ALP BLD-CCNC: 99 U/L (ref 40–129)
ALT SERPL-CCNC: 39 U/L (ref 5–41)
ANION GAP SERPL CALCULATED.3IONS-SCNC: 9 MMOL/L (ref 9–17)
AST SERPL-CCNC: 25 U/L
BASOPHILS # BLD: 0 % (ref 0–2)
BASOPHILS ABSOLUTE: <0.03 K/UL (ref 0–0.2)
BILIRUB SERPL-MCNC: 0.93 MG/DL (ref 0.3–1.2)
BUN BLDV-MCNC: 6 MG/DL (ref 6–20)
BUN/CREAT BLD: 6 (ref 9–20)
CALCIUM SERPL-MCNC: 9.1 MG/DL (ref 8.6–10.4)
CHLORIDE BLD-SCNC: 103 MMOL/L (ref 98–107)
CO2: 28 MMOL/L (ref 20–31)
CREAT SERPL-MCNC: 0.97 MG/DL (ref 0.7–1.2)
DIFFERENTIAL TYPE: ABNORMAL
EOSINOPHILS RELATIVE PERCENT: 6 % (ref 1–4)
GFR AFRICAN AMERICAN: >60 ML/MIN
GFR NON-AFRICAN AMERICAN: >60 ML/MIN
GFR SERPL CREATININE-BSD FRML MDRD: ABNORMAL ML/MIN/{1.73_M2}
GFR SERPL CREATININE-BSD FRML MDRD: ABNORMAL ML/MIN/{1.73_M2}
GLUCOSE BLD-MCNC: 95 MG/DL (ref 70–99)
HCT VFR BLD CALC: 38.5 % (ref 40.7–50.3)
HEMOGLOBIN: 13 G/DL (ref 13–17)
IMMATURE GRANULOCYTES: 0 %
LACTIC ACID, SEPSIS WHOLE BLOOD: NORMAL MMOL/L (ref 0.5–1.9)
LACTIC ACID, SEPSIS: 1.2 MMOL/L (ref 0.5–1.9)
LIPASE: 19 U/L (ref 13–60)
LYMPHOCYTES # BLD: 35 % (ref 24–43)
MAGNESIUM: 2.2 MG/DL (ref 1.6–2.6)
MCH RBC QN AUTO: 28.8 PG (ref 25.2–33.5)
MCHC RBC AUTO-ENTMCNC: 33.8 G/DL (ref 25.2–33.5)
MCV RBC AUTO: 85.2 FL (ref 82.6–102.9)
MONOCYTES # BLD: 12 % (ref 3–12)
NRBC AUTOMATED: 0 PER 100 WBC
PDW BLD-RTO: 13.8 % (ref 11.8–14.4)
PLATELET # BLD: 227 K/UL (ref 138–453)
PLATELET ESTIMATE: ABNORMAL
PMV BLD AUTO: 10.9 FL (ref 8.1–13.5)
POTASSIUM SERPL-SCNC: 3.7 MMOL/L (ref 3.7–5.3)
RBC # BLD: 4.52 M/UL (ref 4.21–5.77)
RBC # BLD: ABNORMAL 10*6/UL
SEG NEUTROPHILS: 47 % (ref 36–65)
SEGMENTED NEUTROPHILS ABSOLUTE COUNT: 3.13 K/UL (ref 1.5–8.1)
SODIUM BLD-SCNC: 140 MMOL/L (ref 135–144)
TOTAL PROTEIN: 6.9 G/DL (ref 6.4–8.3)
WBC # BLD: 6.7 K/UL (ref 3.5–11.3)
WBC # BLD: ABNORMAL 10*3/UL

## 2021-07-15 PROCEDURE — 96375 TX/PRO/DX INJ NEW DRUG ADDON: CPT

## 2021-07-15 PROCEDURE — 73030 X-RAY EXAM OF SHOULDER: CPT

## 2021-07-15 PROCEDURE — 99285 EMERGENCY DEPT VISIT HI MDM: CPT

## 2021-07-15 PROCEDURE — 83690 ASSAY OF LIPASE: CPT

## 2021-07-15 PROCEDURE — 85025 COMPLETE CBC W/AUTO DIFF WBC: CPT

## 2021-07-15 PROCEDURE — 6360000002 HC RX W HCPCS: Performed by: EMERGENCY MEDICINE

## 2021-07-15 PROCEDURE — 2709999900 CT ABDOMEN PELVIS W IV CONTRAST

## 2021-07-15 PROCEDURE — 2580000003 HC RX 258: Performed by: EMERGENCY MEDICINE

## 2021-07-15 PROCEDURE — 83735 ASSAY OF MAGNESIUM: CPT

## 2021-07-15 PROCEDURE — 96376 TX/PRO/DX INJ SAME DRUG ADON: CPT

## 2021-07-15 PROCEDURE — 6360000004 HC RX CONTRAST MEDICATION: Performed by: EMERGENCY MEDICINE

## 2021-07-15 PROCEDURE — 83605 ASSAY OF LACTIC ACID: CPT

## 2021-07-15 PROCEDURE — 96374 THER/PROPH/DIAG INJ IV PUSH: CPT

## 2021-07-15 PROCEDURE — 80053 COMPREHEN METABOLIC PANEL: CPT

## 2021-07-15 RX ORDER — HYDROCODONE BITARTRATE AND ACETAMINOPHEN 5; 325 MG/1; MG/1
1 TABLET ORAL EVERY 6 HOURS PRN
Qty: 20 TABLET | Refills: 0 | Status: SHIPPED | OUTPATIENT
Start: 2021-07-15 | End: 2021-07-18

## 2021-07-15 RX ORDER — MORPHINE SULFATE 4 MG/ML
4 INJECTION, SOLUTION INTRAMUSCULAR; INTRAVENOUS ONCE
Status: COMPLETED | OUTPATIENT
Start: 2021-07-15 | End: 2021-07-15

## 2021-07-15 RX ORDER — SODIUM CHLORIDE, SODIUM LACTATE, POTASSIUM CHLORIDE, AND CALCIUM CHLORIDE .6; .31; .03; .02 G/100ML; G/100ML; G/100ML; G/100ML
1000 INJECTION, SOLUTION INTRAVENOUS ONCE
Status: COMPLETED | OUTPATIENT
Start: 2021-07-15 | End: 2021-07-15

## 2021-07-15 RX ORDER — ONDANSETRON 2 MG/ML
4 INJECTION INTRAMUSCULAR; INTRAVENOUS ONCE
Status: COMPLETED | OUTPATIENT
Start: 2021-07-15 | End: 2021-07-15

## 2021-07-15 RX ORDER — 0.9 % SODIUM CHLORIDE 0.9 %
1000 INTRAVENOUS SOLUTION INTRAVENOUS ONCE
Status: COMPLETED | OUTPATIENT
Start: 2021-07-15 | End: 2021-07-15

## 2021-07-15 RX ADMIN — ONDANSETRON 4 MG: 2 INJECTION INTRAMUSCULAR; INTRAVENOUS at 07:39

## 2021-07-15 RX ADMIN — SODIUM CHLORIDE, POTASSIUM CHLORIDE, SODIUM LACTATE AND CALCIUM CHLORIDE 1000 ML: 600; 310; 30; 20 INJECTION, SOLUTION INTRAVENOUS at 09:03

## 2021-07-15 RX ADMIN — MORPHINE SULFATE 4 MG: 4 INJECTION, SOLUTION INTRAMUSCULAR; INTRAVENOUS at 07:39

## 2021-07-15 RX ADMIN — MORPHINE SULFATE 4 MG: 4 INJECTION, SOLUTION INTRAMUSCULAR; INTRAVENOUS at 10:49

## 2021-07-15 RX ADMIN — IOPAMIDOL 100 ML: 755 INJECTION, SOLUTION INTRAVENOUS at 08:11

## 2021-07-15 RX ADMIN — SODIUM CHLORIDE 1000 ML: 9 INJECTION, SOLUTION INTRAVENOUS at 07:34

## 2021-07-15 ASSESSMENT — ENCOUNTER SYMPTOMS
DIARRHEA: 1
NAUSEA: 1
ABDOMINAL PAIN: 1
SHORTNESS OF BREATH: 0
VOMITING: 1
SORE THROAT: 0
BLOOD IN STOOL: 1

## 2021-07-15 ASSESSMENT — PAIN SCALES - GENERAL
PAINLEVEL_OUTOF10: 5
PAINLEVEL_OUTOF10: 9
PAINLEVEL_OUTOF10: 9

## 2021-07-15 ASSESSMENT — PAIN DESCRIPTION - FREQUENCY: FREQUENCY: CONTINUOUS

## 2021-07-15 ASSESSMENT — PAIN DESCRIPTION - PAIN TYPE: TYPE: ACUTE PAIN

## 2021-07-15 ASSESSMENT — PAIN DESCRIPTION - DESCRIPTORS: DESCRIPTORS: ACHING;CRAMPING

## 2021-07-15 ASSESSMENT — PAIN DESCRIPTION - LOCATION: LOCATION: ABDOMEN;SHOULDER;BACK
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
## 2021-07-15 NOTE — TELEPHONE ENCOUNTER
Writer received call from Sandy at [facility] ED. States they are about to discharge patient is wants to know if  would like him to be started on steroids. Spoke with , even though CT was negative, we still need inflammatory markers as well as labs completed to be able to start patient on treatments. Returned call to [facility] [place] [facility]- spoke with ED physician Michoacano Metz, informed him of  orders and to encourage patient to have our labs (including stool lab) completed ASAP so we cant start treatment for him.

## 2021-07-15 NOTE — ED PROVIDER NOTES
[facility] ED  [address]  [place] [address]  Phone: [phone]    Gayla          Pt Name: Eugenie Diaz  MRN: [id number]  [place] 1990  Date of evaluation: 7/15/2021      CHIEF COMPLAINT       Chief Complaint   Patient presents with    Abdominal Pain    Back Pain       HISTORY OF PRESENT ILLNESS       Eugenie Diaz is a 35 y.o. male who presents with generalized abdominal pain for the past 2 and half weeks. Does have a history of Crohn's. Saw his GI doctor yesterday at [facility] who ordered lab testing, CT scan and is scheduled to colonoscopy. Has not yet had any these test done. States the pain is worse today. Has been on immunosuppressive therapy in the past but not currently on anything. His GI doctor discussed starting low-dose prednisone with him but has not yet started that. Does have some blood in the stool. Has had vomiting and diarrhea as well. Denies any fevers. Denies other symptoms at this time. REVIEW OF SYSTEMS       Review of Systems   Constitutional: Negative for chills and fever. HENT: Negative for sore throat. Respiratory: Negative for shortness of breath. Cardiovascular: Negative for chest pain. Gastrointestinal: Positive for abdominal pain, blood in stool, diarrhea, nausea and vomiting. Musculoskeletal: Negative for neck pain. Skin: Negative for rash. Neurological: Negative for weakness and numbness. PAST MEDICAL HISTORY    has a past medical history of Crohn's colitis ([facility] [address] SURGICAL HISTORY      has a past surgical history that includes Colon surgery.     CURRENT MEDICATIONS       Discharge Medication List as of 7/15/2021 10:34 AM      CONTINUE these medications which have NOT CHANGED    Details   omeprazole (PRILOSEC) 40 MG delayed release capsule Take 1 capsule by mouth every morning (before breakfast), Disp-30 capsule, R-2Normal      promethazine (PHENERGAN) 6.25 MG/5ML syrup Take 20 mLs by mouth 4 times daily as needed for Nausea, Disp-300 mL, R-1Normal             ALLERGIES     has No Known Allergies. FAMILY HISTORY     He indicated that the status of his maternal aunt is unknown.     family history includes Crohn's Disease in his maternal aunt. SOCIAL HISTORY      reports that he has been smoking cigarettes. He has been smoking about 0.50 packs per day. He has never used smokeless tobacco. He reports current alcohol use. He reports previous drug use. PHYSICAL EXAM     INITIAL VITALS:  height is 6' 5\" (1.956 m) and weight is 115.7 kg (255 lb). His temperature is 98 °F (36.7 °C). His blood pressure is 145/102 (abnormal) and his pulse is 61. His respiration is 16 and oxygen saturation is 98%. Physical Exam  Constitutional:       General: He is not in acute distress. Appearance: He is well-developed. HENT:      Head: Normocephalic and atraumatic. Right Ear: External ear normal.      Left Ear: External ear normal.   Eyes:      General: Lids are normal.         Right eye: No discharge. Left eye: No discharge. Neck:      Trachea: No tracheal deviation. Cardiovascular:      Rate and Rhythm: Normal rate and regular rhythm. Pulmonary:      Effort: Pulmonary effort is normal.   Abdominal:      Palpations: Abdomen is soft. Tenderness: There is no abdominal tenderness. Comments: Generalized mild tenderness. No peritoneal signs. Skin:     General: Skin is warm and dry. Neurological:      Mental Status: He is alert. GCS: GCS eye subscore is 4. GCS verbal subscore is 5. GCS motor subscore is 6. Psychiatric:         Behavior: Behavior normal.           DIFFERENTIAL DIAGNOSIS/ MDM:     Plan to be treated should a further abdominal/GI work-up. We will order the testing his GI doctor had ordered as well.     DIAGNOSTIC RESULTS     EKG: All EKG's are interpreted by the Emergency Department Physician who either signs or Co-signs this chart in the absence of a cardiologist.        RADIOLOGY:   Interpretation per the Radiologist below, if available at the time of this note:  XR SHOULDER LEFT (MIN 2 VIEWS)   Final Result   Unremarkable left shoulder. CT ABDOMEN PELVIS W IV CONTRAST Additional Contrast? None   Final Result   Negative CT examination of the abdomen and pelvis with no evidence of acute   process. Few incidental and chronic/postsurgical findings as described. No results found.     LABS:  Results for orders placed or performed during the hospital encounter of 07/15/21   CBC Auto Differential   Result Value Ref Range    WBC 6.7 3.5 - 11.3 k/uL    RBC 4.52 4.21 - 5.77 m/uL    Hemoglobin 13.0 13.0 - 17.0 g/dL    Hematocrit 38.5 (L) 40.7 - 50.3 %    MCV 85.2 82.6 - 102.9 fL    MCH 28.8 25.2 - 33.5 pg    MCHC 33.8 (H) 25.2 - 33.5 g/dL    RDW 13.8 11.8 - 14.4 %    Platelets 609 933 - 265 k/uL    MPV 10.9 8.1 - 13.5 fL    NRBC Automated 0.0 0.0 per 100 WBC    Differential Type NOT REPORTED     Seg Neutrophils 47 36 - 65 %    Lymphocytes 35 24 - 43 %    Monocytes 12 3 - 12 %    Eosinophils % 6 (H) 1 - 4 %    Basophils 0 0 - 2 %    Immature Granulocytes 0 0 %    Segs Absolute 3.13 1.50 - 8.10 k/uL    Absolute Lymph # 2.34 1.10 - 3.70 k/uL    Absolute Mono # 0.83 0.10 - 1.20 k/uL    Absolute Eos # 0.39 0.00 - 0.44 k/uL    Basophils Absolute <0.03 0.00 - 0.20 k/uL    Absolute Immature Granulocyte <0.03 0.00 - 0.30 k/uL    WBC Morphology NOT REPORTED     RBC Morphology NOT REPORTED     Platelet Estimate NOT REPORTED    Comprehensive Metabolic Panel   Result Value Ref Range    Glucose 95 70 - 99 mg/dL    BUN 6 6 - 20 mg/dL    CREATININE 0.97 0.70 - 1.20 mg/dL    Bun/Cre Ratio 6 (L) 9 - 20    Calcium 9.1 8.6 - 10.4 mg/dL    Sodium 140 135 - 144 mmol/L    Potassium 3.7 3.7 - 5.3 mmol/L    Chloride 103 98 - 107 mmol/L    CO2 28 20 - 31 mmol/L    Anion Gap 9 9 - 17 mmol/L    Alkaline Phosphatase 99 40 - 129 U/L    ALT 39 5 - 41 U/L    AST 25 <40 U/L    Total Bilirubin 0.93 0.3 - 1.2 mg/dL    Total Protein 6.9 6.4 - 8.3 g/dL    Albumin 4.4 3.5 - 5.2 g/dL    Albumin/Globulin Ratio 1.8 1.0 - 2.5    GFR Non-African American >60 >60 mL/min    GFR African American >60 >60 mL/min    GFR Comment          GFR Staging NOT REPORTED    Lactate, Sepsis   Result Value Ref Range    Lactic Acid, Sepsis 1.2 0.5 - 1.9 mmol/L    Lactic Acid, Sepsis, Whole Blood NOT REPORTED 0.5 - 1.9 mmol/L   Magnesium   Result Value Ref Range    Magnesium 2.2 1.6 - 2.6 mg/dL   Lipase   Result Value Ref Range    Lipase 19 13 - 60 U/L       EMERGENCY DEPARTMENT COURSE:     The patient was given the following medications:  Orders Placed This Encounter   Medications    0.9 % sodium chloride bolus    lactated ringers bolus    ondansetron (ZOFRAN) injection 4 mg    morphine injection 4 mg    iopamidol (ISOVUE-370) 76 % injection 100 mL    morphine injection 4 mg    HYDROcodone-acetaminophen (NORCO) 5-325 MG per tablet     Sig: Take 1 tablet by mouth every 6 hours as needed for Pain for up to 3 days. Dispense:  20 tablet     Refill:  0        Vitals:    Vitals:    07/15/21 0709 07/15/21 1051   BP: (!) 140/96 (!) 145/102   Pulse: 89 61   Resp: (!) 116 16   Temp: 98 °F (36.7 °C)    SpO2: 98% 98%   Weight: 115.7 kg (255 lb)    Height: 6' 5\" (1.956 m)      -------------------------  BP: (!) 145/102, Temp: 98 °F (36.7 °C), Pulse: 61, Resp: 16      Re-evaluation Notes    Signed over to Dr. Winsome Marie at 7:30am.    CONSULTS:    None    CRITICAL CARE:     None    PROCEDURES:    None    FINAL IMPRESSION      1. Generalized abdominal pain    2.  Shoulder strain, left, initial encounter          DISPOSITION/PLAN   DISPOSITION Decision To Discharge 07/15/2021 10:32:36 AM      Condition on Disposition    Improved    PATIENT REFERRED TO:  Roseann Rivers 86  [phone]    In 3 days        DISCHARGE MEDICATIONS:  Discharge Medication List as of 7/15/2021 10:34 AM      START taking these medications    Details   HYDROcodone-acetaminophen (NORCO) 5-325 MG per tablet Take 1 tablet by mouth every 6 hours as needed for Pain for up to 3 days. , Disp-20 tablet, R-0Print             (Please note that portions of this note were completed with a voice recognition program.  Efforts were made to edit the dictations but occasionally words are mis-transcribed.)    Bridgette Nesbitt DO, DO  Attending Emergency Physician       Bridgette Nesbitt DO  07/16/21 [id number]

## 2021-07-15 NOTE — ED TRIAGE NOTES
Pt to er complainng of llq abd pain with hx of crohns. sts increased pain, loose stools and nausea. St sthat he was seen by GI yesterday without relief. sts that he has \" a lot of stuff to get done for them\". Pt sts that he is also having mid lower back pain. Denies urinary.   Pt a/ox3.  nad noted

## 2021-07-26 DIAGNOSIS — R11.0 NAUSEA: ICD-10-CM

## 2021-07-26 RX ORDER — PROMETHAZINE HYDROCHLORIDE 6.25 MG/5ML
25 SYRUP ORAL 4 TIMES DAILY PRN
Qty: 300 ML | Refills: 1 | Status: SHIPPED | OUTPATIENT
Start: 2021-07-26 | End: 2021-08-08 | Stop reason: SDUPTHER

## 2021-07-26 NOTE — TELEPHONE ENCOUNTER
Vitaly Finney called requesting a refill of the below medication which has been pended for you:   [facility], currently prescribed by gastroenterology    Requested Prescriptions     Pending Prescriptions Disp Refills    promethazine (PHENERGAN) 6.25 MG/5ML syrup 300 mL 1     Sig: Take 20 mLs by mouth 4 times daily as needed for Nausea       Last Appointment Date: 7/14/21  Next Appointment Date: Visit date not found    No Known Allergies

## 2021-08-08 ENCOUNTER — HOSPITAL ENCOUNTER (EMERGENCY)
Age: 31
Discharge: HOME | End: 2021-08-08
Attending: EMERGENCY MEDICINE
Payer: MEDICAID

## 2021-08-08 ENCOUNTER — APPOINTMENT (OUTPATIENT)
Dept: GENERAL RADIOLOGY | Age: 31
End: 2021-08-08
Payer: MEDICAID

## 2021-08-08 VITALS
OXYGEN SATURATION: 100 % | BODY MASS INDEX: 30.44 KG/M2 | RESPIRATION RATE: 14 BRPM | HEIGHT: 76 IN | DIASTOLIC BLOOD PRESSURE: 97 MMHG | SYSTOLIC BLOOD PRESSURE: 145 MMHG | TEMPERATURE: 98.4 F | HEART RATE: 60 BPM | WEIGHT: 250 LBS

## 2021-08-08 DIAGNOSIS — R11.0 NAUSEA: ICD-10-CM

## 2021-08-08 DIAGNOSIS — R10.84 GENERALIZED ABDOMINAL PAIN: Primary | ICD-10-CM

## 2021-08-08 LAB
ABSOLUTE EOS #: 0.26 K/UL (ref 0–0.44)
ABSOLUTE IMMATURE GRANULOCYTE: <0.03 K/UL (ref 0–0.3)
ABSOLUTE LYMPH #: 1.76 K/UL (ref 1.1–3.7)
ABSOLUTE MONO #: 0.64 K/UL (ref 0.1–1.2)
ALBUMIN SERPL-MCNC: 4.4 G/DL (ref 3.5–5.2)
ALBUMIN/GLOBULIN RATIO: 1.6 (ref 1–2.5)
ALP BLD-CCNC: 95 U/L (ref 40–129)
ALT SERPL-CCNC: 39 U/L (ref 5–41)
ANION GAP SERPL CALCULATED.3IONS-SCNC: 11 MMOL/L (ref 9–17)
AST SERPL-CCNC: 16 U/L
BASOPHILS # BLD: 0 % (ref 0–2)
BASOPHILS ABSOLUTE: <0.03 K/UL (ref 0–0.2)
BILIRUB SERPL-MCNC: 0.54 MG/DL (ref 0.3–1.2)
BUN BLDV-MCNC: 5 MG/DL (ref 6–20)
BUN/CREAT BLD: 6 (ref 9–20)
CALCIUM SERPL-MCNC: 9.6 MG/DL (ref 8.6–10.4)
CHLORIDE BLD-SCNC: 104 MMOL/L (ref 98–107)
CO2: 25 MMOL/L (ref 20–31)
CREAT SERPL-MCNC: 0.89 MG/DL (ref 0.7–1.2)
DIFFERENTIAL TYPE: ABNORMAL
EOSINOPHILS RELATIVE PERCENT: 4 % (ref 1–4)
GFR AFRICAN AMERICAN: >60 ML/MIN
GFR NON-AFRICAN AMERICAN: >60 ML/MIN
GFR SERPL CREATININE-BSD FRML MDRD: ABNORMAL ML/MIN/{1.73_M2}
GFR SERPL CREATININE-BSD FRML MDRD: ABNORMAL ML/MIN/{1.73_M2}
GLUCOSE BLD-MCNC: 111 MG/DL (ref 70–99)
HCT VFR BLD CALC: 40.1 % (ref 40.7–50.3)
HEMOGLOBIN: 13.5 G/DL (ref 13–17)
IMMATURE GRANULOCYTES: 0 %
LYMPHOCYTES # BLD: 29 % (ref 24–43)
MCH RBC QN AUTO: 28.6 PG (ref 25.2–33.5)
MCHC RBC AUTO-ENTMCNC: 33.7 G/DL (ref 25.2–33.5)
MCV RBC AUTO: 85 FL (ref 82.6–102.9)
MONOCYTES # BLD: 10 % (ref 3–12)
NRBC AUTOMATED: 0 PER 100 WBC
PDW BLD-RTO: 13 % (ref 11.8–14.4)
PLATELET # BLD: 295 K/UL (ref 138–453)
PLATELET ESTIMATE: ABNORMAL
PMV BLD AUTO: 10.5 FL (ref 8.1–13.5)
POTASSIUM SERPL-SCNC: 3.8 MMOL/L (ref 3.7–5.3)
RBC # BLD: 4.72 M/UL (ref 4.21–5.77)
RBC # BLD: ABNORMAL 10*6/UL
SEG NEUTROPHILS: 57 % (ref 36–65)
SEGMENTED NEUTROPHILS ABSOLUTE COUNT: 3.48 K/UL (ref 1.5–8.1)
SODIUM BLD-SCNC: 140 MMOL/L (ref 135–144)
TOTAL PROTEIN: 7.1 G/DL (ref 6.4–8.3)
WBC # BLD: 6.2 K/UL (ref 3.5–11.3)
WBC # BLD: ABNORMAL 10*3/UL

## 2021-08-08 PROCEDURE — 6360000002 HC RX W HCPCS: Performed by: EMERGENCY MEDICINE

## 2021-08-08 PROCEDURE — 96375 TX/PRO/DX INJ NEW DRUG ADDON: CPT

## 2021-08-08 PROCEDURE — 74022 RADEX COMPL AQT ABD SERIES: CPT

## 2021-08-08 PROCEDURE — 2580000003 HC RX 258: Performed by: EMERGENCY MEDICINE

## 2021-08-08 PROCEDURE — 85025 COMPLETE CBC W/AUTO DIFF WBC: CPT

## 2021-08-08 PROCEDURE — 96374 THER/PROPH/DIAG INJ IV PUSH: CPT

## 2021-08-08 PROCEDURE — 80053 COMPREHEN METABOLIC PANEL: CPT

## 2021-08-08 PROCEDURE — 99284 EMERGENCY DEPT VISIT MOD MDM: CPT

## 2021-08-08 RX ORDER — PROMETHAZINE HYDROCHLORIDE 6.25 MG/5ML
25 SYRUP ORAL 4 TIMES DAILY PRN
Qty: 300 ML | Refills: 0 | Status: SHIPPED | OUTPATIENT
Start: 2021-08-08 | End: 2021-08-17 | Stop reason: SDUPTHER

## 2021-08-08 RX ORDER — 0.9 % SODIUM CHLORIDE 0.9 %
1000 INTRAVENOUS SOLUTION INTRAVENOUS ONCE
Status: COMPLETED | OUTPATIENT
Start: 2021-08-08 | End: 2021-08-08

## 2021-08-08 RX ORDER — ONDANSETRON 2 MG/ML
4 INJECTION INTRAMUSCULAR; INTRAVENOUS ONCE
Status: COMPLETED | OUTPATIENT
Start: 2021-08-08 | End: 2021-08-08

## 2021-08-08 RX ORDER — SODIUM CHLORIDE, SODIUM LACTATE, POTASSIUM CHLORIDE, AND CALCIUM CHLORIDE .6; .31; .03; .02 G/100ML; G/100ML; G/100ML; G/100ML
1000 INJECTION, SOLUTION INTRAVENOUS ONCE
Status: COMPLETED | OUTPATIENT
Start: 2021-08-08 | End: 2021-08-08

## 2021-08-08 RX ORDER — FENTANYL CITRATE 50 UG/ML
25 INJECTION, SOLUTION INTRAMUSCULAR; INTRAVENOUS ONCE
Status: COMPLETED | OUTPATIENT
Start: 2021-08-08 | End: 2021-08-08

## 2021-08-08 RX ADMIN — SODIUM CHLORIDE 1000 ML: 9 INJECTION, SOLUTION INTRAVENOUS at 13:20

## 2021-08-08 RX ADMIN — FENTANYL CITRATE 25 MCG: 50 INJECTION, SOLUTION INTRAMUSCULAR; INTRAVENOUS at 13:23

## 2021-08-08 RX ADMIN — SODIUM CHLORIDE, POTASSIUM CHLORIDE, SODIUM LACTATE AND CALCIUM CHLORIDE 1000 ML: 600; 310; 30; 20 INJECTION, SOLUTION INTRAVENOUS at 14:02

## 2021-08-08 RX ADMIN — ONDANSETRON 4 MG: 2 INJECTION INTRAMUSCULAR; INTRAVENOUS at 13:23

## 2021-08-08 ASSESSMENT — PAIN SCALES - GENERAL
PAINLEVEL_OUTOF10: 6
PAINLEVEL_OUTOF10: 4
PAINLEVEL_OUTOF10: 8

## 2021-08-08 ASSESSMENT — PAIN DESCRIPTION - LOCATION: LOCATION: ABDOMEN

## 2021-08-08 ASSESSMENT — PAIN DESCRIPTION - DESCRIPTORS: DESCRIPTORS: ACHING;SHARP

## 2021-08-08 ASSESSMENT — PAIN DESCRIPTION - PAIN TYPE: TYPE: ACUTE PAIN

## 2021-08-08 NOTE — ED PROVIDER NOTES
[facility] ED  EMERGENCY DEPARTMENT ENCOUNTER      Pt Name: Franci Zavala  MRN: [id number]  [place] 1990  Date of evaluation: 8/8/2021  Provider: Dinah Poole MD    CHIEF COMPLAINT     Chief Complaint   Patient presents with    Abdominal Pain     began 8-6-21    Nausea     N/V/D         HISTORY OF PRESENT ILLNESS   (Location/Symptom, Timing/Onset, Context/Setting,Quality, Duration, Modifying Factors, Severity)  Note limiting factors. Franci Zavala is a 35 y.o. male who presents to the emergency department with a 2-day history of abdominal pain, weakness, nausea and some vomiting. Stool is nonbloody. Patient has a history of Crohn's disease and reports 2 abdominal surgeries in the past, the last one 5 years ago. He thinks his appendix may also have been removed at that time. He takes Phenergan to control his nausea but has run out of it. He has been placed on Remicade in the past but did not like the adverse effects of it. He states his gastroenterologist is trying to start him on Humira which he expects will be in the near future. The history is provided by the patient and medical records. Nursing Notes werereviewed. REVIEW OF SYSTEMS    (2-9 systems for level 4, 10 or more for level 5)     Review of Systems   All other systems reviewed and are negative. Except as noted above the remainder of the review of systems was reviewed and negative. PAST MEDICAL HISTORY     Past Medical History:   Diagnosis Date    Crohn's colitis ([facility] [address]          SURGICALHISTORY       Past Surgical History:   Procedure Laterality Date    COLON SURGERY           CURRENT MEDICATIONS       Current Discharge Medication List      CONTINUE these medications which have NOT CHANGED    Details   omeprazole (PRILOSEC) 40 MG delayed release capsule Take 1 capsule by mouth every morning (before breakfast)  Qty: 30 capsule, Refills: 2             ALLERGIES     Patient has no known allergies.     FAMILY HISTORY       Family History   Problem Relation Age of Onset    Crohn's Disease Maternal Aunt           SOCIAL HISTORY       Social History     Socioeconomic History    Marital status: Single     Spouse name: None    Number of children: None    Years of education: None    Highest education level: None   Occupational History    None   Tobacco Use    Smoking status: Current Every Day Smoker     Packs/day: 0.50     Types: Cigarettes    Smokeless tobacco: Never Used   Vaping Use    Vaping Use: Never used   Substance and Sexual Activity    Alcohol use: Yes     Comment: socially    Drug use: Not Currently    Sexual activity: None   Other Topics Concern    None   Social History Narrative    None     Social Determinants of Health     Financial Resource Strain:     Difficulty of Paying Living Expenses:    Food Insecurity:     Worried About Running Out of Food in the Last Year:     Ran Out of Food in the Last Year:    Transportation Needs:     Lack of Transportation (Medical):      Lack of Transportation (Non-Medical):    Physical Activity:     Days of Exercise per Week:     Minutes of Exercise per Session:    Stress:     Feeling of Stress :    Social Connections:     Frequency of Communication with Friends and Family:     Frequency of Social Gatherings with Friends and Family:     Attends [employer] Services:     Active Member of Clubs or Organizations:     Attends Club or Organization Meetings:     Marital Status:    Intimate Partner Violence:     Fear of Current or Ex-Partner:     Emotionally Abused:     Physically Abused:     Sexually Abused:        SCREENINGS             PHYSICAL EXAM    (up to 7 for level 4, 8 or more for level 5)     ED Triage Vitals   BP Temp Temp Source Pulse Resp SpO2 Height Weight   08/08/21 1238 08/08/21 1237 08/08/21 1237 08/08/21 1237 08/08/21 1237 08/08/21 1237 08/08/21 1237 08/08/21 1237   (!) 145/100 98.4 °F (36.9 °C) Tympanic 60 14 96 % 6' 4\" (1.93 m) 250 lb (113.4 kg)       Physical Exam  Vitals reviewed. Constitutional:       General: He is not in acute distress. Appearance: He is not ill-appearing. HENT:      Head: Normocephalic. Eyes:      General: No scleral icterus. Extraocular Movements: Extraocular movements intact. Cardiovascular:      Rate and Rhythm: Normal rate and regular rhythm. Heart sounds: Normal heart sounds. Pulmonary:      Effort: Pulmonary effort is normal.      Breath sounds: Normal breath sounds. Abdominal:      General: Bowel sounds are increased. Palpations: Abdomen is soft. There is no fluid wave, hepatomegaly or splenomegaly. Tenderness: There is generalized abdominal tenderness. There is no guarding or rebound. Negative signs include De Santiago's sign and McBurney's sign. Hernia: No hernia is present. Musculoskeletal:         General: Normal range of motion. Skin:     General: Skin is warm and dry. Coloration: Skin is not pale. Neurological:      General: No focal deficit present. Mental Status: He is alert and oriented to person, place, and time. DIAGNOSTIC RESULTS     EKG: All EKG's are interpreted by the Emergency Department Physician who either signs orCo-signs this chart in the absence of a cardiologist.    RADIOLOGY:     Interpretation per the Radiologist below, ifavailable at the time of this note:    XR ACUTE ABD SERIES CHEST 1 VW   Final Result   No acute process in the chest.      No bowel obstruction or free air.                ED BEDSIDE ULTRASOUND:   Performed by ED Physician - none    LABS:  Labs Reviewed   CBC WITH AUTO DIFFERENTIAL - Abnormal; Notable for the following components:       Result Value    Hematocrit 40.1 (*)     MCHC 33.7 (*)     All other components within normal limits   COMPREHENSIVE METABOLIC PANEL W/ REFLEX TO MG FOR LOW K - Abnormal; Notable for the following components:    Glucose 111 (*)     BUN 5 (*)     Bun/Cre Ratio 6 (*)     All other components within normal limits   URINALYSIS       All other labs were within normal range ornot returned as of this dictation. EMERGENCY DEPARTMENT COURSE and DIFFERENTIAL DIAGNOSIS/MDM:   Vitals:    Vitals:    08/08/21 1237 08/08/21 1238 08/08/21 1250   BP:  (!) 145/100 (!) 145/97   Pulse: 60     Resp: 14     Temp: 98.4 °F (36.9 °C)     TempSrc: Tympanic     SpO2: 96% 100%    Weight: 250 lb (113.4 kg)     Height: 6' 4\" (1.93 m)              Patient has a normal white count and no temperature elevation. Abdomen films do not show abnormality or gas pattern, obstruction or perforation. I feel he can be treated symptomatically. He was bolused with 2 L of IV fluids in the ED and given IV Zofran and fentanyl. Upon discharge he was given a prescription for promethazine and is referred to outpatient follow-up with his physician and gastroenterologist.    MDM     CONSULTS:  None    PROCEDURES:  Unlessotherwise noted below, none     Procedures    FINAL IMPRESSION      1. Generalized abdominal pain    2. Nausea          DISPOSITION/PLAN   DISPOSITION Decision To Discharge 08/08/2021 03:48:31 PM      PATIENT REFERRED TO:  Akua Beatty MD  [address] [facility] [address] [address] [address] [facility] [address] Pat[address] [place], [place] [id number]            DISCHARGE MEDICATIONS:         Problem List:  There is no problem list on file for this patient. Summation      Patient Course: Discharged.     ED Medicationsadministered this visit:    Medications   0.9 % sodium chloride bolus (1,000 mLs Intravenous New Bag 8/8/21 1320)     Followed by   lactated ringers bolus (has no administration in time range)   ondansetron (ZOFRAN) injection 4 mg (4 mg Intravenous Given 8/8/21 1323)   fentaNYL (SUBLIMAZE) injection 25 mcg (25 mcg Intravenous Given 8/8/21 1323)       New Prescriptions from this visit:    Current Discharge Medication List          Follow-up:  Akua Beatty MD  [address] [facility] [address]  [address] [facility] [address] [facility]  [phone]              Final Impression:   1. Generalized abdominal pain    2.  Nausea               (Please note that portions of this note were completed with a voice recognitionprogram.  Efforts were made to edit the dictations but occasionally words are mis-transcribed.)    Alcides Rodriguez MD (electronically signed)  Attending Emergency Physician            Alcides Rodriguez MD  08/08/21 5795

## 2021-08-17 ENCOUNTER — OFFICE VISIT (OUTPATIENT)
Dept: FAMILY MEDICINE CLINIC | Age: 31
End: 2021-08-17
Payer: MEDICAID

## 2021-08-17 VITALS
BODY MASS INDEX: 30.32 KG/M2 | DIASTOLIC BLOOD PRESSURE: 90 MMHG | HEART RATE: 67 BPM | OXYGEN SATURATION: 98 % | HEIGHT: 76 IN | TEMPERATURE: 97.9 F | SYSTOLIC BLOOD PRESSURE: 144 MMHG | WEIGHT: 249 LBS

## 2021-08-17 DIAGNOSIS — K50.019 CROHN'S DISEASE OF SMALL INTESTINE WITH COMPLICATION (HCC): ICD-10-CM

## 2021-08-17 DIAGNOSIS — R11.0 NAUSEA: ICD-10-CM

## 2021-08-17 DIAGNOSIS — I10 HYPERTENSION, UNSPECIFIED TYPE: Primary | ICD-10-CM

## 2021-08-17 PROCEDURE — 99214 OFFICE O/P EST MOD 30 MIN: CPT | Performed by: FAMILY MEDICINE

## 2021-08-17 PROCEDURE — G8427 DOCREV CUR MEDS BY ELIG CLIN: HCPCS | Performed by: FAMILY MEDICINE

## 2021-08-17 PROCEDURE — 99212 OFFICE O/P EST SF 10 MIN: CPT

## 2021-08-17 PROCEDURE — G8417 CALC BMI ABV UP PARAM F/U: HCPCS | Performed by: FAMILY MEDICINE

## 2021-08-17 PROCEDURE — 4004F PT TOBACCO SCREEN RCVD TLK: CPT | Performed by: FAMILY MEDICINE

## 2021-08-17 RX ORDER — PROMETHAZINE HYDROCHLORIDE 6.25 MG/5ML
25 SYRUP ORAL 4 TIMES DAILY PRN
Qty: 300 ML | Refills: 0 | Status: SHIPPED | OUTPATIENT
Start: 2021-08-17 | End: 2021-10-14 | Stop reason: SDUPTHER

## 2021-08-17 RX ORDER — AMLODIPINE BESYLATE 2.5 MG/1
2.5 TABLET ORAL DAILY
Qty: 30 TABLET | Refills: 3 | Status: SHIPPED | OUTPATIENT
Start: 2021-08-17 | End: 2021-10-14 | Stop reason: SDUPTHER

## 2021-08-17 NOTE — PROGRESS NOTES
HPI:  Patient comes in today for   Chief Complaint   Patient presents with    Hypertension     would like to get his blood pressure checked- feels his blood pressure is high   Here to  Evaluate for elevated BP  Was seen in the ER 2 weeks ago for abdominal pain has h/o crohn's disease with intermittent abdominal pain nausea and emesis,has loose stools,no blood in stools has lost some weight not been eating much. Phenergan helps with nausea and has been using it  chronically. Has had  two bowel surgeries in past while in [facility] . Has established with Gastroenterology recently and has been recommended starting on Humira . Has seen pain management in past for chronic abdominal pain    HISTORY:  Past Medical History:   Diagnosis Date    Crohn's colitis ([facility] [address]        Past Surgical History:   Procedure Laterality Date    COLON SURGERY          Family History   Problem Relation Age of Onset    Hypertension Father     Crohn's Disease Maternal Aunt        Social History     Socioeconomic History    Marital status: Single     Spouse name: Not on file    Number of children: Not on file    Years of education: Not on file    Highest education level: Not on file   Occupational History    Not on file   Tobacco Use    Smoking status: Current Every Day Smoker     Packs/day: 0.50     Types: Cigarettes    Smokeless tobacco: Never Used   Vaping Use    Vaping Use: Never used   Substance and Sexual Activity    Alcohol use: Yes     Comment: socially    Drug use: Not Currently    Sexual activity: Not on file   Other Topics Concern    Not on file   Social History Narrative    Not on file     Social Determinants of Health     Financial Resource Strain:     Difficulty of Paying Living Expenses:    Food Insecurity:     Worried About Running Out of Food in the Last Year:     Ran Out of Food in the Last Year:    Transportation Needs:     Lack of Transportation (Medical):      Lack of Transportation (Non-Medical):    Physical clear.  NECK:  Supple without adenopathy or thyromegaly, no carotid bruits  LUNGS:  CTA all fields  HEART:  RRR without M, R, or G  ABDOMEN:  Mildly obese,soft ,mildly  Tender all over,  without palpable abnormalities  EXTREMITIES:  Without clubbing, cyanosis, or edema, no calf tenderness  NEURO:  No focal deficits. SKIN:  warm to touch,normal texture. No active lesions. ASSESSMENT/PLAN:     Diagnosis Orders   1. Hypertension, unspecified type     2. [facility]         No orders of the defined types were placed in this encounter. Requested Prescriptions     Signed Prescriptions Disp Refills    amLODIPine (NORVASC) 2.5 MG tablet 30 tablet 3     Sig: Take 1 tablet by mouth daily    promethazine (PHENERGAN) 6.25 MG/5ML syrup 300 mL 0     Sig: Take 20 mLs by mouth 4 times daily as needed for Nausea   Will start on Norvasc 2.5 mg daily. Discussed side effects  Phenergan liquid prn for nausea. Patient had been referred to pain managment has not been able to keep his appointment. F/U with gastroenterology as scheduled . Return in about 1 month (around 9/17/2021).     Electronically signed by Madelene Riedel, MD

## 2021-08-18 ENCOUNTER — TELEPHONE (OUTPATIENT)
Dept: FAMILY MEDICINE CLINIC | Age: 31
End: 2021-08-18

## 2021-08-18 NOTE — TELEPHONE ENCOUNTER
Pt calling stating he went to [facility] to  his Phenergan and they wouldn't let him get him get it, please check with pharmacy and advise pt if and when he can get this medication.

## 2021-08-19 ENCOUNTER — TELEPHONE (OUTPATIENT)
Dept: FAMILY MEDICINE CLINIC | Age: 31
End: 2021-08-19

## 2021-08-20 NOTE — TELEPHONE ENCOUNTER
Patient was advised at office visit on excessive use of phenergan syrup but stated that it is the only thing that helps with his GI discomfort. He has to see Gastroenterology for further management referral was made but has not followed up.

## 2021-09-28 ENCOUNTER — TELEPHONE (OUTPATIENT)
Dept: FAMILY MEDICINE CLINIC | Age: 31
End: 2021-09-28

## 2021-09-28 NOTE — TELEPHONE ENCOUNTER
Attempted to reach patient regarding missed appointment on 9/28/21. Unable to contact at this time. Number in system went to VM of a woman named Tabitha, so writer did not leave message. Letter mailed to reschedule.

## 2021-10-13 DIAGNOSIS — I10 HYPERTENSION, UNSPECIFIED TYPE: Primary | ICD-10-CM

## 2021-10-13 DIAGNOSIS — R11.0 NAUSEA: ICD-10-CM

## 2021-10-13 RX ORDER — PROMETHAZINE HYDROCHLORIDE 6.25 MG/5ML
25 SYRUP ORAL 4 TIMES DAILY PRN
Qty: 300 ML | Refills: 0 | OUTPATIENT
Start: 2021-10-13

## 2021-10-13 NOTE — TELEPHONE ENCOUNTER
Pt called in for med refill request, pt also wanted phenergan but pt was advised he needed to follow up with GI to get that refilled.  See previous note from 08/20/2021

## 2021-10-13 NOTE — TELEPHONE ENCOUNTER
Julito Mendes is requesting a refill on the following medication(s):  Requested Prescriptions     Pending Prescriptions Disp Refills    amLODIPine (NORVASC) 2.5 MG tablet 30 tablet 3     Sig: Take 1 tablet by mouth daily       Last Visit Date (If Applicable):  2/93/8756    Next Visit Date:    Visit date not found

## 2021-10-14 DIAGNOSIS — R11.0 NAUSEA: ICD-10-CM

## 2021-10-14 RX ORDER — PROMETHAZINE HYDROCHLORIDE 6.25 MG/5ML
25 SYRUP ORAL 4 TIMES DAILY PRN
Qty: 300 ML | Refills: 0 | Status: SHIPPED | OUTPATIENT
Start: 2021-10-14 | End: 2021-10-26 | Stop reason: SDUPTHER

## 2021-10-14 RX ORDER — AMLODIPINE BESYLATE 2.5 MG/1
2.5 TABLET ORAL DAILY
Qty: 30 TABLET | Refills: 3 | Status: SHIPPED | OUTPATIENT
Start: 2021-10-14 | End: 2021-12-06 | Stop reason: SDUPTHER

## 2021-10-14 NOTE — TELEPHONE ENCOUNTER
Josh Rabago is requesting a refill on the following medication(s):  Requested Prescriptions     Pending Prescriptions Disp Refills    promethazine (PHENERGAN) 6.25 MG/5ML syrup 300 mL 0     Sig: Take 20 mLs by mouth 4 times daily as needed for Nausea       Last Visit Date (If Applicable):  4/21/7181    Next Visit Date:    Visit date not found

## 2021-10-24 ENCOUNTER — APPOINTMENT (OUTPATIENT)
Dept: GENERAL RADIOLOGY | Age: 31
End: 2021-10-24
Payer: MEDICAID

## 2021-10-24 ENCOUNTER — HOSPITAL ENCOUNTER (EMERGENCY)
Age: 31
Discharge: HOME | End: 2021-10-24
Attending: EMERGENCY MEDICINE
Payer: MEDICAID

## 2021-10-24 VITALS
DIASTOLIC BLOOD PRESSURE: 91 MMHG | BODY MASS INDEX: 29.22 KG/M2 | HEART RATE: 87 BPM | TEMPERATURE: 98.3 F | SYSTOLIC BLOOD PRESSURE: 155 MMHG | WEIGHT: 240 LBS | RESPIRATION RATE: 15 BRPM | OXYGEN SATURATION: 98 % | HEIGHT: 76 IN

## 2021-10-24 DIAGNOSIS — L03.116 CELLULITIS OF LEFT LOWER EXTREMITY: Primary | ICD-10-CM

## 2021-10-24 LAB
ABSOLUTE EOS #: 0.34 K/UL (ref 0–0.44)
ABSOLUTE IMMATURE GRANULOCYTE: 0.03 K/UL (ref 0–0.3)
ABSOLUTE LYMPH #: 1.09 K/UL (ref 1.1–3.7)
ABSOLUTE MONO #: 1.02 K/UL (ref 0.1–1.2)
ANION GAP SERPL CALCULATED.3IONS-SCNC: 11 MMOL/L (ref 9–17)
BASOPHILS # BLD: 0 % (ref 0–2)
BASOPHILS ABSOLUTE: 0.03 K/UL (ref 0–0.2)
BUN BLDV-MCNC: 6 MG/DL (ref 6–20)
BUN/CREAT BLD: 7 (ref 9–20)
CALCIUM SERPL-MCNC: 9.7 MG/DL (ref 8.6–10.4)
CHLORIDE BLD-SCNC: 97 MMOL/L (ref 98–107)
CO2: 28 MMOL/L (ref 20–31)
CREAT SERPL-MCNC: 0.85 MG/DL (ref 0.7–1.2)
D-DIMER QUANTITATIVE: 0.46 MG/L FEU (ref 0–0.59)
DIFFERENTIAL TYPE: ABNORMAL
EOSINOPHILS RELATIVE PERCENT: 3 % (ref 1–4)
GFR AFRICAN AMERICAN: >60 ML/MIN
GFR NON-AFRICAN AMERICAN: >60 ML/MIN
GFR SERPL CREATININE-BSD FRML MDRD: ABNORMAL ML/MIN/{1.73_M2}
GFR SERPL CREATININE-BSD FRML MDRD: ABNORMAL ML/MIN/{1.73_M2}
GLUCOSE BLD-MCNC: 107 MG/DL (ref 70–99)
HCT VFR BLD CALC: 40.5 % (ref 40.7–50.3)
HEMOGLOBIN: 13.7 G/DL (ref 13–17)
IMMATURE GRANULOCYTES: 0 %
INR BLD: 1
LYMPHOCYTES # BLD: 10 % (ref 24–43)
MCH RBC QN AUTO: 28.6 PG (ref 25.2–33.5)
MCHC RBC AUTO-ENTMCNC: 33.8 G/DL (ref 25.2–33.5)
MCV RBC AUTO: 84.6 FL (ref 82.6–102.9)
MONOCYTES # BLD: 9 % (ref 3–12)
NRBC AUTOMATED: 0 PER 100 WBC
PARTIAL THROMBOPLASTIN TIME: 30.7 SEC (ref 23.9–33.8)
PDW BLD-RTO: 13.2 % (ref 11.8–14.4)
PLATELET # BLD: 260 K/UL (ref 138–453)
PLATELET ESTIMATE: ABNORMAL
PMV BLD AUTO: 10.9 FL (ref 8.1–13.5)
POTASSIUM SERPL-SCNC: 3.7 MMOL/L (ref 3.7–5.3)
PROTHROMBIN TIME: 12.8 SEC (ref 11.5–14.2)
RBC # BLD: 4.79 M/UL (ref 4.21–5.77)
RBC # BLD: ABNORMAL 10*6/UL
SEG NEUTROPHILS: 78 % (ref 36–65)
SEGMENTED NEUTROPHILS ABSOLUTE COUNT: 8.29 K/UL (ref 1.5–8.1)
SODIUM BLD-SCNC: 136 MMOL/L (ref 135–144)
URIC ACID: 7.4 MG/DL (ref 3.4–7)
WBC # BLD: 10.8 K/UL (ref 3.5–11.3)
WBC # BLD: ABNORMAL 10*3/UL

## 2021-10-24 PROCEDURE — 85379 FIBRIN DEGRADATION QUANT: CPT

## 2021-10-24 PROCEDURE — 85730 THROMBOPLASTIN TIME PARTIAL: CPT

## 2021-10-24 PROCEDURE — U0005 INFEC AGEN DETEC AMPLI PROBE: HCPCS

## 2021-10-24 PROCEDURE — 96374 THER/PROPH/DIAG INJ IV PUSH: CPT

## 2021-10-24 PROCEDURE — 84550 ASSAY OF BLOOD/URIC ACID: CPT

## 2021-10-24 PROCEDURE — 80048 BASIC METABOLIC PNL TOTAL CA: CPT

## 2021-10-24 PROCEDURE — 6360000002 HC RX W HCPCS: Performed by: EMERGENCY MEDICINE

## 2021-10-24 PROCEDURE — 73630 X-RAY EXAM OF FOOT: CPT

## 2021-10-24 PROCEDURE — 73590 X-RAY EXAM OF LOWER LEG: CPT

## 2021-10-24 PROCEDURE — 6370000000 HC RX 637 (ALT 250 FOR IP): Performed by: EMERGENCY MEDICINE

## 2021-10-24 PROCEDURE — U0003 INFECTIOUS AGENT DETECTION BY NUCLEIC ACID (DNA OR RNA); SEVERE ACUTE RESPIRATORY SYNDROME CORONAVIRUS 2 (SARS-COV-2) (CORONAVIRUS DISEASE [COVID-19]), AMPLIFIED PROBE TECHNIQUE, MAKING USE OF HIGH THROUGHPUT TECHNOLOGIES AS DESCRIBED BY CMS-2020-01-R: HCPCS

## 2021-10-24 PROCEDURE — 85025 COMPLETE CBC W/AUTO DIFF WBC: CPT

## 2021-10-24 PROCEDURE — 85610 PROTHROMBIN TIME: CPT

## 2021-10-24 PROCEDURE — 99284 EMERGENCY DEPT VISIT MOD MDM: CPT

## 2021-10-24 RX ORDER — DOXYCYCLINE 100 MG/1
100 TABLET ORAL 2 TIMES DAILY
Qty: 20 TABLET | Refills: 0 | Status: SHIPPED | OUTPATIENT
Start: 2021-10-24 | End: 2021-11-03

## 2021-10-24 RX ORDER — HYDROCODONE BITARTRATE AND ACETAMINOPHEN 5; 325 MG/1; MG/1
1 TABLET ORAL EVERY 6 HOURS PRN
Qty: 12 TABLET | Refills: 0 | Status: SHIPPED | OUTPATIENT
Start: 2021-10-24 | End: 2021-10-27

## 2021-10-24 RX ORDER — DOXYCYCLINE 100 MG/1
100 CAPSULE ORAL ONCE
Status: COMPLETED | OUTPATIENT
Start: 2021-10-24 | End: 2021-10-24

## 2021-10-24 RX ORDER — KETOROLAC TROMETHAMINE 30 MG/ML
15 INJECTION, SOLUTION INTRAMUSCULAR; INTRAVENOUS ONCE
Status: COMPLETED | OUTPATIENT
Start: 2021-10-24 | End: 2021-10-24

## 2021-10-24 RX ORDER — CEPHALEXIN 250 MG/1
250 CAPSULE ORAL ONCE
Status: COMPLETED | OUTPATIENT
Start: 2021-10-24 | End: 2021-10-24

## 2021-10-24 RX ORDER — HYDROCODONE BITARTRATE AND ACETAMINOPHEN 5; 325 MG/1; MG/1
1 TABLET ORAL ONCE
Status: COMPLETED | OUTPATIENT
Start: 2021-10-24 | End: 2021-10-24

## 2021-10-24 RX ORDER — COLCHICINE 0.6 MG/1
1.2 TABLET ORAL ONCE
Status: COMPLETED | OUTPATIENT
Start: 2021-10-24 | End: 2021-10-24

## 2021-10-24 RX ORDER — CEPHALEXIN 500 MG/1
500 CAPSULE ORAL 3 TIMES DAILY
Qty: 30 CAPSULE | Refills: 0 | Status: SHIPPED | OUTPATIENT
Start: 2021-10-24 | End: 2021-12-21

## 2021-10-24 RX ADMIN — DOXYCYCLINE 100 MG: 100 CAPSULE ORAL at 17:38

## 2021-10-24 RX ADMIN — COLCHICINE 1.2 MG: 0.6 TABLET ORAL at 16:34

## 2021-10-24 RX ADMIN — HYDROCODONE BITARTRATE AND ACETAMINOPHEN 1 TABLET: 5; 325 TABLET ORAL at 17:39

## 2021-10-24 RX ADMIN — CEPHALEXIN 250 MG: 250 CAPSULE ORAL at 17:38

## 2021-10-24 RX ADMIN — KETOROLAC TROMETHAMINE 15 MG: 30 INJECTION, SOLUTION INTRAMUSCULAR at 15:59

## 2021-10-24 ASSESSMENT — PAIN DESCRIPTION - LOCATION
LOCATION: LEG
LOCATION: LEG

## 2021-10-24 ASSESSMENT — ENCOUNTER SYMPTOMS
COUGH: 0
SHORTNESS OF BREATH: 0
ABDOMINAL PAIN: 1
DIARRHEA: 1

## 2021-10-24 ASSESSMENT — PAIN SCALES - GENERAL
PAINLEVEL_OUTOF10: 8
PAINLEVEL_OUTOF10: 8
PAINLEVEL_OUTOF10: 9

## 2021-10-24 ASSESSMENT — PAIN DESCRIPTION - PAIN TYPE
TYPE: ACUTE PAIN
TYPE: ACUTE PAIN

## 2021-10-24 ASSESSMENT — PAIN DESCRIPTION - ORIENTATION: ORIENTATION: LEFT

## 2021-10-24 NOTE — ED PROVIDER NOTES
[facility] ED  EMERGENCY DEPARTMENT ENCOUNTER      Pt Name: Evaristo Marie  MRN: [id number]  [place] 1990  Date of evaluation: 10/24/2021  Provider: Racheal Zimmer MD    [address] [place] [address]     Chief Complaint   Patient presents with    Leg Pain     left leg pain started yesterday, some in the r. leg also. Pt reports swelling in l. leg. HISTORY OF PRESENT ILLNESS   (Location/Symptom, Timing/Onset, Context/Setting,Quality, Duration, Modifying Factors, Severity)  Note limiting factors. Evaristo Marie is a 35 y.o. male who presents to the emergency department with a chief complaint of redness, pain and swelling on the anterolateral surface of the mid and distal left lower leg and the left foot. He cannot recall any trauma. Ambulation is especially painful for him. Patient has a history of Crohn's disease. He does not report any infectious symptoms. The history is provided by the patient and medical records. Nursing Notes werereviewed. REVIEW OF SYSTEMS    (2-9 systems for level 4, 10 or more for level 5)     Review of Systems   Constitutional: Negative for fever. Respiratory: Negative for cough and shortness of breath. Gastrointestinal: Positive for abdominal pain and diarrhea. Genitourinary: Negative for difficulty urinating. All other systems reviewed and are negative. Except as noted above the remainder of the review of systems was reviewed and negative.        PAST MEDICAL HISTORY     Past Medical History:   Diagnosis Date    Crohn's colitis ([facility] [address]          SURGICALHISTORY       Past Surgical History:   Procedure Laterality Date    COLON SURGERY           CURRENT MEDICATIONS       Discharge Medication List as of 10/24/2021  5:31 PM      CONTINUE these medications which have NOT CHANGED    Details   amLODIPine (NORVASC) 2.5 MG tablet Take 1 tablet by mouth daily, Disp-30 tablet, R-3Normal      promethazine (PHENERGAN) 6.25 MG/5ML syrup Take 20 mLs by mouth 4 times SCREENINGS    Tabitha Coma Scale  Eye Opening: Spontaneous  Best Verbal Response: Oriented  Best Motor Response: Obeys commands  Tabitha Coma Scale Score: 15        PHYSICAL EXAM    (up to 7 for level 4, 8 or more for level 5)     ED Triage Vitals [10/24/21 1447]   BP Temp Temp Source Pulse Resp SpO2 Height Weight   (!) 173/110 98.3 °F (36.8 °C) Tympanic 114 18 98 % 6' 4\" (1.93 m) 240 lb (108.9 kg)       Physical Exam  Vitals reviewed. Constitutional:       Appearance: He is obese. Comments: Patient appears in discomfort due to his leg pain. HENT:      Head: Normocephalic. Right Ear: External ear normal.      Left Ear: External ear normal.      Nose: Nose normal.   Eyes:      Extraocular Movements: Extraocular movements intact. Cardiovascular:      Rate and Rhythm: Normal rate and regular rhythm. Heart sounds: Normal heart sounds. Pulmonary:      Effort: Pulmonary effort is normal. No respiratory distress. Breath sounds: Normal breath sounds. Abdominal:      Palpations: Abdomen is soft. Tenderness: There is no abdominal tenderness. Musculoskeletal:      Cervical back: Neck supple. Comments: The left lower leg appears swollen compared to the right. Compartments are soft and he is not focally tender to palpation posteriorly over the calf. There is some erythema over the anterolateral surface distally in the left lower leg and also on the dorsum of the foot mainly on the dorsal lateral surface. Pulses are intact. Skin does not appear particularly warm. No open wounds are seen on the foot. Skin:     General: Skin is warm and dry. Coloration: Skin is not pale. Neurological:      Mental Status: He is alert.          DIAGNOSTIC RESULTS     EKG: All EKG's are interpreted by the Emergency Department Physician who either signs orCo-signs this chart in the absence of a cardiologist.    RADIOLOGY:     Interpretation per the Radiologist below, ifavailable at the time of this note:    XR FOOT LEFT (MIN 3 VIEWS)   Final Result   No acute bony abnormalities are noted         XR TIBIA FIBULA LEFT (2 VIEWS)   Final Result   No acute osseous abnormality. ED BEDSIDE ULTRASOUND:   Performed by ED Physician - none    LABS:  Labs Reviewed   CBC WITH AUTO DIFFERENTIAL - Abnormal; Notable for the following components:       Result Value    Hematocrit 40.5 (*)     MCHC 33.8 (*)     Seg Neutrophils 78 (*)     Lymphocytes 10 (*)     Segs Absolute 8.29 (*)     Absolute Lymph # 1.09 (*)     All other components within normal limits   BASIC METABOLIC PANEL W/ REFLEX TO MG FOR LOW K - Abnormal; Notable for the following components:    Glucose 107 (*)     Bun/Cre Ratio 7 (*)     Chloride 97 (*)     All other components within normal limits   URIC ACID - Abnormal; Notable for the following components:    Uric Acid 7.4 (*)     All other components within normal limits   PROTIME-INR   APTT   D-DIMER, QUANTITATIVE   COVID-19       All other labs were within normal range ornot returned as of this dictation. EMERGENCY DEPARTMENT COURSE and DIFFERENTIAL DIAGNOSIS/MDM:   Vitals:    Vitals:    10/24/21 1630 10/24/21 1645 10/24/21 1700 10/24/21 1739   BP: (!) 142/98  (!) 155/91    Pulse:    87   Resp:    15   Temp:       TempSrc:       SpO2:  99% 98% 98%   Weight:       Height: The white cell count is normal at 10.8. There is a slight bump in the uric acid level at 7.4. Kidney function is normal.  D-dimer is normal.  Patient was treated with Toradol 15 mg IV and colchicine 1.2 mg by mouth. An hour later he reported no relief in pain. His presentation is likely consistent with cellulitis and for this he is placed on doxycycline and Keflex with initial doses started in the ED. Shivam Rue is prescribed for pain and he is also prescribed Voltaren gel to be used topically for pain relief.   Follow-up instructions are provided and he is to return for worsening symptoms. MDM    CONSULTS:  None    PROCEDURES:  Unlessotherwise noted below, none     Procedures    FINAL IMPRESSION      1. Cellulitis of left lower extremity          DISPOSITION/PLAN   DISPOSITION Decision To Discharge 10/24/2021 05:21:52 PM      PATIENT REFERRED TO:  Steffi Alexandre MD  [address] [facility]  [phone]    In 2 days        DISCHARGE MEDICATIONS:         Problem List:  There is no problem list on file for this patient. Summation      Patient Course: Discharged. ED Medicationsadministered this visit:    Medications   colchicine (COLCRYS) tablet 1.2 mg (1.2 mg Oral Given 10/24/21 1634)   ketorolac (TORADOL) injection 15 mg (15 mg IntraVENous Given 10/24/21 1559)   doxycycline monohydrate (MONODOX) capsule 100 mg (100 mg Oral Given 10/24/21 1738)   cephALEXin (KEFLEX) capsule 250 mg (250 mg Oral Given 10/24/21 1738)   HYDROcodone-acetaminophen (NORCO) 5-325 MG per tablet 1 tablet (1 tablet Oral Given 10/24/21 1739)       New Prescriptions from this visit:    Discharge Medication List as of 10/24/2021  5:31 PM      START taking these medications    Details   cephALEXin (KEFLEX) 500 MG capsule Take 1 capsule by mouth 3 times daily, Disp-30 capsule, R-0Normal      doxycycline monohydrate (ADOXA) 100 MG tablet Take 1 tablet by mouth 2 times daily for 10 days, Disp-20 tablet, R-0Normal      HYDROcodone-acetaminophen (NORCO) 5-325 MG per tablet Take 1 tablet by mouth every 6 hours as needed for Pain for up to 3 days. , Disp-12 tablet, R-0Normal      diclofenac sodium (VOLTAREN) 1 % GEL Apply 4 g topically 3 times daily, Topical, 3 TIMES DAILY Starting Sun 10/24/2021, Disp-100 g, R-0, Normal             Follow-up:  Steffi Alexandre MD  [address] [facility]  [phone]    In 2 days          Final Impression:   1.  Cellulitis of left lower extremity               (Please note that portions of this note were completed with a voice recognitionprogram.  Efforts were made to edit the dictations but occasionally words are mis-transcribed.)    Cornel Talley MD (electronically signed)  Attending Emergency Physician            Cornel Talley MD  10/24/21 4007

## 2021-10-25 ENCOUNTER — TELEPHONE (OUTPATIENT)
Dept: FAMILY MEDICINE CLINIC | Age: 31
End: 2021-10-25

## 2021-10-26 ENCOUNTER — TELEPHONE (OUTPATIENT)
Dept: GASTROENTEROLOGY | Age: 31
End: 2021-10-26

## 2021-10-26 ENCOUNTER — OFFICE VISIT (OUTPATIENT)
Dept: FAMILY MEDICINE CLINIC | Age: 31
End: 2021-10-26
Payer: MEDICAID

## 2021-10-26 ENCOUNTER — HOSPITAL ENCOUNTER (OUTPATIENT)
Dept: INTERVENTIONAL RADIOLOGY/VASCULAR | Age: 31
Discharge: HOME | End: 2021-10-28
Payer: MEDICAID

## 2021-10-26 VITALS
WEIGHT: 250 LBS | BODY MASS INDEX: 30.44 KG/M2 | RESPIRATION RATE: 18 BRPM | SYSTOLIC BLOOD PRESSURE: 112 MMHG | HEIGHT: 76 IN | HEART RATE: 107 BPM | OXYGEN SATURATION: 100 % | TEMPERATURE: 99.4 F | DIASTOLIC BLOOD PRESSURE: 78 MMHG

## 2021-10-26 DIAGNOSIS — L03.119 CELLULITIS OF LOWER EXTREMITY, UNSPECIFIED LATERALITY: ICD-10-CM

## 2021-10-26 DIAGNOSIS — R11.0 NAUSEA: ICD-10-CM

## 2021-10-26 DIAGNOSIS — M79.605 BILATERAL LEG PAIN: ICD-10-CM

## 2021-10-26 DIAGNOSIS — M79.604 BILATERAL LEG PAIN: ICD-10-CM

## 2021-10-26 DIAGNOSIS — E79.0 HYPERURICEMIA: ICD-10-CM

## 2021-10-26 DIAGNOSIS — R22.43 LOCALIZED SWELLING OF BOTH LOWER LEGS: ICD-10-CM

## 2021-10-26 LAB
SARS-COV-2: NORMAL
SARS-COV-2: NOT DETECTED
SOURCE: NORMAL

## 2021-10-26 PROCEDURE — 4004F PT TOBACCO SCREEN RCVD TLK: CPT | Performed by: FAMILY MEDICINE

## 2021-10-26 PROCEDURE — 99213 OFFICE O/P EST LOW 20 MIN: CPT | Performed by: FAMILY MEDICINE

## 2021-10-26 PROCEDURE — G8427 DOCREV CUR MEDS BY ELIG CLIN: HCPCS | Performed by: FAMILY MEDICINE

## 2021-10-26 PROCEDURE — 99214 OFFICE O/P EST MOD 30 MIN: CPT | Performed by: FAMILY MEDICINE

## 2021-10-26 PROCEDURE — 93970 EXTREMITY STUDY: CPT

## 2021-10-26 PROCEDURE — G8484 FLU IMMUNIZE NO ADMIN: HCPCS | Performed by: FAMILY MEDICINE

## 2021-10-26 PROCEDURE — G8417 CALC BMI ABV UP PARAM F/U: HCPCS | Performed by: FAMILY MEDICINE

## 2021-10-26 RX ORDER — PROMETHAZINE HYDROCHLORIDE 6.25 MG/5ML
25 SYRUP ORAL 4 TIMES DAILY PRN
Qty: 300 ML | Refills: 0 | Status: SHIPPED | OUTPATIENT
Start: 2021-10-26 | End: 2021-11-18 | Stop reason: SDUPTHER

## 2021-10-26 RX ORDER — TRAMADOL HYDROCHLORIDE 50 MG/1
50 TABLET ORAL EVERY 6 HOURS PRN
Qty: 28 TABLET | Refills: 0 | Status: SHIPPED | OUTPATIENT
Start: 2021-10-26 | End: 2021-11-02

## 2021-10-26 RX ORDER — ALLOPURINOL 100 MG/1
100 TABLET ORAL DAILY
Qty: 30 TABLET | Refills: 3 | Status: SHIPPED | OUTPATIENT
Start: 2021-10-26 | End: 2022-01-18

## 2021-10-26 RX ORDER — COLCHICINE 0.6 MG/1
0.6 TABLET ORAL DAILY
Qty: 30 TABLET | Refills: 0 | Status: SHIPPED | OUTPATIENT
Start: 2021-10-26 | End: 2022-01-18

## 2021-10-26 NOTE — TELEPHONE ENCOUNTER
Patient called the office and LVM stated that he never received the omeprazole 40 mg that was prescribed to him on 7/14/21 by Dr. Oumar Vera. Patient would like to know if you can reorder the medication and send it to the [facility].

## 2021-10-26 NOTE — TELEPHONE ENCOUNTER
Delfin Eddy called requesting a refill of the below medication which has been pended for you:     Requested Prescriptions      No prescriptions requested or ordered in this encounter       Last Appointment Date: 10/26/2021  Next Appointment Date: 11/29/2021    No Known Allergies

## 2021-10-26 NOTE — PROGRESS NOTES
HPI:  Patient comes in today for   Chief Complaint   Patient presents with    Edema     bilateral leg swelling. started last week. was seen in the ER. patient stated that he had gout and cellulitis, was given medication, atb and pain medication.  Gout     was seen in the ER 10/24 at [facility]. pain with walking   Here to f/u was seen in the ER last week for left leg swelling with redness was painful to walk, now has swelling in both legs was treated for cellulitis is on oral antibiotics keflex and doxycycline also his uric acid was slightly elevated. No chest pain  or SOB. No fever. Has  h/o crohn's disease with chronic abdominal pain nausea and emesis,has loose stools,no blood in stools . Phenergan helps with nausea and has been using it  chronically. Has established with Gastroenterology recently and has been recommended starting on Humira . Has seen pain management in past for chronic abdominal pain needs new referral    HISTORY:  Past Medical History:   Diagnosis Date    Crohn's colitis ([facility] [address]        Past Surgical History:   Procedure Laterality Date    COLON SURGERY          Family History   Problem Relation Age of Onset    Hypertension Father     Crohn's Disease Maternal Aunt        Social History     Socioeconomic History    Marital status: Single     Spouse name: Not on file    Number of children: Not on file    Years of education: Not on file    Highest education level: Not on file   Occupational History    Not on file   Tobacco Use    Smoking status: Current Every Day Smoker     Packs/day: 0.50     Types: Cigarettes    Smokeless tobacco: Never Used   Vaping Use    Vaping Use: Never used   Substance and Sexual Activity    Alcohol use: Yes     Comment: socially    Drug use: Not Currently    Sexual activity: Not on file   Other Topics Concern    Not on file   Social History Narrative    Not on file     Social Determinants of Health     Financial Resource Strain:     Difficulty of Paying Living Expenses:    Food Insecurity:     Worried About [facility] in the Last Year:     [address] [facility] in the Last Year:    Transportation Needs:     Lack of Transportation (Medical):  Lack of Transportation (Non-Medical):    Physical Activity:     Days of Exercise per Week:     Minutes of Exercise per Session:    Stress:     Feeling of Stress :    Social Connections:     Frequency of Communication with Friends and Family:     Frequency of Social Gatherings with Friends and Family:     Attends Rest[employer] Services:     Active Member of Clubs or Organizations:     Attends Club or Organization Meetings:     Marital Status:    Intimate Partner Violence:     Fear of Current or Ex-Partner:     Emotionally Abused:     Physically Abused:     Sexually Abused:        Current Outpatient Medications   Medication Sig Dispense Refill    cephALEXin (KEFLEX) 500 MG capsule Take 1 capsule by mouth 3 times daily 30 capsule 0    doxycycline monohydrate (ADOXA) 100 MG tablet Take 1 tablet by mouth 2 times daily for 10 days 20 tablet 0    HYDROcodone-acetaminophen (NORCO) 5-325 MG per tablet Take 1 tablet by mouth every 6 hours as needed for Pain for up to 3 days. 12 tablet 0    diclofenac sodium (VOLTAREN) 1 % GEL Apply 4 g topically 3 times daily 100 g 0    amLODIPine (NORVASC) 2.5 MG tablet Take 1 tablet by mouth daily 30 tablet 3    promethazine (PHENERGAN) 6.25 MG/5ML syrup Take 20 mLs by mouth 4 times daily as needed for Nausea 300 mL 0    omeprazole (PRILOSEC) 40 MG delayed release capsule Take 1 capsule by mouth every morning (before breakfast) 30 capsule 2     No current facility-administered medications for this visit.         No Known Allergies    REVIEW OF SYSTEMS:  General: No fevers, chills, change in weight  HEENT: No double vision, blurry vision, runny nose, sore throat, tinnitus  Cardio: No chest pain, palpitations, KIM, edema, PND  Pulmonary: No cough, hemoptysis, SOB  GI: Has nausea chronically ,no  vomiting, dysphagia, odynophagia,has  Diarrhea,no blood in stools  : No dysuria, hematuria, urgency, incontinence  Musculoskeletal: No muscle or joint aches, no joint swelling  Neuro: No dizziness/lightheadedness, no seizures  Endocrine: No polyuria, polydipsia, polyphagia, no temperature intolerance  Skin: No lesions or itching  No problems with ADLs  Sleep: Good  Psychiatric: No depression or anxiety    PHYSICAL EXAM:  VS:  /78   Pulse 107   Temp 99.4 °F (37.4 °C)   Resp 18   Ht 6' 4\" (1.93 m)   Wt 250 lb (113.4 kg)   SpO2 100%   BMI 30.43 kg/m²   General:  Alert and oriented, NAD  HEENT:  TMs, NINI, EOMI, Conjunctivae clear       Throat currently clear. NECK:  Supple without adenopathy or thyromegaly, no carotid bruits  LUNGS:  CTA all fields  HEART:  RRR without M, R, or G  ABDOMEN:  Mildly obese,soft ,mildly  tender all over,  without palpable abnormalities  EXTREMITIES:  Both legs with swelling , redness and tenderness  NEURO:  No focal deficits. SKIN:  warm to touch,normal texture. No active lesions. ASSESSMENT/PLAN:     Diagnosis Orders   1. Cellulitis of lower extremity, unspecified laterality  traMADol (ULTRAM) 50 MG tablet    [facility], [place]   2. Localized swelling of both lower legs  US DUP LOWER EXTREMITY LEFT JOHNY    US DUP LOWER EXTREMITY RIGHT JOHNY    traMADol (ULTRAM) 50 MG tablet    [facility], [place]   3. Bilateral leg pain  US DUP LOWER EXTREMITY LEFT JOHNY    US DUP LOWER EXTREMITY RIGHT JOHNY    traMADol (ULTRAM) 50 MG tablet    [facility], [place]   4.  Hyperuricemia         Orders Placed This Encounter   Procedures    US DUP LOWER EXTREMITY LEFT JOHNY     Standing Status:   Future     Standing Expiration Date:   10/26/2022    US DUP LOWER EXTREMITY RIGHT JOHNY     Standing Status:   Future     Standing Expiration Date:   10/26/2022   [facility],

## 2021-10-27 ENCOUNTER — TELEPHONE (OUTPATIENT)
Dept: FAMILY MEDICINE CLINIC | Age: 31
End: 2021-10-27

## 2021-10-27 NOTE — TELEPHONE ENCOUNTER
----- Message from Joy Romero MD sent at 10/27/2021 11:12 AM EDT -----  No blood clots seen on venous scan,keep feet elevated and continue antibiotics if not improved needs to f/u

## 2021-11-18 DIAGNOSIS — R11.0 NAUSEA: ICD-10-CM

## 2021-11-18 RX ORDER — PROMETHAZINE HYDROCHLORIDE 6.25 MG/5ML
25 SYRUP ORAL 4 TIMES DAILY PRN
Qty: 300 ML | Refills: 0 | Status: SHIPPED | OUTPATIENT
Start: 2021-11-18 | End: 2021-11-20 | Stop reason: SDUPTHER

## 2021-11-18 RX ORDER — PROMETHAZINE HYDROCHLORIDE 6.25 MG/5ML
25 SYRUP ORAL 4 TIMES DAILY PRN
Qty: 300 ML | Refills: 0 | Status: CANCELLED | OUTPATIENT
Start: 2021-11-18

## 2021-11-18 NOTE — TELEPHONE ENCOUNTER
Aline Taylor is requesting a refill on the following medication(s):  Requested Prescriptions     Pending Prescriptions Disp Refills    promethazine (PHENERGAN) 6.25 MG/5ML syrup 300 mL 0     Sig: Take 20 mLs by mouth 4 times daily as needed for Nausea       Last Visit Date (If Applicable):  21/28/9071    Next Visit Date:    11/22/2021

## 2021-11-19 DIAGNOSIS — R11.0 NAUSEA: ICD-10-CM

## 2021-11-19 NOTE — TELEPHONE ENCOUNTER
Lbkatarzyna Marker is requesting a refill on the following medication(s):  Requested Prescriptions     Pending Prescriptions Disp Refills    promethazine (PHENERGAN) 6.25 MG/5ML syrup 300 mL 0     Sig: Take 20 mLs by mouth 4 times daily as needed for Nausea       Last Visit Date (If Applicable):  05/40/2574    Next Visit Date:    11/22/2021

## 2021-11-20 RX ORDER — PROMETHAZINE HYDROCHLORIDE 6.25 MG/5ML
25 SYRUP ORAL 4 TIMES DAILY PRN
Qty: 300 ML | Refills: 0 | Status: SHIPPED | OUTPATIENT
Start: 2021-11-20 | End: 2021-12-08 | Stop reason: SDUPTHER

## 2021-11-22 ENCOUNTER — TELEPHONE (OUTPATIENT)
Dept: FAMILY MEDICINE CLINIC | Age: 31
End: 2021-11-22

## 2021-11-22 NOTE — TELEPHONE ENCOUNTER
Patient presented to the window at 11:02am for his 10:40am appt. Dr. Colón  was unable to see him at this time, so he was rescheduled for 11/23/21.

## 2021-11-23 ENCOUNTER — OFFICE VISIT (OUTPATIENT)
Dept: FAMILY MEDICINE CLINIC | Age: 31
End: 2021-11-23
Payer: MEDICAID

## 2021-11-23 VITALS
TEMPERATURE: 96.2 F | BODY MASS INDEX: 31.42 KG/M2 | HEART RATE: 74 BPM | SYSTOLIC BLOOD PRESSURE: 122 MMHG | HEIGHT: 76 IN | OXYGEN SATURATION: 99 % | RESPIRATION RATE: 14 BRPM | DIASTOLIC BLOOD PRESSURE: 80 MMHG | WEIGHT: 258 LBS

## 2021-11-23 DIAGNOSIS — R20.2 PARESTHESIA OF RIGHT LEG: Primary | ICD-10-CM

## 2021-11-23 PROCEDURE — G8484 FLU IMMUNIZE NO ADMIN: HCPCS | Performed by: FAMILY MEDICINE

## 2021-11-23 PROCEDURE — 4004F PT TOBACCO SCREEN RCVD TLK: CPT | Performed by: FAMILY MEDICINE

## 2021-11-23 PROCEDURE — 99212 OFFICE O/P EST SF 10 MIN: CPT | Performed by: FAMILY MEDICINE

## 2021-11-23 PROCEDURE — G8417 CALC BMI ABV UP PARAM F/U: HCPCS | Performed by: FAMILY MEDICINE

## 2021-11-23 PROCEDURE — G8427 DOCREV CUR MEDS BY ELIG CLIN: HCPCS | Performed by: FAMILY MEDICINE

## 2021-11-23 PROCEDURE — 99214 OFFICE O/P EST MOD 30 MIN: CPT | Performed by: FAMILY MEDICINE

## 2021-11-23 RX ORDER — TRAMADOL HYDROCHLORIDE 50 MG/1
50 TABLET ORAL EVERY 6 HOURS PRN
Status: CANCELLED | OUTPATIENT
Start: 2021-11-23

## 2021-11-23 RX ORDER — PREGABALIN 50 MG/1
50 CAPSULE ORAL DAILY
Qty: 30 CAPSULE | Refills: 0 | Status: SHIPPED | OUTPATIENT
Start: 2021-11-23 | End: 2022-01-18

## 2021-11-23 RX ORDER — TRAMADOL HYDROCHLORIDE 50 MG/1
50 TABLET ORAL EVERY 6 HOURS PRN
COMMUNITY
End: 2021-12-21

## 2021-11-23 NOTE — PROGRESS NOTES
HPI:  Patient comes in today for   Chief Complaint   Patient presents with    Foot Pain     Patient is here for Right foot pain and numbness that started about a month ago after having gout. Patient reports feeling like ther is a lump on the bottom of his foot between the 2nd and 3rd toes. Here to f/u  On cellulitis legs has persistent  Pain right foot  feels like he is walking on stones and has numb feeling in the foot  His leg swelling is down no redness. Venous scan was negativeWas previously seen in the ER  few weeks for left leg swelling with redness was painful to walk,  was treated for cellulitis with keflex and doxycycline also his uric acid was slightly elevated. No chest pain  or SOB. Sunita Guevara Has  h/o crohn's disease with chronic abdominal pain nausea and emesis,has loose stools,no blood in stools . Phenergan helps with nausea and has been using it  chronically. Has established with Gastroenterology recently and has been recommended starting on Humira . Has seen pain management in past for chronic abdominal pain was referred back to pain management has not seen them .     HISTORY:  Past Medical History:   Diagnosis Date    Crohn's colitis [facility])        Past Surgical History:   Procedure Laterality Date    COLON SURGERY          Family History   Problem Relation Age of Onset    Hypertension Father     Crohn's Disease Maternal Aunt        Social History     Socioeconomic History    Marital status: Single     Spouse name: Not on file    Number of children: Not on file    Years of education: Not on file    Highest education level: Not on file   Occupational History    Not on file   Tobacco Use    Smoking status: Current Every Day Smoker     Packs/day: 0.50     Types: Cigarettes    Smokeless tobacco: Never Used   Vaping Use    Vaping Use: Never used   Substance and Sexual Activity    Alcohol use: Yes     Comment: socially    Drug use: Not Currently    Sexual activity: Not on file   Other Topics Concern    Not on file   Social History Narrative    Not on file     Social Determinants of Health     Financial Resource Strain:     Difficulty of Paying Living Expenses: Not on file   Food Insecurity:     Worried About Running Out of Food in the Last Year: Not on file    Melissa of Food in the Last Year: Not on file   Transportation Needs:     Lack of Transportation (Medical): Not on file    Lack of Transportation (Non-Medical): Not on file   Physical Activity:     Days of Exercise per Week: Not on file    Minutes of Exercise per Session: Not on file   Stress:     Feeling of Stress : Not on file   Social Connections:     Frequency of Communication with Friends and Family: Not on file    Frequency of Social Gatherings with Friends and Family: Not on file    Attends Druze Services: Not on file    Active Member of [address] [place] [address] [employer] or Organizations: Not on file    Attends Club or Organization Meetings: Not on file    Marital Status: Not on file   Intimate Partner Violence:     Fear of Current or Ex-Partner: Not on file    Emotionally Abused: Not on file    Physically Abused: Not on file    Sexually Abused: Not on file   Housing Stability:     Unable to Pay for Housing in the Last Year: Not on file    Number of [place] in the Last Year: Not on file    Unstable Housing in the Last Year: Not on file       Current Outpatient Medications   Medication Sig Dispense Refill    traMADol (ULTRAM) 50 MG tablet Take 50 mg by mouth every 6 hours as needed for Pain.       promethazine (PHENERGAN) 6.25 MG/5ML syrup Take 20 mLs by mouth 4 times daily as needed for Nausea 300 mL 0    colchicine (COLCRYS) 0.6 MG tablet Take 1 tablet by mouth daily 30 tablet 0    allopurinol (ZYLOPRIM) 100 MG tablet Take 1 tablet by mouth daily 30 tablet 3    cephALEXin (KEFLEX) 500 MG capsule Take 1 capsule by mouth 3 times daily 30 capsule 0    amLODIPine (NORVASC) 2.5 MG tablet Take 1 tablet by mouth daily 30 tablet 3    omeprazole (PRILOSEC) 40 MG delayed release capsule Take 1 capsule by mouth every morning (before breakfast) 30 capsule 2    diclofenac sodium (VOLTAREN) 1 % GEL Apply 4 g topically 3 times daily (Patient not taking: Reported on 11/23/2021) 100 g 0     No current facility-administered medications for this visit. No Known Allergies    REVIEW OF SYSTEMS:  General: No fevers, chills, change in weight  HEENT: No double vision, blurry vision, runny nose, sore throat, tinnitus  Cardio: No chest pain, palpitations, KIM, edema, PND  Pulmonary: No cough, hemoptysis, SOB  GI: Has nausea chronically ,no  vomiting, dysphagia, odynophagia,has  Diarrhea,no blood in stools  : No dysuria, hematuria, urgency, incontinence  Musculoskeletal: No muscle or joint aches, no joint swelling  Neuro: No dizziness/lightheadedness, no seizures  Endocrine: No polyuria, polydipsia, polyphagia, no temperature intolerance  Skin: No lesions or itching  No problems with ADLs  Sleep: Good  Psychiatric: No depression or anxiety    PHYSICAL EXAM:  VS:  /80 (Site: Left Upper Arm, Position: Sitting, Cuff Size: Large Adult)   Pulse 74   Temp 96.2 °F (35.7 °C) (Temporal)   Resp 14   Ht 6' 4\" (1.93 m)   Wt 258 lb (117 kg)   SpO2 99%   BMI 31.40 kg/m²   General:  Alert and oriented, NAD  HEENT:  TMs, NINI, EOMI, Conjunctivae clear       Throat currently clear. NECK:  Supple without adenopathy or thyromegaly, no carotid bruits  LUNGS:  CTA all fields  HEART:  RRR without M, R, or G  ABDOMEN:  Mildly obese,soft ,mildly  tender all over,  without palpable abnormalities  EXTREMITIES: Leg swelling is down ,no redness has peeling of skin in right foot . NEURO:  No focal deficits. SKIN:  warm to touch,normal texture. No active lesions. ASSESSMENT/PLAN:     Diagnosis Orders   1. Paresthesia of right leg  EMG    pregabalin (LYRICA) 50 MG capsule       No orders of the defined types were placed in this encounter.     Requested Prescriptions     Pending Prescriptions Disp Refills    traMADol (ULTRAM) 50 MG tablet       Sig: Take 1 tablet by mouth every 6 hours as needed for Pain. Will try Lyrica for parasthesia right foot. EMG study ordred  Continue Zyloprim . F/u with Gastroenerlogy  Patient had been referred to pain managment . No follow-ups on file.     Electronically signed by Jolly Boss MD

## 2021-12-02 ENCOUNTER — HOSPITAL ENCOUNTER (OUTPATIENT)
Dept: NEUROLOGY | Age: 31
Discharge: HOME | End: 2021-12-02
Payer: MEDICAID

## 2021-12-02 DIAGNOSIS — R20.2 PARESTHESIA OF RIGHT LEG: ICD-10-CM

## 2021-12-02 PROCEDURE — 95886 MUSC TEST DONE W/N TEST COMP: CPT

## 2021-12-02 PROCEDURE — 95909 NRV CNDJ TST 5-6 STUDIES: CPT

## 2021-12-02 NOTE — PROCEDURES
EMG Nerve Conduction Study    Patient Name: Evaristo Marie   MRN: [id number]  Date of Procedure: 12/2/2021    Procedure: Right Lower Extremity EMG        EMG Summary Table      EMG Summary Table                                                                                                                                                                                                   Spontaneous MUAP Recruitment    IA Fib PSW Fasc H.F. Amp Dur. PPP Pattern   R. TIBIALIS ANTERIOR N None None None None N N N N   R. GASTROC. MEDIALIS N None None None None N N N N   R. VASTUS MEDIALIS N None None None None N N N N                           R. LUMBAR PSP (U) N None None None None       R. LUMBAR PSP (M) N None None None None       R. LUMBAR PSP (L) N None None None None                   R. Adductor Randall N None None None None       R. Tibialis Posterior N None None None None       R. Extensor Dig Longus N None None None None       R. Extensor Hallicus Longus N None None None None       R.  Flexor Hallicus N None None None None Poor tolerance       N=Negative  NT= Not Tested      Electronically signed by Rohan Jung MD on 12/2/2021 at 3:36 PM

## 2021-12-02 NOTE — PROCEDURES
Flower Thompson is a 28 y.o. male patient. 1. Paresthesia of right leg      Past Medical History:   Diagnosis Date    Crohn's colitis ([address]      There were no vitals taken for this visit. Procedures   Electromyography/ Nerve Conduction Study  (EMG/NCV)    Patient Name: Flower Thompson   MRN: [id number]  Date of Procedure: 12/2/2021 . Procedure:  Right Lower Extremity EMG /NCV    Chief Complaint[de-identified]    Flower Thompson is a 28 y.o.  male  Referred  By Susan Muñoz MD   for electrodiagnostic medicine consultation. Patient complains of . Patient has pain and numbness and lack of feeling in Right lower extremity entire foot   To  Knee no thigh pain numbness  Has some  B   This been going on for about 1 years without any specific injury. Patient has abnormal  sensation in the  Right lower  Leg . Pain radiates proximally . There is not history of fractures    Patient does not have Diabetes Mellitus. Patient does not have neck pain    There are new XRays  Imaging:      EXAMINATION:   XRAY VIEWS OF THE LEFT TIBIA AND FIBULA       10/24/2021 12:13 pm       COMPARISON:   None.       HISTORY:   ORDERING SYSTEM PROVIDED HISTORY: lower leg redness and swelling   TECHNOLOGIST PROVIDED HISTORY:   lower leg redness and swelling   Reason for Exam: tripped on shower chair the other day leg pain now   Acuity: Unknown   Type of Exam: Initial   Mechanism of Injury: tripping       FINDINGS:   There is no evidence of acute fracture.  There is normal alignment.  No acute   joint abnormality.  No focal osseous lesion.  No focal soft tissue abnormality.          EXAMINATION:    XRAY VIEWS OF THE LEFT TIBIA AND FIBULA       10/24/2021 12:13 pm       COMPARISON:   None.       HISTORY:   ORDERING SYSTEM PROVIDED HISTORY: lower leg redness and swelling   TECHNOLOGIST PROVIDED HISTORY:   lower leg redness and swelling   Reason for Exam: tripped on shower chair the other day leg pain now   Acuity: Unknown   Type of Exam: Initial   Mechanism of Injury: tripping       FINDINGS:   There is no evidence of acute fracture.  There is normal alignment.  No acute   joint abnormality.  No focal osseous lesion. No focal soft tissue abnormality.           Impression   No acute osseous abnormality. Impression           Pain Scale:  Pain and numbness reported as 6 out of 10. His present pain began 1 year ago. Pain has not changed since onset. Pain is associated with:walking    Patient does report numbness. Patient does report tingling. There is  associated weakness. Difficulty controlling bowels: Yes. Difficulty controlling bladder: Yes. Electrical shock sensation in her legs: occasional .  Difficulty with balance while standing or walking: Yes. Patient reports that nothing help to alleviate the pain. The following changes pain for the worse: walking .     The following treatment has been tried:  Physical therapy: No.   Chiropractic treatment: No.  Epidural steroid injection/block: No.   Prescription medications: No.   Over-the-counter medications: No.      Past Medical History:  Past Medical History:   Diagnosis Date    [facility] ([facility]         Past Surgical History  Past Surgical History:   Procedure Laterality Date    COLON SURGERY           Family Medical History :  Family History   Problem Relation Age of Onset    Hypertension Father     Crohn's Disease Maternal Aunt          Social History   Social History     Socioeconomic History    Marital status: Single     Spouse name: Not on file    Number of children: Not on file    Years of education: Not on file    Highest education level: Not on file   Occupational History    Not on file   Tobacco Use    Smoking status: Current Every Day Smoker     Packs/day: 0.50     Types: Cigarettes    Smokeless tobacco: Never Used   Vaping Use    Vaping Use: Never used   Substance and Sexual Activity    Alcohol use: Yes     Comment: socially    Drug use: Not Currently  Sexual activity: Not on file   Other Topics Concern    Not on file   Social History Narrative    Not on file     Social Determinants of Health     Financial Resource Strain:     Difficulty of Paying Living Expenses: Not on file   Food Insecurity:     Worried About Running Out of Food in the Last Year: Not on file    Melissa of Food in the Last Year: Not on file   Transportation Needs:     Lack of Transportation (Medical): Not on file    Lack of Transportation (Non-Medical): Not on file   Physical Activity:     Days of Exercise per Week: Not on file    Minutes of Exercise per Session: Not on file   Stress:     Feeling of Stress : Not on file   Social Connections:     Frequency of Communication with Friends and Family: Not on file    Frequency of Social Gatherings with Friends and Family: Not on file    Attends Restorationist Services: Not on file    Active Member of [address] [place] [address] [employer] or Organizations: Not on file    Attends Club or Organization Meetings: Not on file    Marital Status: Not on file   Intimate Partner Violence:     Fear of Current or Ex-Partner: Not on file    Emotionally Abused: Not on file    Physically Abused: Not on file    Sexually Abused: Not on file   Housing Stability:     Unable to Pay for Housing in the Last Year: Not on file    Number of [place] in the Last Year: Not on file    Unstable Housing in the Last Year: Not on file         Review of Systems  No Known Allergies  Current Outpatient Medications on File Prior to Encounter   Medication Sig Dispense Refill    traMADol (ULTRAM) 50 MG tablet Take 50 mg by mouth every 6 hours as needed for Pain.  pregabalin (LYRICA) 50 MG capsule Take 1 capsule by mouth daily for 30 days.  30 capsule 0    promethazine (PHENERGAN) 6.25 MG/5ML syrup Take 20 mLs by mouth 4 times daily as needed for Nausea 300 mL 0    colchicine (COLCRYS) 0.6 MG tablet Take 1 tablet by mouth daily 30 tablet 0    allopurinol (ZYLOPRIM) 100 MG tablet Take 1 tablet by mouth daily 30 tablet 3    cephALEXin (KEFLEX) 500 MG capsule Take 1 capsule by mouth 3 times daily 30 capsule 0    diclofenac sodium (VOLTAREN) 1 % GEL Apply 4 g topically 3 times daily (Patient not taking: Reported on 11/23/2021) 100 g 0    amLODIPine (NORVASC) 2.5 MG tablet Take 1 tablet by mouth daily 30 tablet 3    omeprazole (PRILOSEC) 40 MG delayed release capsule Take 1 capsule by mouth every morning (before breakfast) 30 capsule 2     No current facility-administered medications on file prior to encounter. Constitutional- no chills fever fatigue  HENT: no congestion   Eyes: no discharge itching  Respiratory: Negative for choking chest tightness, short of breath  Gastrointestinal: no nausea vomiting  Abdominal pain  Musculoskeletal: negative for arthralgias back pain currently  Behavioral: mild anxiety    Physical Exam:   General Appearance:  alert, well appearing, and in no acute distress  Mental status: oriented to person, place, and time with normal affect  Head:  normocephalic, atraumatic. Eye: no icterus, redness, pupils equal and reactive, extraocular eye movements intact, conjunctiva clear  Ear: normal external ear, no discharge, hearing intact  Nose:  no drainage noted  Mouth: mucous membranes moist  Neck: supple, no carotid bruits, thyroid not palpable  Lungs: Bilateral equal air entry, clear to ausculation, no wheezing, rales or rhonchi, normal effort  Cardiovascular: normal rate, regular rhythm, no murmur, gallop, rub.   Abdomen: Soft, nontender, nondistended, normal bowel sounds, no hepatomegaly or splenomegaly  Neurologic: normal muscle tone and bulk, sensory deficits     Stocking glove decrease sensation   To upper calf  Right , normal speech, cranial nerves II through XII grossly intact  Musculoskeletal:  No bony deformitiestender B lumbar  - slr    Skin: No gross lesions, rashes, bruising or bleeding on exposed skin area  Extremities:  peripheral pulses palpable, no pedal edema or calf pain with palpation  Psych: normal affect     EMG/NCV Findings: 1) Right sural sensory nerve Conduction No Response                                     2)Right peroneal motor  No response                                     3) Right  Peroneal F Wave No Response                                    4) Right Tibial F Wave   No  Response                                     5) Right H Reflex  No Response   No EMG  Evidence of       NO  Right  Lumbosacral Radiculopathy  NO  Right Sciatic Neuropathy  NO sacral plexopathy                                                                                       Impression: Unclear  If No Responses are normal  Variants  Or technical patient cannot tolerate  Nerve stimulations                -Consistent  with Peripheral Neuropathy  Motor and Sensory      TABLES      Findings of Nerve Conductions: Please see NCV table      Impression:  1 ) Right  Peripheral Neuropathy                         2) Lumbar Pain                        3) Gout- persistent                         4) Crohn's Disease                        5) History   Bowel resection                        6) Recurrent Distal  Right Leg Cellulitis                        7) ? B12? deficiency related to ileum resection                            Plan:  B 12 complex              Consider Amitryptiine 75 mg   Po HS as can't tolerate Lyrica and probably not Neurontin               Patient can consider  Humira for Crohn's  Though  Difficult side effects with Remicade                May help with peripheral  Extremities symptoms              Not strong evidence for radiculopathy   But could consider Lumbar MRI order              Consider PT                 Electronically signed by Francesco Dumont MD on 12/2/2021 at 3:11 PM   Francesco Dumont MD  12/2/2021

## 2021-12-02 NOTE — PROGRESS NOTES
EMG/NCS Bilateral    lower Completed    PCP: Lisbeth Burkitt, MD    Ordering: Beverley Christensen    Interpreting Physician: Konstantin Sparrow MD    Technician: Yany Salamanca RN

## 2021-12-02 NOTE — PROGRESS NOTES
EMG/NCS Right    lower Completed    PCP: Susan Muñoz MD    Ordering: Susan Muñoz MD    Interpreting Physician:Devan Ruiz MD    Technician: Maine Aparicio RN

## 2021-12-06 ENCOUNTER — TELEPHONE (OUTPATIENT)
Dept: FAMILY MEDICINE CLINIC | Age: 31
End: 2021-12-06

## 2021-12-06 ENCOUNTER — OFFICE VISIT (OUTPATIENT)
Dept: FAMILY MEDICINE CLINIC | Age: 31
End: 2021-12-06
Payer: MEDICAID

## 2021-12-06 VITALS
BODY MASS INDEX: 36.84 KG/M2 | HEIGHT: 72 IN | WEIGHT: 272 LBS | DIASTOLIC BLOOD PRESSURE: 70 MMHG | HEART RATE: 80 BPM | SYSTOLIC BLOOD PRESSURE: 122 MMHG

## 2021-12-06 DIAGNOSIS — I10 HYPERTENSION, UNSPECIFIED TYPE: ICD-10-CM

## 2021-12-06 DIAGNOSIS — E79.0 HYPERURICEMIA: ICD-10-CM

## 2021-12-06 DIAGNOSIS — G89.29 CHRONIC ABDOMINAL PAIN: ICD-10-CM

## 2021-12-06 DIAGNOSIS — R10.9 CHRONIC ABDOMINAL PAIN: ICD-10-CM

## 2021-12-06 DIAGNOSIS — R20.2 PARESTHESIA OF RIGHT LEG: Primary | ICD-10-CM

## 2021-12-06 PROCEDURE — 99213 OFFICE O/P EST LOW 20 MIN: CPT | Performed by: FAMILY MEDICINE

## 2021-12-06 PROCEDURE — G8417 CALC BMI ABV UP PARAM F/U: HCPCS | Performed by: FAMILY MEDICINE

## 2021-12-06 PROCEDURE — 4004F PT TOBACCO SCREEN RCVD TLK: CPT | Performed by: FAMILY MEDICINE

## 2021-12-06 PROCEDURE — G8484 FLU IMMUNIZE NO ADMIN: HCPCS | Performed by: FAMILY MEDICINE

## 2021-12-06 PROCEDURE — 99214 OFFICE O/P EST MOD 30 MIN: CPT | Performed by: FAMILY MEDICINE

## 2021-12-06 PROCEDURE — G8427 DOCREV CUR MEDS BY ELIG CLIN: HCPCS | Performed by: FAMILY MEDICINE

## 2021-12-06 RX ORDER — GABAPENTIN 100 MG/1
100 CAPSULE ORAL 2 TIMES DAILY
Qty: 60 CAPSULE | Refills: 1 | Status: SHIPPED | OUTPATIENT
Start: 2021-12-06 | End: 2022-01-18

## 2021-12-06 RX ORDER — AMLODIPINE BESYLATE 2.5 MG/1
2.5 TABLET ORAL DAILY
Qty: 90 TABLET | Refills: 1 | Status: SHIPPED | OUTPATIENT
Start: 2021-12-06 | End: 2022-01-19 | Stop reason: SDUPTHER

## 2021-12-06 SDOH — ECONOMIC STABILITY: FOOD INSECURITY: WITHIN THE PAST 12 MONTHS, THE FOOD YOU BOUGHT JUST DIDN'T LAST AND YOU DIDN'T HAVE MONEY TO GET MORE.: NEVER TRUE

## 2021-12-06 SDOH — ECONOMIC STABILITY: TRANSPORTATION INSECURITY
IN THE PAST 12 MONTHS, HAS THE LACK OF TRANSPORTATION KEPT YOU FROM MEDICAL APPOINTMENTS OR FROM GETTING MEDICATIONS?: NO

## 2021-12-06 SDOH — ECONOMIC STABILITY: TRANSPORTATION INSECURITY
IN THE PAST 12 MONTHS, HAS LACK OF TRANSPORTATION KEPT YOU FROM MEETINGS, WORK, OR FROM GETTING THINGS NEEDED FOR DAILY LIVING?: NO

## 2021-12-06 SDOH — ECONOMIC STABILITY: FOOD INSECURITY: WITHIN THE PAST 12 MONTHS, YOU WORRIED THAT YOUR FOOD WOULD RUN OUT BEFORE YOU GOT MONEY TO BUY MORE.: NEVER TRUE

## 2021-12-06 ASSESSMENT — SOCIAL DETERMINANTS OF HEALTH (SDOH): HOW HARD IS IT FOR YOU TO PAY FOR THE VERY BASICS LIKE FOOD, HOUSING, MEDICAL CARE, AND HEATING?: NOT HARD AT ALL

## 2021-12-06 NOTE — PROGRESS NOTES
HPI:  Patient comes in today for   Chief Complaint   Patient presents with    Leg Pain     right foot and leg numb   Here to f/u on parasthesia right leg has had EMG/NC study done last week report pending . states bowels or bladder dysfunction. Pain right foot  feels like he is walking on stones and has numb feeling in the foot  His leg swelling is down no redness. Venous scan was negativeWas previously seen in the ER  few weeks for left leg swelling with redness was painful to walk,  was treated for cellulitis with keflex and doxycycline also his uric acid was slightly elevated was started on Zyloprim. No chest pain  or SOB. Neo Haskins Has  h/o crohn's disease with chronic abdominal pain nausea and emesis,has loose stools on and off,no blood in stools . Phenergan helps with nausea and has been using it  chronically. Has established with Gastroenterology recently and has been recommended starting on Humira has not followed up since . Has seen pain management in past for chronic abdominal pain was referred back to pain management however had missed his appointment in past so was not seen .     HISTORY:  Past Medical History:   Diagnosis Date    Crohn's colitis [facility])        Past Surgical History:   Procedure Laterality Date    COLON SURGERY      HERNIA REPAIR          Family History   Problem Relation Age of Onset    Hypertension Father     Crohn's Disease Maternal Aunt        Social History     Socioeconomic History    Marital status: Single     Spouse name: Not on file    Number of children: Not on file    Years of education: Not on file    Highest education level: Not on file   Occupational History    Not on file   Tobacco Use    Smoking status: Current Every Day Smoker     Packs/day: 0.50     Types: Cigarettes    Smokeless tobacco: Never Used   Vaping Use    Vaping Use: Never used   Substance and Sexual Activity    Alcohol use: Yes     Comment: socially    Drug use: Not Currently    Sexual activity: Not on file Other Topics Concern    Not on file   Social History Narrative    Not on file     Social Determinants of Health     Financial Resource Strain: Low Risk     Difficulty of Paying Living Expenses: Not hard at all   Food Insecurity: No Food Insecurity    Worried About Running Out of Food in the Last Year: Never true    [address] in the Last Year: Never true   Transportation Needs: No Transportation Needs    Lack of Transportation (Medical): No    Lack of Transportation (Non-Medical): No   Physical Activity:     Days of Exercise per Week: Not on file    Minutes of Exercise per Session: Not on file   Stress:     Feeling of Stress : Not on file   Social Connections:     Frequency of Communication with Friends and Family: Not on file    Frequency of Social Gatherings with Friends and Family: Not on file    Attends Mormonism Services: Not on file    Active Member of [address] [place] [address] [employer] or Organizations: Not on file    Attends Club or Organization Meetings: Not on file    Marital Status: Not on file   Intimate Partner Violence:     Fear of Current or Ex-Partner: Not on file    Emotionally Abused: Not on file    Physically Abused: Not on file    Sexually Abused: Not on file   Housing Stability:     Unable to Pay for Housing in the Last Year: Not on file    Number of [place] in the Last Year: Not on file    Unstable Housing in the Last Year: Not on file       Current Outpatient Medications   Medication Sig Dispense Refill    pregabalin (LYRICA) 50 MG capsule Take 1 capsule by mouth daily for 30 days.  (Patient taking differently: Take 100 mg by mouth daily. ) 30 capsule 0    promethazine (PHENERGAN) 6.25 MG/5ML syrup Take 20 mLs by mouth 4 times daily as needed for Nausea 300 mL 0    allopurinol (ZYLOPRIM) 100 MG tablet Take 1 tablet by mouth daily 30 tablet 3    diclofenac sodium (VOLTAREN) 1 % GEL Apply 4 g topically 3 times daily 100 g 0    amLODIPine (NORVASC) 2.5 MG tablet Take 1 tablet by mouth daily 30 tablet 3    omeprazole (PRILOSEC) 40 MG delayed release capsule Take 1 capsule by mouth every morning (before breakfast) 30 capsule 2    traMADol (ULTRAM) 50 MG tablet Take 50 mg by mouth every 6 hours as needed for Pain. (Patient not taking: Reported on 12/6/2021)      colchicine (COLCRYS) 0.6 MG tablet Take 1 tablet by mouth daily (Patient not taking: Reported on 12/6/2021) 30 tablet 0    cephALEXin (KEFLEX) 500 MG capsule Take 1 capsule by mouth 3 times daily 30 capsule 0     No current facility-administered medications for this visit. No Known Allergies    REVIEW OF SYSTEMS:  General: No fevers, chills, change in weight  HEENT: No double vision, blurry vision, runny nose, sore throat, tinnitus  Cardio: No chest pain, palpitations, KIM, edema, PND  Pulmonary: No cough, hemoptysis, SOB  GI: Has nausea chronically ,no  vomiting, dysphagia, odynophagia,has  Diarrhea,no blood in stools  : No dysuria, hematuria, urgency, incontinence  Musculoskeletal: No muscle or joint aches, no joint swelling  Neuro: No dizziness/lightheadedness, no seizures  Endocrine: No polyuria, polydipsia, polyphagia, no temperature intolerance  Skin: No lesions or itching  No problems with ADLs  Sleep: Good  Psychiatric: No depression or anxiety    PHYSICAL EXAM:  VS:  /70 (Site: Left Upper Arm, Position: Sitting, Cuff Size: Large Adult)   Pulse 80   Ht 6' (1.829 m)   Wt 272 lb (123.4 kg)   BMI 36.89 kg/m²   General:  Alert and oriented, NAD  HEENT:  TMs, NINI, EOMI, Conjunctivae clear       Throat currently clear. NECK:  Supple without adenopathy or thyromegaly, no carotid bruits  LUNGS:  CTA all fields  HEART:  RRR without M, R, or G  ABDOMEN:  Mildly obese,soft ,mildly  tender all over,  without palpable abnormalities  EXTREMITIES: Leg swelling is down ,no redness has peeling of skin in right foot . NEURO:  No focal deficits. SKIN:  warm to touch,normal texture. No active lesions.     ASSESSMENT/PLAN: Diagnosis Orders   1. Paresthesia of right leg  Maria A Dong MD, Pain Management, [place]   2. Chronic abdominal pain  Maria A Dong MD, Pain Management, [place]   3. Hypertension, unspecified type  amLODIPine (NORVASC) 2.5 MG tablet   4. Hyperuricemia         Orders Placed This Encounter   Procedures   Jeffy Hutton MD, Pain Management, [place]     Referral Priority:   Routine     Referral Type:   Eval and Treat     Referral Reason:   Specialty Services Required     Referred to Provider:   Kelsey Fang MD     Requested Specialty:   Physical Medicine and Rehab     Number of Visits Requested:   1     Requested Prescriptions     Signed Prescriptions Disp Refills    gabapentin (NEURONTIN) 100 MG capsule 60 capsule 1     Sig: Take 1 capsule by mouth 2 times daily for 60 days. Intended supply: 30 days    amLODIPine (NORVASC) 2.5 MG tablet 90 tablet 1     Sig: Take 1 tablet by mouth daily   Patient wants to try gabapentin instead of lyrica so will switch. EMG report pending  Continue Zyloprim . F/u with Gastroenerlogy  Will make  A new referral for pain management    Return in about 3 months (around 3/6/2022).     Electronically signed by Dee Dee Evans MD

## 2021-12-06 NOTE — TELEPHONE ENCOUNTER
Patient cannot be seen at [facility] pain [facility]. Referral to be placed for external pain [facility].

## 2021-12-08 DIAGNOSIS — R11.0 NAUSEA: ICD-10-CM

## 2021-12-08 RX ORDER — OMEPRAZOLE 40 MG/1
40 CAPSULE, DELAYED RELEASE ORAL
Qty: 30 CAPSULE | Refills: 2 | Status: SHIPPED | OUTPATIENT
Start: 2021-12-08 | End: 2022-01-18

## 2021-12-09 RX ORDER — PROMETHAZINE HYDROCHLORIDE 6.25 MG/5ML
25 SYRUP ORAL 4 TIMES DAILY PRN
Qty: 300 ML | Refills: 2 | Status: SHIPPED | OUTPATIENT
Start: 2021-12-09 | End: 2022-01-19 | Stop reason: SDUPTHER

## 2021-12-09 NOTE — TELEPHONE ENCOUNTER
Errol Russell is requesting a refill on the following medication(s):  Requested Prescriptions     Pending Prescriptions Disp Refills    promethazine (PHENERGAN) 6.25 MG/5ML syrup 300 mL 0     Sig: Take 20 mLs by mouth 4 times daily as needed for Nausea       Last Visit Date (If Applicable):  91/7/0514    Next Visit Date:    3/7/2022

## 2021-12-13 ENCOUNTER — HOSPITAL ENCOUNTER (OUTPATIENT)
Dept: GENERAL RADIOLOGY | Age: 31
Discharge: HOME | End: 2021-12-15
Payer: MEDICAID

## 2021-12-13 ENCOUNTER — OFFICE VISIT (OUTPATIENT)
Dept: FAMILY MEDICINE CLINIC | Age: 31
End: 2021-12-13
Payer: MEDICAID

## 2021-12-13 VITALS
WEIGHT: 271 LBS | HEIGHT: 76 IN | TEMPERATURE: 97.3 F | OXYGEN SATURATION: 100 % | SYSTOLIC BLOOD PRESSURE: 130 MMHG | RESPIRATION RATE: 18 BRPM | DIASTOLIC BLOOD PRESSURE: 82 MMHG | HEART RATE: 87 BPM | BODY MASS INDEX: 33 KG/M2

## 2021-12-13 DIAGNOSIS — M79.671 RIGHT FOOT PAIN: ICD-10-CM

## 2021-12-13 DIAGNOSIS — M79.671 RIGHT FOOT PAIN: Primary | ICD-10-CM

## 2021-12-13 PROCEDURE — 4004F PT TOBACCO SCREEN RCVD TLK: CPT | Performed by: FAMILY MEDICINE

## 2021-12-13 PROCEDURE — 99212 OFFICE O/P EST SF 10 MIN: CPT | Performed by: FAMILY MEDICINE

## 2021-12-13 PROCEDURE — G8427 DOCREV CUR MEDS BY ELIG CLIN: HCPCS | Performed by: FAMILY MEDICINE

## 2021-12-13 PROCEDURE — 99213 OFFICE O/P EST LOW 20 MIN: CPT | Performed by: FAMILY MEDICINE

## 2021-12-13 PROCEDURE — 73630 X-RAY EXAM OF FOOT: CPT

## 2021-12-13 PROCEDURE — G8417 CALC BMI ABV UP PARAM F/U: HCPCS | Performed by: FAMILY MEDICINE

## 2021-12-13 PROCEDURE — G8484 FLU IMMUNIZE NO ADMIN: HCPCS | Performed by: FAMILY MEDICINE

## 2021-12-13 NOTE — PROGRESS NOTES
HPI:  Patient comes in today for   Chief Complaint   Patient presents with    Pain     Patient is here for right foot pain that he has been dealing with for the last 1.5 months. Wants to talk about Jovel Neuroma. Patient with persistence of right foot pain feels like walking on stones. Is on Lyrica for parasthesia right leg had had EMG done report pending. H/o Crohns disease and chronic nausea seeing gastroenterogy. REVIEW OF SYSTEMS:  Cardio: No chest pain, palpitations, KIM, edema, PND  Pulmonary: No cough, hemoptysis, SOB  GI: No nausea, vomiting, dysphagia, odynophagia, diarrhea,constipation  : No dysuria, hematuria, urgency, incontinence  Past Medical History:   Diagnosis Date    [facility]        Current Outpatient Medications   Medication Sig Dispense Refill    promethazine (PHENERGAN) 6.25 MG/5ML syrup Take 20 mLs by mouth 4 times daily as needed for Nausea 300 mL 2    omeprazole (PRILOSEC) 40 MG delayed release capsule Take 1 capsule by mouth every morning (before breakfast) 30 capsule 2    amLODIPine (NORVASC) 2.5 MG tablet Take 1 tablet by mouth daily 90 tablet 1    gabapentin (NEURONTIN) 100 MG capsule Take 1 capsule by mouth 2 times daily for 60 days. Intended supply: 30 days (Patient not taking: Reported on 12/13/2021) 60 capsule 1    traMADol (ULTRAM) 50 MG tablet Take 50 mg by mouth every 6 hours as needed for Pain. (Patient not taking: Reported on 12/6/2021)      pregabalin (LYRICA) 50 MG capsule Take 1 capsule by mouth daily for 30 days.  (Patient not taking: Reported on 12/13/2021) 30 capsule 0    colchicine (COLCRYS) 0.6 MG tablet Take 1 tablet by mouth daily (Patient not taking: Reported on 12/6/2021) 30 tablet 0    allopurinol (ZYLOPRIM) 100 MG tablet Take 1 tablet by mouth daily (Patient not taking: Reported on 12/13/2021) 30 tablet 3    cephALEXin (KEFLEX) 500 MG capsule Take 1 capsule by mouth 3 times daily (Patient not taking: Reported on 12/13/2021) 30 capsule 0    diclofenac sodium (VOLTAREN) 1 % GEL Apply 4 g topically 3 times daily (Patient not taking: Reported on 12/13/2021) 100 g 0     No current facility-administered medications for this visit. No Known Allergies    PHYSICAL EXAM:  VS:  /82 (Site: Left Upper Arm, Position: Sitting, Cuff Size: Medium Adult)   Pulse 87   Temp 97.3 °F (36.3 °C) (Temporal)   Resp 18   Ht 6' 4\" (1.93 m)   Wt 271 lb (122.9 kg)   SpO2 100%   BMI 32.99 kg/m²   General:  Alert and oriented, NAD  HEENT:  TMs, NINI, EOMI, Conjunctivae clear,Throat currently clear. NECK:  Supple without adenopathy or thyromegaly, no carotid bruits  LUNGS:  CTA all fields  HEART:  RRR without Murmur  ABDOMEN:  Soft and nontender without palpable abnormalities  EXTREMITIES:Right foot no swelling or redness has dry skin no tender spots  No  legedema, no calf tenderness    ASSESSMENT/PLAN:     Diagnosis Orders   1. Right foot pain  [facility], Vannessa Stinson DPM, Podiatry, [place]    XR FOOT RIGHT (2 VIEWS)       Orders Placed This Encounter   Procedures    XR FOOT RIGHT (2 VIEWS)     Standing Status:   Future     Standing Expiration Date:   12/13/2022   [facility] - Charlette Carey DPM, Podiatry, [place]     Referral Priority:   Routine     Referral Type:   Eval and Treat     Referral Reason:   Specialty Services Required     Referred to Provider:   Raeann Cat DPM     Requested Specialty:   Podiatry     Number of Visits Requested:   1     Requested Prescriptions      No prescriptions requested or ordered in this encounter   Xray right foot. Podiatry consult    Return if symptoms worsen or fail to improve.     Electronically signed by Susan Muñoz MD

## 2021-12-21 ENCOUNTER — OFFICE VISIT (OUTPATIENT)
Dept: PODIATRY | Age: 31
End: 2021-12-21
Payer: MEDICAID

## 2021-12-21 VITALS
HEIGHT: 76 IN | WEIGHT: 277 LBS | DIASTOLIC BLOOD PRESSURE: 78 MMHG | HEART RATE: 82 BPM | BODY MASS INDEX: 33.73 KG/M2 | SYSTOLIC BLOOD PRESSURE: 136 MMHG

## 2021-12-21 DIAGNOSIS — R20.2 PARESTHESIA OF RIGHT LEG: Primary | ICD-10-CM

## 2021-12-21 DIAGNOSIS — R60.0 EDEMA OF LOWER EXTREMITY: ICD-10-CM

## 2021-12-21 PROCEDURE — G8417 CALC BMI ABV UP PARAM F/U: HCPCS | Performed by: PODIATRIST

## 2021-12-21 PROCEDURE — G8484 FLU IMMUNIZE NO ADMIN: HCPCS | Performed by: PODIATRIST

## 2021-12-21 PROCEDURE — G8427 DOCREV CUR MEDS BY ELIG CLIN: HCPCS | Performed by: PODIATRIST

## 2021-12-21 PROCEDURE — 99214 OFFICE O/P EST MOD 30 MIN: CPT | Performed by: PODIATRIST

## 2021-12-21 PROCEDURE — 4004F PT TOBACCO SCREEN RCVD TLK: CPT | Performed by: PODIATRIST

## 2021-12-21 PROCEDURE — 99204 OFFICE O/P NEW MOD 45 MIN: CPT | Performed by: PODIATRIST

## 2022-01-01 NOTE — ED PROVIDER NOTES
Neonatology Daily Progress Note      Reason for Consult:   Patient Active Problem List   Diagnosis   • Single liveborn, born in hospital, delivered by vaginal delivery   • Meconium in amniotic fluid noted in labor/delivery, liveborn infant   • LGA (large for gestational age) infant   • Respiratory distress   • At risk for sepsis in    • Meconium aspiration syndrome       Overnight events: Admitted for low SpO2 for HFNC most likely sec to MAS.  : now on 2lpm in 30%  and advancing feeds  : in RA with intermittent tachypnea      Subjective :    Michael Atkinson is an Inborn   Term  4250 g male infant admitted 2022 10:23 PM at Gestational Age: 40w6d now at age: 3 day old and whose birthday is 2021.     Corrected Gestational Age: 41w2d      Respiratory Settings Last Value   Nasal Cannula Flow 2 L/min (22 1300)    Mode     Rate     Peak Inspiratory Pressure     Tidal Volume     Inspiratory time     Pressure Support     PEEP/CPAC/EPAP     FiO2 21 % (22 1300)   Mean     Delta P     Hertz       Last Apnea:  ;    Intervention:      Last Bradycardia: length each event lasted (in sec) over the past 24 hours:  ;    Interventions:        Urine:  No data recorded    Last Stool: 1 (22 0043)    Transcutaneous Bilirubin  TCB Result: 14.6 (22 0300)  TCB Site: Chest   Hours of age-Transcutaneous Biliribin: 52.5 Hrs    Labs (Last 24 hours)  Recent Results (from the past 24 hour(s))   GLUCOSE, BEDSIDE - POINT OF CARE    Collection Time: 22  6:21 PM   Result Value Ref Range    GLUCOSE, BEDSIDE - POINT OF CARE 87 47 - 110 mg/dL   GLUCOSE, BEDSIDE - POINT OF CARE    Collection Time: 22  5:56 AM   Result Value Ref Range    GLUCOSE, BEDSIDE - POINT OF CARE 81 47 - 110 mg/dL   Bilirubin, Total    Collection Time: 22  6:00 AM   Result Value Ref Range    Bilirubin, Total 11.8 (HH) 2.0 - 7.0 mg/dL        Labs above reviewed.    Medications  Current  [address] [facility] ED  [address]  [place] [address] [place] [address]  Phone: [phone]      Pt Name: Maranda Mathew  [facility]:[id number]  Birthdate 1990  Date of evaluation: 4/17/2021      CHIEF COMPLAINT       Chief Complaint   Patient presents with    Dental Pain     right side        HISTORY OF PRESENT ILLNESS   Maranda Mathew is a 35 y.o. male who presents for evaluation of dental pain. The patient reports that starting 2 days ago he developed gradual onset, constant, progressive, sharp, stabbing, nonradiating pain to his right upper and right lower molars. He denies any specific trauma or dental fracture. He has been taking Tylenol and Excedrin without improvement. He states that he cannot take NSAIDs secondary to his Crohn's disease. The patient called his dentist but cannot get an appointment until May. His pain is worse with chewing. He does not list any other provoking or palliating factors. The patient denies fever, chills, headache, vision changes, neck pain, back pain, chest pain, shortness of breath, nausea, vomiting, urinary/bowel symptoms, focal weakness, numbness, tingling, abdominal pain, recent injury or illness. REVIEW OF SYSTEMS     Ten point review of systems was reviewed and is negative unless otherwise noted in the HPI    [address]    has a past medical history of Crohn's colitis ([facility] [address] SURGICAL HISTORY      has a past surgical history that includes Colon surgery. CURRENT MEDICATIONS       Previous Medications    DICYCLOMINE (BENTYL) 10 MG CAPSULE    Take 1 capsule by mouth every 6 hours as needed (cramps)    ONDANSETRON (ZOFRAN ODT) 4 MG DISINTEGRATING TABLET    Take 1 tablet by mouth every 8 hours as needed for Nausea       ALLERGIES     has No Known Allergies. FAMILY HISTORY     has no family status information on file. family history is not on file. SOCIAL HISTORY      reports that he has been smoking cigarettes.  He has been smoking about 0.50 packs Facility-Administered Medications   Medication   • sodium chloride (PF) 0.9 % injection 0.5-1 mL    And   • sodium chloride (PF) 0.9 % injection 0.5-1 mL   • dextrose 10 % infusion   • dextrose (GLUTOSE) 0.4 g/mL (40%) gel 2.25 mL        Physical Exam  Vitals signs reviewed.     The patient was examined by Neonatologist 2022      Physical Exam        Birth Measurements: Weight: 9 lb 5.9 oz (4250 g)        Length: 22.5\"        Head circumference: 36.5 cm     Physical Exam   GENERAL:Baby Boy is an alert, quiet male in mild resp. distress.  SKIN: His skin is warm with normal turgor. The color of the skin is pink. There is no rash. There are no bruises or other signs of injury.  Significant jaundice is not present.  HEAD: The head is atraumatic and normocephalic. The anterior fontanel is open and flat.  EYES: The conjunctivae appear normal with neither icterus nor subconjunctival hemorrhage.   Red reflexes are seen bilaterally by FU on 2022  EARS: Pinnae normal.  NOSE: There is no nasal flaring, nares patent bilaterally.  THROAT:  The oropharynx is normal.  There is no cleft of the palate.  NECK: Clavicles without crepitus.  TRUNK AND THORAX: There are no lesions on the trunk; there is no dimple over the presacral area. There are no retractions. No tenderness or abnormality felt over ribs in the back.   LUNGS: The lungs have fair air exchange. Mild intermittent  tachypnea with a minimal Sc retractions.  HEART: The precordium is quiet. The heart rhythm is grossly regular. S1 and S2 are normal. There are no murmurs. Normal femoral pulses.  ABDOMEN: The umbilical cord stump is normal. There is not an umbilical hernia. The abdomen is flat and soft.   GENITALIA: normal male genitalia with bilateral descended testes  RECTAL: anus patent  EXTREMITIES: Moving all 4 extremities. The hip exam is normal  . There are no hip clicks or clunks.    NEUROLOGIC: He displays normal tone throughout. He is not jittery.        Assessment & Plan     Term  male infant at 40 6/7 weeks, now corrected to 41w2d      Patient Active Problem List   Diagnosis   • Single liveborn, born in hospital, delivered by vaginal delivery   • Meconium in amniotic fluid noted in labor/delivery, liveborn infant   • LGA (large for gestational age) infant   • Respiratory distress   • At risk for sepsis in    • Meconium aspiration syndrome       Former Gestational Age: 40w6d male infant, now corrected to 41w2d    Recommendations:    Thermoregulation:    Baby is under : Warmer      Fluids, Electrolytes and Nutrition:  Assessment:   Full term  infant  1. Feeds : 50 ml q 3hrs NG  2. On IVF at 4ml/hr  3. Na 143    Plan: Feeds: Advance feeds by 5ml q 3hrs and wean IVF as tolerated to a TF of 130 to 140ml/kg/day  -    Respiratory System:  Assessment: Meconium Aspiration Syndrome   1. CXR with opacities in R lung > L consistent with mild MAS  2. On HRNC 2 LPM 30% and SpO2 96%  3. Mild resp distress with tachypnea and mild SC retractions   4. CBG  On  - wnl as above  S/P Amp and Gent x36hrs  : in 30% oxygen 2lpm  : in RA off NC    Plan: Watch respiratory status in RA  -    Apnea/Bradycardia/Desaturations:  Assessment: None    - Last Apnea:   ;    - Intervention:    - Last Bradycardia: length each event lasted (in sec) over the past 24 hours:  ;    - Interventions:          Plan:  -    Cardio Vascular System:   Assessment: None  -     Plan:  - No active issues     HEENT:   Assessment: None  -     Plan:  - No active issues     Gastrointestinal System:  Assessment: None  -     Plan:  - No active issues     Renal System:  Assessment: None  -   Plan:  - No active issues    Hematology:  Assessment: None    Baby's blood type is A Rh Positive;  Christian: Negative  Maternal blood type is   Information for the patient's mother:  Silva Atkinson [[id number]]   O Rh Negative     Last Bilirubin:   Bilirubin, Total (mg/dL)   Date Value   2022 (HH)     Last  found.    LABS:  No results found for this visit on 04/17/21. EMERGENCY DEPARTMENT COURSE:        The patient was given the following medications:  Orders Placed This Encounter   Medications    penicillin v potassium (VEETID) tablet 500 mg     Order Specific Question:   Antimicrobial Indications     Answer:   Head and Neck Infection    penicillin v potassium (VEETID) 500 MG tablet     Sig: Take 1 tablet by mouth 4 times daily for 7 days     Dispense:  28 tablet     Refill:  0    promethazine (PHENERGAN) 6.25 MG/5ML syrup     Sig: Take 20 mLs by mouth 4 times daily as needed for Nausea     Dispense:  300 mL     Refill:  0        Vitals:    Vitals:    04/17/21 0631 04/17/21 0655   BP: (!) 149/104    Pulse: 128 100   Resp: 16    Temp: 97.2 °F (36.2 °C)    TempSrc: Tympanic    SpO2: 96%    Weight: 260 lb (117.9 kg)    Height: 6' (1.829 m)      -------------------------  BP: (!) 149/104, Temp: 97.2 °F (36.2 °C), Pulse: 100, Resp: 16    CONSULTS:  None    CRITICAL CARE:   None    PROCEDURES:  Dental Block Procedure Note    Indication: dental pain    Procedure: The patient was placed in the supine position. 2 cc of 1% Lidocaine without epinephrine was injected to perform a superior alveolar nerve block on the right. The patient tolerated the procedure well. Complications: None      DIAGNOSIS/ MDM:   Alton Pallas is a 35 y.o. male who presents with dental pain. Vital signs initially showed tachycardia which I suspect is secondary to pain. I performed a right superior alveolar dental block with improvement in pain and heart rate returned to normal.  There is tenderness to palpation of the right upper and lower gum lines. No visible abscess. Posterior pharynx is clear. I suspect the patient's pain is secondary to a developing dental infection. I have low suspicion for peritonsillar abscess, epiglottitis, or Yamil's angina.   Patient was instructed to continue to take Tylenol as needed for pain and he was Hg:   HGB (g/dL)   Date Value   2022         Plan: Follow Clinically      Infectious Disease:  Assessment: MAS - mild   -     Early onset sepsis screen:  Gestational Age: 40w6d  Highest Maternal Antepartum Temp (F): 98.5  Rupture of Membranes (Hours): 0.4 hours          Risk at Birth: 0.06  Risk - Well Appearin.02  Risk - Equivocal: 0.3  Risk - Clinical Illness: 1.25    Clinical Exam:  Clinical Illness - Persistent need for NCPAP / HFNC / mechanical ventilation (outside of the delivery room)    Plan:  - MAS  - Blood culture - pending and CBC- wnl  on admission - Yes  - Baby was started on Ampicillin/Gentamicin x 36 hours pending Blood Cultures.      Endocrine System:  Assessment: At risk for Hypoglycemia   LGA  accucheks have been wnl-     Plan:   follow accucheks per policy q shift while on IVF    - No active issues       Central Nervous System:  Assessment: None  -     Plan:     - No active issues    Ophthalmology:  Assessment: None      Plan:   - No active issues      Genetics:  Assessment: None  -    Plan:        Musculo Skeletal:  Assessment: 1. Abnormality noted in ribs on CXR  A: 1.  Noted by uche on CXR  on 2022 some 'interruption in density' of R 9th post rib and   Per radiologist \"Questionable cortical irregularity posterior right 9th rib,'   2. No abnormality or tenderness noted in back area on R     Plan: Rpt CXR to reevaluate area before discharge      Skin:  Assessment:  -    Plan:      Other:       Therapies:           Assessment: Being actively managed for the following Problem List:  2022: Respiratory distress  2022: At risk for sepsis in   2022: Meconium aspiration syndrome  2022: Single liveborn, born in hospital, delivered by vaginal   delivery  2022: Meconium in amniotic fluid noted in labor/delivery, liveborn   infant  2022: LGA (large for gestational age) infant        I have spoken with the Nursing Staff , peds hosp  and reviewed findings and plan  started on penicillin. The patient was instructed to follow-up with the dentist as soon as possible and to return to the ED for worsening symptoms or any other concern. The patient understands that at this time there is no evidence for a more malignant underlying process, but also understands that early in the process of an illness or injury, an emergency department work-up can be falsely reassuring. Routine discharge counseling was given, and the patient understands that worsening, changing or persistent symptoms should prompt a immediate call or follow-up with their primary care physician or return to the emergency department. The importance of appropriate follow-up was also discussed. I have reviewed the disposition diagnosis with the patient. I have answered their questions and given discharge instructions. They voiced understanding of these instructions and did not have any further questions or complaints. FINAL IMPRESSION      1. Dental infection    2.  Nausea          DISPOSITION/PLAN   DISPOSITION Decision To Discharge 04/17/2021 06:42:37 AM        PATIENT REFERRED TO:  Johann Soto MD  [address] [facility] [place]  [phone]    Schedule an appointment as soon as possible for a visit in 2 days      Your dentist    Schedule an appointment as soon as possible for a visit in 1 day      [address] [facility]  [address] [place]  [address]  [phone]  Go to   If symptoms worsen      DISCHARGE MEDICATIONS:  New Prescriptions    PENICILLIN V POTASSIUM (VEETID) 500 MG TABLET    Take 1 tablet by mouth 4 times daily for 7 days       (Please note that portions of this note were completed with a voice recognitionprogram.  Efforts were made to edit the dictations but occasionally words are mis-transcribed.)    [address]  Emergency Physician Attending         Galileo Gupta DO  04/17/21 8736 of management.    The infants current condition and plan of management was updated with  Mother by Neonatologiston 2022

## 2022-01-05 ENCOUNTER — HOSPITAL ENCOUNTER (EMERGENCY)
Age: 32
Discharge: HOME | End: 2022-01-05
Attending: EMERGENCY MEDICINE
Payer: MEDICAID

## 2022-01-05 ENCOUNTER — APPOINTMENT (OUTPATIENT)
Dept: GENERAL RADIOLOGY | Age: 32
End: 2022-01-05
Payer: MEDICAID

## 2022-01-05 VITALS
RESPIRATION RATE: 16 BRPM | SYSTOLIC BLOOD PRESSURE: 174 MMHG | BODY MASS INDEX: 31.88 KG/M2 | HEART RATE: 89 BPM | TEMPERATURE: 99.1 F | HEIGHT: 77 IN | WEIGHT: 270 LBS | DIASTOLIC BLOOD PRESSURE: 112 MMHG | OXYGEN SATURATION: 98 %

## 2022-01-05 DIAGNOSIS — Z20.822 CLOSE EXPOSURE TO COVID-19 VIRUS: ICD-10-CM

## 2022-01-05 DIAGNOSIS — S92.352A CLOSED DISPLACED FRACTURE OF FIFTH METATARSAL BONE OF LEFT FOOT, INITIAL ENCOUNTER: Primary | ICD-10-CM

## 2022-01-05 PROCEDURE — 99283 EMERGENCY DEPT VISIT LOW MDM: CPT

## 2022-01-05 PROCEDURE — U0003 INFECTIOUS AGENT DETECTION BY NUCLEIC ACID (DNA OR RNA); SEVERE ACUTE RESPIRATORY SYNDROME CORONAVIRUS 2 (SARS-COV-2) (CORONAVIRUS DISEASE [COVID-19]), AMPLIFIED PROBE TECHNIQUE, MAKING USE OF HIGH THROUGHPUT TECHNOLOGIES AS DESCRIBED BY CMS-2020-01-R: HCPCS

## 2022-01-05 PROCEDURE — U0005 INFEC AGEN DETEC AMPLI PROBE: HCPCS

## 2022-01-05 PROCEDURE — 73630 X-RAY EXAM OF FOOT: CPT

## 2022-01-05 RX ORDER — HYDROCODONE BITARTRATE AND ACETAMINOPHEN 5; 325 MG/1; MG/1
1 TABLET ORAL EVERY 6 HOURS PRN
Qty: 20 TABLET | Refills: 0 | Status: SHIPPED | OUTPATIENT
Start: 2022-01-05 | End: 2022-01-07 | Stop reason: DRUGHIGH

## 2022-01-05 ASSESSMENT — ENCOUNTER SYMPTOMS
TROUBLE SWALLOWING: 0
VOMITING: 0
SORE THROAT: 0
BLOOD IN STOOL: 0
CONSTIPATION: 0
SHORTNESS OF BREATH: 0
ABDOMINAL PAIN: 0
DIARRHEA: 0
NAUSEA: 0
COUGH: 1
BACK PAIN: 0
WHEEZING: 0

## 2022-01-05 ASSESSMENT — PAIN DESCRIPTION - PAIN TYPE: TYPE: ACUTE PAIN

## 2022-01-05 ASSESSMENT — PAIN DESCRIPTION - ORIENTATION: ORIENTATION: LEFT

## 2022-01-05 ASSESSMENT — PAIN SCALES - GENERAL: PAINLEVEL_OUTOF10: 8

## 2022-01-05 ASSESSMENT — PAIN DESCRIPTION - LOCATION: LOCATION: FOOT

## 2022-01-05 NOTE — ED PROVIDER NOTES
[facility]  eMERGENCY dEPARTMENT eNCOUnter      Pt Name: Trisha Hung  MRN: [id number]  [place] 1990  Date of evaluation: 1/5/2022      CHIEF COMPLAINT       Chief Complaint   Patient presents with    Foot Pain     ongoing for couple months    Concern For COVID-19     daughter positive for covid         HISTORY OF PRESENT ILLNESS    Trisha Hung is a 28 y.o. male who presents with a chief complaint CC Left foot pain after running and striking against the wall was try to get to his daughter was crying has had problems with the right foot for \"quite some time he had a gout attack and since then has had chronic numbness and pain but there is been no new injury there is been no fevers she has had cough and congestion he has had a positive Covid exposure at home      REVIEW OF SYSTEMS         Review of Systems   Constitutional: Negative for chills and fever. HENT: Positive for congestion. Negative for dental problem, sore throat and trouble swallowing. Eyes: Negative for visual disturbance. Respiratory: Positive for cough. Negative for shortness of breath and wheezing. Cardiovascular: Negative for chest pain, palpitations and leg swelling. Gastrointestinal: Negative for abdominal pain, blood in stool, constipation, diarrhea, nausea and vomiting. Genitourinary: Negative for difficulty urinating, dysuria and testicular pain. Musculoskeletal: Negative for back pain, joint swelling and neck pain. Left foot pain acute right foot pain chronic   Skin: Negative for rash. Neurological: Positive for numbness. Negative for dizziness, syncope, weakness and headaches. Hematological: Negative for adenopathy. Does not bruise/bleed easily. Psychiatric/Behavioral: Negative for confusion and suicidal ideas. PAST MEDICAL HISTORY    has a past medical history of Crohn's colitis ([facility] [address]     SURGICAL HISTORY      has a past surgical history that includes Colon surgery and hernia repair. CURRENT MEDICATIONS       Previous Medications    ALLOPURINOL (ZYLOPRIM) 100 MG TABLET    Take 1 tablet by mouth daily    AMLODIPINE (NORVASC) 2.5 MG TABLET    Take 1 tablet by mouth daily    COLCHICINE (COLCRYS) 0.6 MG TABLET    Take 1 tablet by mouth daily    DICLOFENAC SODIUM (VOLTAREN) 1 % GEL    Apply 4 g topically 3 times daily    GABAPENTIN (NEURONTIN) 100 MG CAPSULE    Take 1 capsule by mouth 2 times daily for 60 days. Intended supply: 30 days    OMEPRAZOLE (PRILOSEC) 40 MG DELAYED RELEASE CAPSULE    Take 1 capsule by mouth every morning (before breakfast)    PREGABALIN (LYRICA) 50 MG CAPSULE    Take 1 capsule by mouth daily for 30 days. PROMETHAZINE (PHENERGAN) 6.25 MG/5ML SYRUP    Take 20 mLs by mouth 4 times daily as needed for Nausea       ALLERGIES     has No Known Allergies. FAMILY HISTORY     He indicated that the status of his father is unknown. He indicated that the status of his maternal aunt is unknown.     family history includes Crohn's Disease in his maternal aunt; Hypertension in his father. SOCIAL HISTORY      reports that he has been smoking cigarettes. He has been smoking about 0.50 packs per day. He has never used smokeless tobacco. He reports current alcohol use. He reports previous drug use. PHYSICAL EXAM     INITIAL VITALS:  height is 6' 5\" (1.956 m) and weight is 270 lb (122.5 kg). His tympanic temperature is 99.1 °F (37.3 °C). His blood pressure is 174/112 (abnormal) and his pulse is 89. His respiration is 16 and oxygen saturation is 98%. Physical Exam  Constitutional:       Appearance: Normal appearance. He is well-developed. HENT:      Head: Normocephalic and atraumatic. Left Ear: External ear normal.   Eyes:      Pupils: Pupils are equal, round, and reactive to light. Cardiovascular:      Rate and Rhythm: Normal rate and regular rhythm. Pulmonary:      Effort: Pulmonary effort is normal.      Breath sounds: Normal breath sounds.    Abdominal: General: Bowel sounds are normal.      Palpations: Abdomen is soft. Musculoskeletal:         General: Tenderness present. Normal range of motion. Cervical back: Normal range of motion and neck supple. Comments: Patient has tenderness on the lateral aspect of the left foot with no obvious deformities no tenderness at the ankle knee or hip   Skin:     General: Skin is warm and dry. Neurological:      General: No focal deficit present. Mental Status: He is alert and oriented to person, place, and time.    Psychiatric:         Behavior: Behavior normal.           DIFFERENTIAL DIAGNOSIS/ MDM:     Concern for Covid will do an outpatient Covid test as he has normal oxygen saturations will obtain an x-ray of the left foot    DIAGNOSTIC RESULTS     EKG: All EKG's are interpreted by the Emergency Department Physician who either signs or Co-signs this chart in the absence of a cardiologist.        RADIOLOGY:   I directly visualized the following  images and reviewed the radiologist interpretations:       EXAMINATION:   THREE XRAY VIEWS OF THE LEFT FOOT       1/5/2022 1:04 pm       COMPARISON:   10/24/2021       HISTORY:   ORDERING SYSTEM PROVIDED HISTORY: Hit foot against the wall while he was   running to get to his baby   TECHNOLOGIST PROVIDED HISTORY:   Hit foot against the wall while he was running to get to his baby   Reason for Exam: hit left foot yesterday, 5th digit pain       FINDINGS:   Comminuted mildly displaced midshaft fracture of the 5th metatarsal.  The   other osseous structures of the foot appear intact.  No calcaneal spurring is   identified.           Impression   Comminuted mildly displaced midshaft fracture of the 5th metatarsal.               ED BEDSIDE ULTRASOUND:       LABS:  Labs Reviewed   COVID-19           EMERGENCY DEPARTMENT COURSE:   Vitals:    Vitals:    01/05/22 1230 01/05/22 1232   BP: (!) 174/112    Pulse:  89   Resp:  16   Temp:  99.1 °F (37.3 °C)   TempSrc:  Tympanic SpO2:  98%   Weight:  270 lb (122.5 kg)   Height:  6' 5\" (1.956 m)     -------------------------  BP: (!) 174/112, Temp: 99.1 °F (37.3 °C), Pulse: 89, Resp: 16        Re-evaluation Notes        CRITICAL CARE:   None        CONSULTS:      PROCEDURES:  None    FINAL IMPRESSION      1. Closed displaced fracture of fifth metatarsal bone of left foot, initial encounter    2. Close exposure to COVID-19 virus          DISPOSITION/PLAN   DISPOSITION discharged    Condition on Disposition    Stable    PATIENT REFERRED TO:  Aisha Kirby MD  [phone] [id number] [phone]    In 1 week        DISCHARGE MEDICATIONS:  New Prescriptions    HYDROCODONE-ACETAMINOPHEN (NORCO) 5-325 MG PER TABLET    Take 1 tablet by mouth every 6 hours as needed for Pain for up to 3 days. (Please note that portions of this note were completed with a voice recognition program.  Efforts were made to edit the dictations but occasionally words are mis-transcribed.)    Ankit Bowser MD,, MD, F.A.A.E.M.   Attending Emergency Physician                          Ankit Bowser MD  01/05/22 9021

## 2022-01-06 ENCOUNTER — TELEPHONE (OUTPATIENT)
Dept: FAMILY MEDICINE CLINIC | Age: 32
End: 2022-01-06

## 2022-01-06 DIAGNOSIS — S92.352A CLOSED DISPLACED FRACTURE OF FIFTH METATARSAL BONE OF LEFT FOOT, INITIAL ENCOUNTER: Primary | ICD-10-CM

## 2022-01-06 LAB
SARS-COV-2: ABNORMAL
SARS-COV-2: DETECTED
SOURCE: ABNORMAL

## 2022-01-06 NOTE — TELEPHONE ENCOUNTER
Pt calling stating he has a fx in his foot and the Katharine Jesusia is not working, can you call in stronger pain meds, pt uses pended pharmacy.

## 2022-01-07 ENCOUNTER — TELEPHONE (OUTPATIENT)
Dept: ORTHOPEDIC SURGERY | Age: 32
End: 2022-01-07

## 2022-01-07 DIAGNOSIS — S92.352G CLOSED DISPLACED FRACTURE OF FIFTH METATARSAL BONE OF LEFT FOOT WITH DELAYED HEALING, SUBSEQUENT ENCOUNTER: Primary | ICD-10-CM

## 2022-01-07 RX ORDER — HYDROCODONE BITARTRATE AND ACETAMINOPHEN 7.5; 325 MG/1; MG/1
1 TABLET ORAL EVERY 6 HOURS PRN
Qty: 28 TABLET | Refills: 0 | Status: SHIPPED | OUTPATIENT
Start: 2022-01-07 | End: 2022-01-14

## 2022-01-07 NOTE — TELEPHONE ENCOUNTER
[facility]    Pre-Operative Evaluation/Consultation    Name:  Memo Benites                                         Age:  28 y.o. MRN:  [id number]       :  1990   Date:  2022         Sex: male    There were no encounter diagnoses. Surgeon:  Dr. Julius Gutiérrez  Procedure (Planned):  Left 5th metatarsal fracture/ ORIF  Date Scheduled surgery: 22    Attending : No att. providers found    Primary Physician:   Cardiologist: None    Type of Anesthesia Requested: General    Patient Medical history:  No Known Allergies  There is no problem list on file for this patient. Past Medical History:   Diagnosis Date    Crohn's colitis [facility])      Past Surgical History:   Procedure Laterality Date    COLON SURGERY      HERNIA REPAIR       Social History     Tobacco Use    Smoking status: Current Every Day Smoker     Packs/day: 0.50     Types: Cigarettes    Smokeless tobacco: Never Used   Vaping Use    Vaping Use: Never used   Substance Use Topics    Alcohol use: Yes     Comment: socially    Drug use: Not Currently     Medications:  Current Outpatient Medications   Medication Sig Dispense Refill    HYDROcodone-acetaminophen (NORCO) 7.5-325 MG per tablet Take 1 tablet by mouth every 6 hours as needed for Pain for up to 7 days. Intended supply: 30 days 28 tablet 0    promethazine (PHENERGAN) 6.25 MG/5ML syrup Take 20 mLs by mouth 4 times daily as needed for Nausea 300 mL 2    omeprazole (PRILOSEC) 40 MG delayed release capsule Take 1 capsule by mouth every morning (before breakfast) 30 capsule 2    gabapentin (NEURONTIN) 100 MG capsule Take 1 capsule by mouth 2 times daily for 60 days. Intended supply: 30 days 60 capsule 1    amLODIPine (NORVASC) 2.5 MG tablet Take 1 tablet by mouth daily 90 tablet 1    pregabalin (LYRICA) 50 MG capsule Take 1 capsule by mouth daily for 30 days.  (Patient not taking: Reported on 2021) 30 capsule 0    colchicine (COLCRYS) 0.6 MG tablet Take 1 tablet by mouth daily (Patient not taking: Reported on 12/6/2021) 30 tablet 0    allopurinol (ZYLOPRIM) 100 MG tablet Take 1 tablet by mouth daily (Patient not taking: Reported on 12/13/2021) 30 tablet 3    diclofenac sodium (VOLTAREN) 1 % GEL Apply 4 g topically 3 times daily 100 g 0     No current facility-administered medications for this visit. Scheduled Meds:  Continuous Infusions:  PRN Meds:. Prior to Admission medications    Medication Sig Start Date End Date Taking? Authorizing Provider   HYDROcodone-acetaminophen (NORCO) 7.5-325 MG per tablet Take 1 tablet by mouth every 6 hours as needed for Pain for up to 7 days. Intended supply: 30 days 1/7/22 1/14/22  Boston Rojas MD   promethazine (PHENERGAN) 6.25 MG/5ML syrup Take 20 mLs by mouth 4 times daily as needed for Nausea 12/9/21   Boston Rojas MD   omeprazole (PRILOSEC) 40 MG delayed release capsule Take 1 capsule by mouth every morning (before breakfast) 12/8/21   Greg Santos MD   gabapentin (NEURONTIN) 100 MG capsule Take 1 capsule by mouth 2 times daily for 60 days. Intended supply: 30 days 12/6/21 2/4/22  Boston Rojas MD   amLODIPine (NORVASC) 2.5 MG tablet Take 1 tablet by mouth daily 12/6/21   Boston Rojas MD   pregabalin (LYRICA) 50 MG capsule Take 1 capsule by mouth daily for 30 days.   Patient not taking: Reported on 12/13/2021 11/23/21 12/23/21  Boston Rojas MD   colchicine (COLCRYS) 0.6 MG tablet Take 1 tablet by mouth daily  Patient not taking: Reported on 12/6/2021 10/26/21   Boston Rojas MD   allopurinol (ZYLOPRIM) 100 MG tablet Take 1 tablet by mouth daily  Patient not taking: Reported on 12/13/2021 10/26/21   Boston Rojas MD   diclofenac sodium (VOLTAREN) 1 % GEL Apply 4 g topically 3 times daily 10/24/21   Woody Ureña MD     Vital Signs (Current) [unfilled]    Weight:   Wt Readings from Last 1 Encounters:   01/05/22 270 lb (122.5 kg)     Height:   Ht Readings from Last 1 Encounters:   01/05/22 6' 5\" (1.956 m)      BMI:  There is no height or weight on file to calculate BMI. Estimated body mass index is 32.02 kg/m² as calculated from the following:    Height as of 1/5/22: 6' 5\" (1.956 m). Weight as of 1/5/22: 270 lb (122.5 kg). body mass index is unknown because there is no height or weight on file. Preoperative Testing: These are the current and completed labs:  CBC:   Lab Results   Component Value Date    WBC 10.8 10/24/2021    RBC 4.79 10/24/2021    HGB 13.7 10/24/2021    HCT 40.5 10/24/2021    MCV 84.6 10/24/2021    RDW 13.2 10/24/2021     10/24/2021     CMP:   Lab Results   Component Value Date     10/24/2021    K 3.7 10/24/2021    CL 97 10/24/2021    CO2 28 10/24/2021    BUN 6 10/24/2021    CREATININE 0.85 10/24/2021    GFRAA >60 10/24/2021    LABGLOM >60 10/24/2021    GLUCOSE 107 10/24/2021    PROT 7.1 08/08/2021    CALCIUM 9.7 10/24/2021    BILITOT 0.54 08/08/2021    ALKPHOS 95 08/08/2021    AST 16 08/08/2021    ALT 39 08/08/2021     POC Tests: No results for input(s): POCGLU, POCNA, POCK, POCCL, POCBUN, POCHEMO, POCHCT in the last 72 hours.   Coags    Lab Results   Component Value Date    PROTIME 12.8 10/24/2021    INR 1.0 10/24/2021    APTT 30.7 04/47/5310     HCG (If Applicable) No results found for: PREGTESTUR, PREGSERUM, HCG, HCGQUANT   ABGs No results found for: PHART, PO2ART, QLK0IAS, KFR5GIE, BEART, Q0OCEITO   Type & Screen (If Applicable)  No results found for: Rochele Mace    Additional ordered pre-operative testing:  []CBC    []ABG      [] BMP   []URINALYSIS   []CMP    []HCG   []COAGS PT/INR  []T&C  []LFTs   []TYPE AND SCREEN    [] EKG  [] Chest X-Ray  [] Other Radiology    [] Sent to Hospitalist None  [] Sent to Anesthesia for your review: 01/07/22   [] Additional Orders: None     Comments:None   Requests: None    Signed: Maxim Griffin LPN 6/7/2713 23:24 PM

## 2022-01-11 ENCOUNTER — TELEPHONE (OUTPATIENT)
Dept: FAMILY MEDICINE CLINIC | Age: 32
End: 2022-01-11

## 2022-01-12 DIAGNOSIS — T14.8XXA FRACTURE: Primary | ICD-10-CM

## 2022-01-12 DIAGNOSIS — M79.672 LEFT FOOT PAIN: ICD-10-CM

## 2022-01-18 ENCOUNTER — HOSPITAL ENCOUNTER (OUTPATIENT)
Age: 32
Setting detail: OUTPATIENT SURGERY
Discharge: HOME | End: 2022-01-18
Attending: ORTHOPAEDIC SURGERY | Admitting: ORTHOPAEDIC SURGERY
Payer: MEDICAID

## 2022-01-18 ENCOUNTER — APPOINTMENT (OUTPATIENT)
Dept: GENERAL RADIOLOGY | Age: 32
End: 2022-01-18
Attending: ORTHOPAEDIC SURGERY
Payer: MEDICAID

## 2022-01-18 ENCOUNTER — OFFICE VISIT (OUTPATIENT)
Dept: ORTHOPEDIC SURGERY | Age: 32
End: 2022-01-18
Payer: MEDICAID

## 2022-01-18 ENCOUNTER — ANESTHESIA EVENT (OUTPATIENT)
Dept: OPERATING ROOM | Age: 32
End: 2022-01-18
Payer: MEDICAID

## 2022-01-18 ENCOUNTER — HOSPITAL ENCOUNTER (OUTPATIENT)
Dept: GENERAL RADIOLOGY | Age: 32
Discharge: HOME | End: 2022-01-20
Payer: MEDICAID

## 2022-01-18 ENCOUNTER — ANESTHESIA (OUTPATIENT)
Dept: OPERATING ROOM | Age: 32
End: 2022-01-18
Payer: MEDICAID

## 2022-01-18 VITALS
BODY MASS INDEX: 33.06 KG/M2 | WEIGHT: 280 LBS | DIASTOLIC BLOOD PRESSURE: 82 MMHG | SYSTOLIC BLOOD PRESSURE: 139 MMHG | RESPIRATION RATE: 16 BRPM | HEART RATE: 80 BPM | TEMPERATURE: 98.7 F | HEIGHT: 77 IN | OXYGEN SATURATION: 98 %

## 2022-01-18 VITALS
TEMPERATURE: 97.4 F | DIASTOLIC BLOOD PRESSURE: 61 MMHG | SYSTOLIC BLOOD PRESSURE: 110 MMHG | OXYGEN SATURATION: 100 % | RESPIRATION RATE: 2 BRPM

## 2022-01-18 VITALS
WEIGHT: 270 LBS | DIASTOLIC BLOOD PRESSURE: 98 MMHG | HEART RATE: 102 BPM | SYSTOLIC BLOOD PRESSURE: 150 MMHG | BODY MASS INDEX: 31.88 KG/M2 | HEIGHT: 77 IN

## 2022-01-18 DIAGNOSIS — S92.355A CLOSED NONDISPLACED FRACTURE OF FIFTH METATARSAL BONE OF LEFT FOOT, INITIAL ENCOUNTER: Primary | ICD-10-CM

## 2022-01-18 DIAGNOSIS — M79.672 LEFT FOOT PAIN: ICD-10-CM

## 2022-01-18 DIAGNOSIS — T14.8XXA FRACTURE: ICD-10-CM

## 2022-01-18 PROCEDURE — 7100000011 HC PHASE II RECOVERY - ADDTL 15 MIN: Performed by: ORTHOPAEDIC SURGERY

## 2022-01-18 PROCEDURE — 2500000003 HC RX 250 WO HCPCS: Performed by: NURSE ANESTHETIST, CERTIFIED REGISTERED

## 2022-01-18 PROCEDURE — 3209999900 FLUORO FOR SURGICAL PROCEDURES

## 2022-01-18 PROCEDURE — 4004F PT TOBACCO SCREEN RCVD TLK: CPT | Performed by: PHYSICIAN ASSISTANT

## 2022-01-18 PROCEDURE — 3600000002 HC SURGERY LEVEL 2 BASE: Performed by: ORTHOPAEDIC SURGERY

## 2022-01-18 PROCEDURE — 99203 OFFICE O/P NEW LOW 30 MIN: CPT | Performed by: PHYSICIAN ASSISTANT

## 2022-01-18 PROCEDURE — 6370000000 HC RX 637 (ALT 250 FOR IP): Performed by: NURSE PRACTITIONER

## 2022-01-18 PROCEDURE — 28476 PRQ SKEL FIX METAR FX W/MNPJ: CPT | Performed by: PHYSICIAN ASSISTANT

## 2022-01-18 PROCEDURE — G8417 CALC BMI ABV UP PARAM F/U: HCPCS | Performed by: PHYSICIAN ASSISTANT

## 2022-01-18 PROCEDURE — 3600000012 HC SURGERY LEVEL 2 ADDTL 15MIN: Performed by: ORTHOPAEDIC SURGERY

## 2022-01-18 PROCEDURE — 3700000000 HC ANESTHESIA ATTENDED CARE: Performed by: ORTHOPAEDIC SURGERY

## 2022-01-18 PROCEDURE — 73630 X-RAY EXAM OF FOOT: CPT

## 2022-01-18 PROCEDURE — 28476 PRQ SKEL FIX METAR FX W/MNPJ: CPT | Performed by: ORTHOPAEDIC SURGERY

## 2022-01-18 PROCEDURE — 2580000003 HC RX 258: Performed by: ORTHOPAEDIC SURGERY

## 2022-01-18 PROCEDURE — 7100000010 HC PHASE II RECOVERY - FIRST 15 MIN: Performed by: ORTHOPAEDIC SURGERY

## 2022-01-18 PROCEDURE — 2709999900 HC NON-CHARGEABLE SUPPLY: Performed by: ORTHOPAEDIC SURGERY

## 2022-01-18 PROCEDURE — G8427 DOCREV CUR MEDS BY ELIG CLIN: HCPCS | Performed by: PHYSICIAN ASSISTANT

## 2022-01-18 PROCEDURE — 99215 OFFICE O/P EST HI 40 MIN: CPT | Performed by: PHYSICIAN ASSISTANT

## 2022-01-18 PROCEDURE — 7100000001 HC PACU RECOVERY - ADDTL 15 MIN: Performed by: ORTHOPAEDIC SURGERY

## 2022-01-18 PROCEDURE — 6360000002 HC RX W HCPCS: Performed by: NURSE ANESTHETIST, CERTIFIED REGISTERED

## 2022-01-18 PROCEDURE — G8484 FLU IMMUNIZE NO ADMIN: HCPCS | Performed by: PHYSICIAN ASSISTANT

## 2022-01-18 PROCEDURE — 2500000003 HC RX 250 WO HCPCS: Performed by: ORTHOPAEDIC SURGERY

## 2022-01-18 PROCEDURE — 6360000002 HC RX W HCPCS: Performed by: ORTHOPAEDIC SURGERY

## 2022-01-18 PROCEDURE — 7100000000 HC PACU RECOVERY - FIRST 15 MIN: Performed by: ORTHOPAEDIC SURGERY

## 2022-01-18 PROCEDURE — 3700000001 HC ADD 15 MINUTES (ANESTHESIA): Performed by: ORTHOPAEDIC SURGERY

## 2022-01-18 DEVICE — STEINMANN PIN, TROCAR POINT, BOTH ENDS: Type: IMPLANTABLE DEVICE | Site: TOES | Status: FUNCTIONAL

## 2022-01-18 RX ORDER — FENTANYL CITRATE 50 UG/ML
25 INJECTION, SOLUTION INTRAMUSCULAR; INTRAVENOUS EVERY 5 MIN PRN
Status: DISCONTINUED | OUTPATIENT
Start: 2022-01-18 | End: 2022-01-18 | Stop reason: HOSPADM

## 2022-01-18 RX ORDER — DEXAMETHASONE SODIUM PHOSPHATE 10 MG/ML
INJECTION INTRAMUSCULAR; INTRAVENOUS PRN
Status: DISCONTINUED | OUTPATIENT
Start: 2022-01-18 | End: 2022-01-18 | Stop reason: SDUPTHER

## 2022-01-18 RX ORDER — BUPIVACAINE HYDROCHLORIDE 5 MG/ML
INJECTION, SOLUTION EPIDURAL; INTRACAUDAL PRN
Status: DISCONTINUED | OUTPATIENT
Start: 2022-01-18 | End: 2022-01-18 | Stop reason: ALTCHOICE

## 2022-01-18 RX ORDER — FENTANYL CITRATE 50 UG/ML
50 INJECTION, SOLUTION INTRAMUSCULAR; INTRAVENOUS EVERY 5 MIN PRN
Status: DISCONTINUED | OUTPATIENT
Start: 2022-01-18 | End: 2022-01-18 | Stop reason: HOSPADM

## 2022-01-18 RX ORDER — SODIUM CHLORIDE 0.9 % (FLUSH) 0.9 %
5-40 SYRINGE (ML) INJECTION PRN
Status: DISCONTINUED | OUTPATIENT
Start: 2022-01-18 | End: 2022-01-18 | Stop reason: HOSPADM

## 2022-01-18 RX ORDER — OXYCODONE HYDROCHLORIDE 5 MG/1
5 TABLET ORAL PRN
Status: COMPLETED | OUTPATIENT
Start: 2022-01-18 | End: 2022-01-18

## 2022-01-18 RX ORDER — ONDANSETRON 2 MG/ML
4 INJECTION INTRAMUSCULAR; INTRAVENOUS PRN
Status: DISCONTINUED | OUTPATIENT
Start: 2022-01-18 | End: 2022-01-18 | Stop reason: HOSPADM

## 2022-01-18 RX ORDER — MIDAZOLAM HYDROCHLORIDE 1 MG/ML
INJECTION INTRAMUSCULAR; INTRAVENOUS PRN
Status: DISCONTINUED | OUTPATIENT
Start: 2022-01-18 | End: 2022-01-18 | Stop reason: SDUPTHER

## 2022-01-18 RX ORDER — LIDOCAINE HYDROCHLORIDE 40 MG/ML
INJECTION, SOLUTION RETROBULBAR; TOPICAL PRN
Status: DISCONTINUED | OUTPATIENT
Start: 2022-01-18 | End: 2022-01-18 | Stop reason: SDUPTHER

## 2022-01-18 RX ORDER — SODIUM CHLORIDE, SODIUM LACTATE, POTASSIUM CHLORIDE, CALCIUM CHLORIDE 600; 310; 30; 20 MG/100ML; MG/100ML; MG/100ML; MG/100ML
INJECTION, SOLUTION INTRAVENOUS CONTINUOUS
Status: DISCONTINUED | OUTPATIENT
Start: 2022-01-18 | End: 2022-01-18 | Stop reason: HOSPADM

## 2022-01-18 RX ORDER — HYDROCODONE BITARTRATE AND ACETAMINOPHEN 5; 325 MG/1; MG/1
1 TABLET ORAL EVERY 6 HOURS PRN
Qty: 30 TABLET | Refills: 0 | Status: SHIPPED | OUTPATIENT
Start: 2022-01-18 | End: 2022-01-25

## 2022-01-18 RX ORDER — GLYCOPYRROLATE 1 MG/5 ML
SYRINGE (ML) INTRAVENOUS PRN
Status: DISCONTINUED | OUTPATIENT
Start: 2022-01-18 | End: 2022-01-18 | Stop reason: SDUPTHER

## 2022-01-18 RX ORDER — FENTANYL CITRATE 50 UG/ML
INJECTION, SOLUTION INTRAMUSCULAR; INTRAVENOUS PRN
Status: DISCONTINUED | OUTPATIENT
Start: 2022-01-18 | End: 2022-01-18 | Stop reason: SDUPTHER

## 2022-01-18 RX ORDER — MORPHINE SULFATE 2 MG/ML
2 INJECTION, SOLUTION INTRAMUSCULAR; INTRAVENOUS EVERY 5 MIN PRN
Status: DISCONTINUED | OUTPATIENT
Start: 2022-01-18 | End: 2022-01-18 | Stop reason: HOSPADM

## 2022-01-18 RX ORDER — DIPHENHYDRAMINE HYDROCHLORIDE 50 MG/ML
12.5 INJECTION INTRAMUSCULAR; INTRAVENOUS
Status: DISCONTINUED | OUTPATIENT
Start: 2022-01-18 | End: 2022-01-18 | Stop reason: HOSPADM

## 2022-01-18 RX ORDER — OXYCODONE HYDROCHLORIDE 5 MG/1
10 TABLET ORAL PRN
Status: COMPLETED | OUTPATIENT
Start: 2022-01-18 | End: 2022-01-18

## 2022-01-18 RX ORDER — METOPROLOL TARTRATE 5 MG/5ML
INJECTION INTRAVENOUS PRN
Status: DISCONTINUED | OUTPATIENT
Start: 2022-01-18 | End: 2022-01-18 | Stop reason: SDUPTHER

## 2022-01-18 RX ORDER — PROPOFOL 10 MG/ML
INJECTION, EMULSION INTRAVENOUS PRN
Status: DISCONTINUED | OUTPATIENT
Start: 2022-01-18 | End: 2022-01-18 | Stop reason: SDUPTHER

## 2022-01-18 RX ORDER — ONDANSETRON 2 MG/ML
INJECTION INTRAMUSCULAR; INTRAVENOUS PRN
Status: DISCONTINUED | OUTPATIENT
Start: 2022-01-18 | End: 2022-01-18 | Stop reason: SDUPTHER

## 2022-01-18 RX ADMIN — FENTANYL CITRATE 100 MCG: 50 INJECTION, SOLUTION INTRAMUSCULAR; INTRAVENOUS at 11:42

## 2022-01-18 RX ADMIN — FENTANYL CITRATE 100 MCG: 50 INJECTION, SOLUTION INTRAMUSCULAR; INTRAVENOUS at 11:49

## 2022-01-18 RX ADMIN — METOPROLOL TARTRATE 5 MG: 5 INJECTION INTRAVENOUS at 11:02

## 2022-01-18 RX ADMIN — ONDANSETRON 4 MG: 2 INJECTION INTRAMUSCULAR; INTRAVENOUS at 10:53

## 2022-01-18 RX ADMIN — FENTANYL CITRATE 100 MCG: 50 INJECTION, SOLUTION INTRAMUSCULAR; INTRAVENOUS at 10:36

## 2022-01-18 RX ADMIN — PROPOFOL 200 MG: 10 INJECTION, EMULSION INTRAVENOUS at 10:45

## 2022-01-18 RX ADMIN — PROPOFOL 100 MG: 10 INJECTION, EMULSION INTRAVENOUS at 10:51

## 2022-01-18 RX ADMIN — DEXAMETHASONE SODIUM PHOSPHATE 10 MG: 10 INJECTION INTRAMUSCULAR; INTRAVENOUS at 10:53

## 2022-01-18 RX ADMIN — MIDAZOLAM HYDROCHLORIDE 4 MG: 1 INJECTION, SOLUTION INTRAMUSCULAR; INTRAVENOUS at 10:36

## 2022-01-18 RX ADMIN — Medication 3000 MG: at 10:50

## 2022-01-18 RX ADMIN — Medication 0.4 MG: at 10:36

## 2022-01-18 RX ADMIN — OXYCODONE 10 MG: 5 TABLET ORAL at 12:21

## 2022-01-18 RX ADMIN — SODIUM CHLORIDE, POTASSIUM CHLORIDE, SODIUM LACTATE AND CALCIUM CHLORIDE: 600; 310; 30; 20 INJECTION, SOLUTION INTRAVENOUS at 10:38

## 2022-01-18 RX ADMIN — LIDOCAINE HYDROCHLORIDE 100 MG: 40 INJECTION, SOLUTION RETROBULBAR; TOPICAL at 10:44

## 2022-01-18 RX ADMIN — Medication 1 MG: at 10:56

## 2022-01-18 ASSESSMENT — PULMONARY FUNCTION TESTS
PIF_VALUE: 11
PIF_VALUE: 4
PIF_VALUE: 20
PIF_VALUE: 16
PIF_VALUE: 16
PIF_VALUE: 0
PIF_VALUE: 12
PIF_VALUE: 11
PIF_VALUE: 12
PIF_VALUE: 16
PIF_VALUE: 12
PIF_VALUE: 11
PIF_VALUE: 16
PIF_VALUE: 12
PIF_VALUE: 2
PIF_VALUE: 16
PIF_VALUE: 12
PIF_VALUE: 15
PIF_VALUE: 12
PIF_VALUE: 2
PIF_VALUE: 16
PIF_VALUE: 16
PIF_VALUE: 5
PIF_VALUE: 11
PIF_VALUE: 4
PIF_VALUE: 16
PIF_VALUE: 11
PIF_VALUE: 16
PIF_VALUE: 16
PIF_VALUE: 4
PIF_VALUE: 12
PIF_VALUE: 10
PIF_VALUE: 16
PIF_VALUE: 16
PIF_VALUE: 12
PIF_VALUE: 12
PIF_VALUE: 16
PIF_VALUE: 16
PIF_VALUE: 10
PIF_VALUE: 16
PIF_VALUE: 12
PIF_VALUE: 12
PIF_VALUE: 10
PIF_VALUE: 11
PIF_VALUE: 10
PIF_VALUE: 5
PIF_VALUE: 16
PIF_VALUE: 0
PIF_VALUE: 16
PIF_VALUE: 2
PIF_VALUE: 15
PIF_VALUE: 12
PIF_VALUE: 0
PIF_VALUE: 10
PIF_VALUE: 16

## 2022-01-18 ASSESSMENT — PAIN DESCRIPTION - PAIN TYPE
TYPE: SURGICAL PAIN

## 2022-01-18 ASSESSMENT — PAIN SCALES - GENERAL
PAINLEVEL_OUTOF10: 7
PAINLEVEL_OUTOF10: 6
PAINLEVEL_OUTOF10: 8
PAINLEVEL_OUTOF10: 10
PAINLEVEL_OUTOF10: 8
PAINLEVEL_OUTOF10: 9

## 2022-01-18 ASSESSMENT — PAIN DESCRIPTION - ORIENTATION
ORIENTATION: LEFT

## 2022-01-18 ASSESSMENT — PAIN DESCRIPTION - LOCATION
LOCATION: FOOT

## 2022-01-18 ASSESSMENT — PAIN DESCRIPTION - DESCRIPTORS
DESCRIPTORS: SHARP
DESCRIPTORS: ACHING;BURNING
DESCRIPTORS: BURNING;ACHING
DESCRIPTORS: ACHING;BURNING
DESCRIPTORS: ACHING
DESCRIPTORS: ACHING;BURNING
DESCRIPTORS: SHARP
DESCRIPTORS: ACHING;BURNING

## 2022-01-18 ASSESSMENT — PAIN - FUNCTIONAL ASSESSMENT: PAIN_FUNCTIONAL_ASSESSMENT: 0-10

## 2022-01-18 ASSESSMENT — LIFESTYLE VARIABLES: SMOKING_STATUS: 1

## 2022-01-18 NOTE — H&P
History and Physical Update    Pt Name: Nelson Gonzalez  MRN: [id number]  YOB: 1990  Date of evaluation: 1/18/2022    [x] I have examined the patient and reviewed the H&P/Consult and there are no changes to the patient or plans.     [] I have examined the patient and reviewed the H&P/Consult and have noted the following changes:        Rosales Ralph MD   Electronically signed 1/18/2022 at 8:54 AM

## 2022-01-18 NOTE — OP NOTE
Operative Note      Patient: Carina Levine  YOB: 1990  MRN: [id number]    Date of Procedure: 1/18/2022    Pre-Op Diagnosis: Left 5th Metatarsal Fracture    Post-Op Diagnosis: Same       Procedure: Percutaneous stabilization and reduction of a left fifth metatarsal fracture    Surgeon(s):  James No MD    Assistant:   Physician Assistant: Adriana Daily PA-C    Anesthesia: General    Estimated Blood Loss (mL): Minimal    Complications: None    Specimens:   * No specimens in log *    Implants:  * No implants in log *      Drains: * No LDAs found *    Findings: confirmed    Detailed Description of Procedure: Indications  This is a 76-year-old sustained a left fifth metatarsal distal fracture. Significant displaced. Felt he would benefit from operative invention while stabilization. We discussed plating versus percutaneously pinning. Patient agreed. Narrative  Patient the operating underwent general anesthetic. He received 2 g of Ancef. The left lower extremity was prepped draped normal sterile fashion. Timeout was taken consent was confirmed. Start with bringing C-arm into position. 2 small stab wounds were created on the fracture site and a pointed reduction clamp was utilized to reduce the fracture near anatomic. We initially tried an antegrade pin through the tuberosity of the fifth metatarsal.  Unfortunate was not successful. We then went in a retrograde format at the edge of the articular margin of the head of the fifth metatarsal.  This was then passed across the fracture site and then engaged in the base of the fifth metatarsal.  A pointed reduction was removed. Reduction was maintained. AP and lateral images demonstrate to be in satisfactory alignment. Pin was bent and Tiffanie balls was placed. Injected local anesthetic dry dressings were applied. Patient is then awakened returned recovery in good condition.     Postoperative plan  Weightbearing as tolerated to the heel.  Nonweightbearing through the foot itself. We will see him in the office in 3 weeks x-rays 3 views of the foot dry dressings as necessary. Keep the pin clean and dry.     Electronically signed by Yani Ortiz MD on 1/18/2022 at 11:09 AM

## 2022-01-18 NOTE — LETTER
[facility]  OR  [address] [place] [address]  Phone: [phone]    No name on file. January 18, 2022     Patient: Mandeep Nelson   YOB: 1990   Date of Visit: 1/18/2022       To Whom It May Concern:    Please excuse Carina Nelson from work on Tuesday January 18 as he had surgery at [facility]. If you have any questions or concerns, please don't hesitate to call.     Sincerely,        Dr. Karri Raygoza MD

## 2022-01-18 NOTE — PROGRESS NOTES
History and Physical        CHIEF COMPLAINT: Left fifth metatarsal fracture    HISTORY OF PRESENT ILLNESS:      The patient is a 28 y.o. male  who presents with left fifth metatarsal fracture. Patient stated that he went to go get his baby as the baby was falling off of the bed. Began having increasing pain on his lateral aspect of the left foot. Denies any previous injuries to this foot. Does have a history of recent COVID infection approximately 2 weeks ago. Does have a history of Crohn's    Past Medical History:    Past Medical History:   Diagnosis Date    Crohn's colitis ([facility] [address]        Past Surgical History:    Past Surgical History:   Procedure Laterality Date    COLON SURGERY      HERNIA REPAIR         Medications Prior to Admission:   Prior to Admission medications    Medication Sig Start Date End Date Taking? Authorizing Provider   promethazine (PHENERGAN) 6.25 MG/5ML syrup Take 20 mLs by mouth 4 times daily as needed for Nausea 12/9/21  Yes Michelle Coreas MD   amLODIPine (NORVASC) 2.5 MG tablet Take 1 tablet by mouth daily 12/6/21  Yes Michelle Coreas MD    Scheduled Meds:  Continuous Infusions:  PRN Meds:. Allergies:  Patient has no known allergies.     Social History:   Social History     Socioeconomic History    Marital status: Single     Spouse name: None    Number of children: None    Years of education: None    Highest education level: None   Occupational History    None   Tobacco Use    Smoking status: Current Every Day Smoker     Packs/day: 0.50     Types: Cigarettes    Smokeless tobacco: Never Used   Vaping Use    Vaping Use: Never used   Substance and Sexual Activity    Alcohol use: Yes     Comment: socially    Drug use: Not Currently    Sexual activity: None   Other Topics Concern    None   Social History Narrative    None     Social Determinants of Health     Financial Resource Strain: Low Risk     Difficulty of Paying Living Expenses: Not hard at all   Food Insecurity: No Food Insecurity    Worried About Running Out of Food in the Last Year: Never true    Ran Out of Food in the Last Year: Never true   Transportation Needs: No Transportation Needs    Lack of Transportation (Medical): No    Lack of Transportation (Non-Medical):  No   Physical Activity:     Days of Exercise per Week: Not on file    Minutes of Exercise per Session: Not on file   Stress:     Feeling of Stress : Not on file   Social Connections:     Frequency of Communication with Friends and Family: Not on file    Frequency of Social Gatherings with Friends and Family: Not on file    Attends Jewish Services: Not on file    Active Member of [address] [place] [address] [employer] or Organizations: Not on file    Attends Club or Organization Meetings: Not on file    Marital Status: Not on file   Intimate Partner Violence:     Fear of Current or Ex-Partner: Not on file    Emotionally Abused: Not on file    Physically Abused: Not on file    Sexually Abused: Not on file   Housing Stability:     Unable to Pay for Housing in the Last Year: Not on file    Number of [place] in the Last Year: Not on file    Unstable Housing in the Last Year: Not on file     Social History     Tobacco Use   Smoking Status Current Every Day Smoker    Packs/day: 0.50    Types: Cigarettes   Smokeless Tobacco Never Used     Social History     Substance and Sexual Activity   Alcohol Use Yes    Comment: socially     Social History     Substance and Sexual Activity   Drug Use Not Currently       Family History:  Family History   Problem Relation Age of Onset    Hypertension Father     Crohn's Disease Maternal Aunt          REVIEW OF SYSTEMS:  Gen: Negative for nausea, vomiting, diarrhea, fever, chills, night sweats  Heart: Negative for HTN, palpitations, chest pain  Lungs: Negative for wheezes, asthma or SOB  GI: Negative for nausea, vomiting  Endo: Negative for diabetes  Heme: Negative for DVT       PHYSICAL EXAM:  [unfilled]  Gen: alert and oriented  Head: normorcephalic, atraumatic  Neck: supple  Heart: RRR  Lungs: No audible wheezes  Abdomen: soft  Pelvis: stable  Extremity left foot lateral aspect pain palpation he is able to flex extend toes of symptoms swelling went to the lateral aspect of the foot. DATA:  CBC:   Lab Results   Component Value Date    WBC 10.8 10/24/2021    HGB 13.7 10/24/2021     10/24/2021     BMP:    Lab Results   Component Value Date     10/24/2021    K 3.7 10/24/2021    CL 97 10/24/2021    CO2 28 10/24/2021    BUN 6 10/24/2021    CREATININE 0.85 10/24/2021    CALCIUM 9.7 10/24/2021    GLUCOSE 107 10/24/2021     PT/INR:    Lab Results   Component Value Date    PROTIME 12.8 10/24/2021    INR 1.0 10/24/2021     Troponin:  No results found for: Ambrosio Debbie    Radiology: Displaced shortened fifth metatarsal fracture shaft noted on 3 views    ASSESSMENT:Active Problems:    * No active hospital problems. *  Resolved Problems:    * No resolved hospital problems.  *       PLAN:  1) as discussed with Dr. Annia Burnett proceed with open reduction internal fixation left fifth metatarsal fracture risk and benefits were discussed with patient patient like to proceed        Indiana Mac PA-C

## 2022-01-18 NOTE — ANESTHESIA POSTPROCEDURE EVALUATION
Department of Anesthesiology  Postprocedure Note    Patient: Brittany Calderon  MRN: [id number]  YOB: 1990  Date of evaluation: 1/18/2022  Time:  3:12 PM     Procedure Summary     Date: 01/18/22 Room / Location: [address] [place] [address]    Anesthesia Start: 1036 Anesthesia Stop: 1132    Procedure: ORIF Left 5th Metatarsal (Left ) Diagnosis: (Left 5th Metatarsal Fracture)    Surgeons: Mercedez Jaramillo MD Responsible Provider: Barry Bernheim, APRN - CRNA    Anesthesia Type: general, TIVA ASA Status: 2          Anesthesia Type: general, TIVA    Farzana Phase I: Farzana Score: 10    Farzana Phase II: Afrzana Score: 10    Last vitals: Reviewed and per EMR flowsheets.        Anesthesia Post Evaluation    Patient location during evaluation: PACU  Patient participation: complete - patient participated  Level of consciousness: awake and alert  Pain score: 4  Airway patency: patent  Nausea & Vomiting: no nausea and no vomiting  Complications: no  Cardiovascular status: blood pressure returned to baseline and hemodynamically stable  Respiratory status: acceptable, spontaneous ventilation and room air  Hydration status: euvolemic

## 2022-01-18 NOTE — ANESTHESIA PRE PROCEDURE
Department of Anesthesiology  Preprocedure Note       Name:  Wayne Machado   Age:  28 y.o.  :  1990                                          MRN:  [id number]         Date:  2022      Surgeon: True Mccall):  Frantz Lomeli MD    Procedure: Procedure(s):  nv(Covid+22)ORIF Left 5th Metatarsal (office @ 0830)    Medications prior to admission:   Prior to Admission medications    Medication Sig Start Date End Date Taking?  Authorizing Provider   promethazine (PHENERGAN) 6.25 MG/5ML syrup Take 20 mLs by mouth 4 times daily as needed for Nausea 21  Yes Alok Barth MD   amLODIPine (NORVASC) 2.5 MG tablet Take 1 tablet by mouth daily 21  Yes Alok Barth MD       Current medications:    Current Facility-Administered Medications   Medication Dose Route Frequency Provider Last Rate Last Admin    sodium chloride flush 0.9 % injection 5-40 mL  5-40 mL IntraVENous PRN Frantz Lomeli MD        lactated ringers infusion   IntraVENous Continuous Frantz Lomeli MD        ceFAZolin (ANCEF) 3000 mg in dextrose 5 % 100 mL IVPB  3,000 mg IntraVENous On Call to Juan Workman MD        fentaNYL (SUBLIMAZE) injection 25 mcg  25 mcg IntraVENous Q5 Min PRN ALIDA Reeves CNP        morphine (PF) injection 2 mg  2 mg IntraVENous Q5 Min PRN ALIDA Reeves CNP        HYDROmorphone (DILAUDID) injection 0.25 mg  0.25 mg IntraVENous Q5 Min PRN ALIDA Reeves CNP        fentaNYL (SUBLIMAZE) injection 50 mcg  50 mcg IntraVENous Q5 Min PRN ALIDA Reeves CNP        oxyCODONE (ROXICODONE) immediate release tablet 5 mg  5 mg Oral PRN ALIDA Reeves CNP        Or    oxyCODONE (ROXICODONE) immediate release tablet 10 mg  10 mg Oral PRN ALIDA Reeves CNP        ondansetron (ZOFRAN) injection 4 mg  4 mg IntraVENous PRN ALIDA Reeves CNP        diphenhydrAMINE (BENADRYL) injection 12.5 mg  12.5 mg IntraVENous Once PRN ALIDA Pierson CNP           Allergies:  No Known Allergies    Problem List:  There is no problem list on file for this patient. Past Medical History:        Diagnosis Date    Crohn's colitis [facility])        Past Surgical History:        Procedure Laterality Date    COLON SURGERY      HERNIA REPAIR         Social History:    Social History     Tobacco Use    Smoking status: Current Every Day Smoker     Packs/day: 0.50     Types: Cigarettes    Smokeless tobacco: Never Used   Substance Use Topics    Alcohol use: Yes     Comment: socially                                Ready to quit: Not Answered  Counseling given: Not Answered      Vital Signs (Current): There were no vitals filed for this visit.                                            BP Readings from Last 3 Encounters:   01/18/22 (!) 150/98   01/05/22 (!) 174/112   12/21/21 136/78       NPO Status: Time of last liquid consumption: 2230                        Time of last solid consumption: 2230                        Date of last liquid consumption: 01/17/22                        Date of last solid food consumption: 01/17/22    BMI:   Wt Readings from Last 3 Encounters:   01/18/22 270 lb (122.5 kg)   01/05/22 270 lb (122.5 kg)   12/21/21 277 lb (125.6 kg)     There is no height or weight on file to calculate BMI.    CBC:   Lab Results   Component Value Date    WBC 10.8 10/24/2021    RBC 4.79 10/24/2021    HGB 13.7 10/24/2021    HCT 40.5 10/24/2021    MCV 84.6 10/24/2021    RDW 13.2 10/24/2021     10/24/2021       CMP:   Lab Results   Component Value Date     10/24/2021    K 3.7 10/24/2021    CL 97 10/24/2021    CO2 28 10/24/2021    BUN 6 10/24/2021    CREATININE 0.85 10/24/2021    GFRAA >60 10/24/2021    LABGLOM >60 10/24/2021    GLUCOSE 107 10/24/2021    PROT 7.1 08/08/2021    CALCIUM 9.7 10/24/2021    BILITOT 0.54 08/08/2021    ALKPHOS 95 08/08/2021    AST 16 08/08/2021    ALT 39 08/08/2021       POC Tests: No results for input(s): POCGLU, POCNA, POCK, POCCL, POCBUN, POCHEMO, POCHCT in the last 72 hours. Coags:   Lab Results   Component Value Date    PROTIME 12.8 10/24/2021    INR 1.0 10/24/2021    APTT 30.7 10/24/2021       HCG (If Applicable): No results found for: PREGTESTUR, PREGSERUM, HCG, HCGQUANT     ABGs: No results found for: PHART, PO2ART, DNE6EXS, TSY6IFX, BEART, U7NEQOJL     Type & Screen (If Applicable):  No results found for: LABABO, LABRH    Drug/Infectious Status (If Applicable):  No results found for: HIV, HEPCAB    COVID-19 Screening (If Applicable):   Lab Results   Component Value Date    COVID19 DETECTED 01/05/2022           Anesthesia Evaluation  Patient summary reviewed and Nursing notes reviewed no history of anesthetic complications:   Airway: Mallampati: II  TM distance: >3 FB   Neck ROM: full  Mouth opening: > = 3 FB Dental: normal exam         Pulmonary:normal exam    (+) current smoker          Patient did not smoke on day of surgery. Cardiovascular:  Exercise tolerance: good (>4 METS),   (+) hypertension:,     (-)  angina      Rhythm: regular  Rate: normal           Beta Blocker:  Not on Beta Blocker         Neuro/Psych:   Negative Neuro/Psych ROS              GI/Hepatic/Renal: Neg GI/Hepatic/Renal ROS            Endo/Other: Negative Endo/Other ROS                    Abdominal:             Vascular: negative vascular ROS. Other Findings:             Anesthesia Plan      general and TIVA     ASA 2       Induction: intravenous. MIPS: Postoperative opioids intended and Prophylactic antiemetics administered. Anesthetic plan and risks discussed with patient.       Plan discussed with surgical team.                  ALIDA Kenney - CRNA   1/18/2022

## 2022-01-18 NOTE — PROGRESS NOTES
CLINICAL PHARMACY NOTE: MEDS TO BEDS    Total # of Prescriptions Filled: 1   The following medications were delivered to the patient:  · Hydrocodone/APAP 5/325mg    Additional Documentation:

## 2022-01-19 ENCOUNTER — TELEPHONE (OUTPATIENT)
Dept: ORTHOPEDIC SURGERY | Age: 32
End: 2022-01-19

## 2022-01-19 DIAGNOSIS — R11.0 NAUSEA: ICD-10-CM

## 2022-01-19 DIAGNOSIS — I10 HYPERTENSION, UNSPECIFIED TYPE: ICD-10-CM

## 2022-01-19 NOTE — TELEPHONE ENCOUNTER
Pr- op called down asking if Patient could get a prescription of percocet.  Patient called them and said stated the Prudence Pauline is not helping with the pain

## 2022-01-19 NOTE — TELEPHONE ENCOUNTER
Alma Corona is requesting a refill on the following medication(s):  Requested Prescriptions     Pending Prescriptions Disp Refills    amLODIPine (NORVASC) 2.5 MG tablet 90 tablet 1     Sig: Take 1 tablet by mouth daily       Last Visit Date (If Applicable):  20/96/9787    Next Visit Date:    3/7/2022

## 2022-01-19 NOTE — PROGRESS NOTES
1/19/22 @ 1020 -Patient returned a call after receiving a message regarding our post op call. Patient states his pain is not controlled with the Hydrocodone/Acetaminophen. Patient states he has taken 8 Norco since he left yesterday. Patient requesting Percocet for his pain. Writer contacted Lamar Apley, [facility] and reported above conversation. Lamar Apley would like patient to continue with Hydrocodone/Acetaminophen and to ice & elevate his foot. If his pain does not get better then he needs to contact Dr. Wiliam Gonzalez office. Writer instructed patient of the above conversation. Also instructed patient to take 500 mg of Tylenol with his next dose of Hydrocodone/Acetaminophen to see if that will help. Writer instructed patient not exceed more than 4000 mg of Acetaminophen in a 24 hr period. Pt verbalized understanding.

## 2022-01-20 RX ORDER — AMLODIPINE BESYLATE 2.5 MG/1
2.5 TABLET ORAL DAILY
Qty: 90 TABLET | Refills: 0 | Status: SHIPPED | OUTPATIENT
Start: 2022-01-20 | End: 2022-02-07 | Stop reason: SDUPTHER

## 2022-01-20 RX ORDER — PROMETHAZINE HYDROCHLORIDE 6.25 MG/5ML
25 SYRUP ORAL 4 TIMES DAILY PRN
Qty: 300 ML | Refills: 2 | Status: SHIPPED | OUTPATIENT
Start: 2022-01-20 | End: 2022-02-07

## 2022-01-24 ENCOUNTER — TELEPHONE (OUTPATIENT)
Dept: ORTHOPEDIC SURGERY | Age: 32
End: 2022-01-24

## 2022-01-24 DIAGNOSIS — S92.355A CLOSED NONDISPLACED FRACTURE OF FIFTH METATARSAL BONE OF LEFT FOOT, INITIAL ENCOUNTER: Primary | ICD-10-CM

## 2022-01-24 RX ORDER — HYDROCODONE BITARTRATE AND ACETAMINOPHEN 5; 325 MG/1; MG/1
1 TABLET ORAL EVERY 4 HOURS PRN
Qty: 30 TABLET | Refills: 0 | Status: SHIPPED | OUTPATIENT
Start: 2022-01-24 | End: 2022-01-31

## 2022-01-24 NOTE — TELEPHONE ENCOUNTER
Patient called asked if he could get a refill of [address] patient said he would like it sent to [address] [facility].

## 2022-01-31 ENCOUNTER — TELEPHONE (OUTPATIENT)
Dept: ORTHOPEDIC SURGERY | Age: 32
End: 2022-01-31

## 2022-01-31 DIAGNOSIS — S92.355A CLOSED NONDISPLACED FRACTURE OF FIFTH METATARSAL BONE OF LEFT FOOT, INITIAL ENCOUNTER: Primary | ICD-10-CM

## 2022-01-31 NOTE — TELEPHONE ENCOUNTER
Patient called and asked if he could get a refill of Norco if so he would like it sent to [facility]

## 2022-02-01 RX ORDER — HYDROCODONE BITARTRATE AND ACETAMINOPHEN 5; 325 MG/1; MG/1
1 TABLET ORAL EVERY 4 HOURS PRN
Qty: 30 TABLET | Refills: 0 | Status: SHIPPED | OUTPATIENT
Start: 2022-02-01 | End: 2022-02-08 | Stop reason: ALTCHOICE

## 2022-02-02 DIAGNOSIS — S92.355A CLOSED NONDISPLACED FRACTURE OF FIFTH METATARSAL BONE OF LEFT FOOT, INITIAL ENCOUNTER: Primary | ICD-10-CM

## 2022-02-04 DIAGNOSIS — R11.0 NAUSEA: ICD-10-CM

## 2022-02-04 NOTE — TELEPHONE ENCOUNTER
Roxy Agustin called requesting a refill of the below medication which has been pended for you:     Requested Prescriptions     Pending Prescriptions Disp Refills    promethazine (PHENERGAN) 6.25 MG/5ML syrup [Pharmacy Med Name: PROMETHAZINE HCL 6.25MG/5ML SYRP] 300 mL 2     Sig: TAKE 20MLS BY MOUTH FOUR TIMES A DAY AS NEEDED FOR NAUSEA       Last Appointment Date: 12/13/2021  Next Appointment Date: 3/7/2022    No Known Allergies

## 2022-02-07 DIAGNOSIS — I10 HYPERTENSION, UNSPECIFIED TYPE: ICD-10-CM

## 2022-02-07 RX ORDER — PROMETHAZINE HYDROCHLORIDE 6.25 MG/5ML
SYRUP ORAL
Qty: 300 ML | Refills: 2 | Status: SHIPPED | OUTPATIENT
Start: 2022-02-07 | End: 2022-03-07 | Stop reason: SDUPTHER

## 2022-02-07 NOTE — TELEPHONE ENCOUNTER
Bebeto Nick called requesting a refill of the below medication which has been pended for you:     Requested Prescriptions     Pending Prescriptions Disp Refills    amLODIPine (NORVASC) 2.5 MG tablet 90 tablet 0     Sig: Take 1 tablet by mouth daily       Last Appointment Date: 12/13/2021  Next Appointment Date: 3/7/2022    No Known Allergies

## 2022-02-08 ENCOUNTER — HOSPITAL ENCOUNTER (OUTPATIENT)
Dept: GENERAL RADIOLOGY | Age: 32
Discharge: HOME | End: 2022-02-10
Payer: MEDICAID

## 2022-02-08 ENCOUNTER — OFFICE VISIT (OUTPATIENT)
Dept: ORTHOPEDIC SURGERY | Age: 32
End: 2022-02-08
Payer: MEDICAID

## 2022-02-08 VITALS
BODY MASS INDEX: 33.06 KG/M2 | DIASTOLIC BLOOD PRESSURE: 80 MMHG | HEART RATE: 84 BPM | WEIGHT: 280 LBS | OXYGEN SATURATION: 98 % | SYSTOLIC BLOOD PRESSURE: 138 MMHG | HEIGHT: 77 IN

## 2022-02-08 DIAGNOSIS — S92.355A CLOSED NONDISPLACED FRACTURE OF FIFTH METATARSAL BONE OF LEFT FOOT, INITIAL ENCOUNTER: Primary | ICD-10-CM

## 2022-02-08 DIAGNOSIS — S92.355A CLOSED NONDISPLACED FRACTURE OF FIFTH METATARSAL BONE OF LEFT FOOT, INITIAL ENCOUNTER: ICD-10-CM

## 2022-02-08 PROCEDURE — 99212 OFFICE O/P EST SF 10 MIN: CPT | Performed by: PHYSICIAN ASSISTANT

## 2022-02-08 PROCEDURE — 99024 POSTOP FOLLOW-UP VISIT: CPT | Performed by: PHYSICIAN ASSISTANT

## 2022-02-08 PROCEDURE — 73630 X-RAY EXAM OF FOOT: CPT

## 2022-02-08 RX ORDER — HYDROCODONE BITARTRATE AND ACETAMINOPHEN 5; 325 MG/1; MG/1
1 TABLET ORAL EVERY 4 HOURS PRN
Qty: 42 TABLET | Refills: 0 | Status: SHIPPED | OUTPATIENT
Start: 2022-02-08 | End: 2022-02-15

## 2022-02-08 RX ORDER — AMLODIPINE BESYLATE 2.5 MG/1
2.5 TABLET ORAL DAILY
Qty: 90 TABLET | Refills: 0 | Status: SHIPPED | OUTPATIENT
Start: 2022-02-08 | End: 2022-02-12 | Stop reason: SDUPTHER

## 2022-02-08 NOTE — PROGRESS NOTES
Orthopaedic Progress Note      CHIEF COMPLAINT: Left fifth metatarsal fracture status post percutaneous pinning procedure 1/18/2022    HISTORY OF PRESENT ILLNESS:       Mr. Rian Pretty  is a 28 y.o. male  who presents with patient doing really well at this time not having new issues or concerns. Currently utilizing a postop shoe. Currently taking Norco for pain. Past Medical History:    Past Medical History:   Diagnosis Date    Crohn's colitis [facility])        Past Surgical History:    Past Surgical History:   Procedure Laterality Date    COLON SURGERY      FOOT SURGERY Left 1/18/2022    ORIF Left 5th Metatarsal performed by Enrique Bueno MD at [place] [address]           Current  Medications:  Current Outpatient Medications   Medication Sig Dispense Refill    HYDROcodone-acetaminophen (NORCO) 5-325 MG per tablet Take 1 tablet by mouth every 4 hours as needed for Pain for up to 7 days. Intended supply: 7 days. Take lowest dose possible to manage pain 42 tablet 0    promethazine (PHENERGAN) 6.25 MG/5ML syrup TAKE 20MLS BY MOUTH FOUR TIMES A DAY AS NEEDED FOR NAUSEA 300 mL 2    amLODIPine (NORVASC) 2.5 MG tablet Take 1 tablet by mouth daily 90 tablet 0     No current facility-administered medications for this visit. Allergies:  Patient has no known allergies. Social History:   Social History     Tobacco Use   Smoking Status Current Every Day Smoker    Packs/day: 0.50    Types: Cigarettes   Smokeless Tobacco Never Used     Social History     Substance and Sexual Activity   Alcohol Use Yes    Comment: socially     Social History     Substance and Sexual Activity   Drug Use Not Currently       Family History:  Family History   Problem Relation Age of Onset    Hypertension Father     Crohn's Disease Maternal Aunt        REVIEW OF SYSTEMS:  Constitutional: Denies any fever, chills. Musculoskeletal: Positive for left fifth metatarsal fracture.       PHYSICAL EXAM:  [unfilled]  General appearance:  Alert and oriented x 3. No apparent distress, appears stated age, calm and cooperative. Musculoskeletal: Pin site no drainage noted still noted pain over the lateral aspect of the foot. Len Puga DATA:  CBC:   Lab Results   Component Value Date    WBC 10.8 10/24/2021    HGB 13.7 10/24/2021     10/24/2021     BMP:    Lab Results   Component Value Date     10/24/2021    K 3.7 10/24/2021    CL 97 10/24/2021    CO2 28 10/24/2021    BUN 6 10/24/2021    CREATININE 0.85 10/24/2021    CALCIUM 9.7 10/24/2021    GLUCOSE 107 10/24/2021     PT/INR:    Lab Results   Component Value Date    PROTIME 12.8 10/24/2021    INR 1.0 10/24/2021     Troponin:  No results found for: TROPONINI  No results for input(s): LIPASE, AMYLASE in the last 72 hours. No results for input(s): AST, ALT, BILIDIR, BILITOT, ALKPHOS in the last 72 hours. Uric Acid:  No components found for: URIC  Urine Culture:  No components found for: CURINE    Radiology:   XR FOOT LEFT (MIN 3 VIEWS)    Result Date: 2/8/2022  EXAMINATION: THREE XRAY VIEWS OF THE LEFT FOOT 2/8/2022 8:47 am COMPARISON: January 18, 2022 HISTORY: ORDERING SYSTEM PROVIDED HISTORY: Closed nondisplaced fracture of fifth metatarsal bone of left foot, initial encounter TECHNOLOGIST PROVIDED HISTORY: Reason for Exam: F/u orif FINDINGS: Interval pin stabilization 5th metatarsal fracture. No change in overall alignment or displacement of fracture. No acute interval change from previous. Soft tissues unremarkable. S/p pin stabilization 5th metatarsal fracture which appears overall stable.      XR FOOT LEFT (MIN 3 VIEWS)    Result Date: 1/18/2022  EXAMINATION: THREE XRAY VIEWS OF THE LEFT FOOT 1/18/2022 8:07 am COMPARISON: 01/05/2022 HISTORY: ORDERING SYSTEM PROVIDED HISTORY: Fracture Reason for Exam: Left 5th metatarsal fracture after fall FINDINGS: Redemonstration of mildly displaced comminuted oblique fracture of the mid to distal 5th metatarsal.  No evidence of healing or significant interval change. No additional fracture. No dislocation. There is normal alignment of the tarsometatarsal joints. No acute joint abnormality. No focal osseous lesion. No focal soft tissue abnormality. Redemonstration of mildly displaced comminuted oblique fracture of the mid to distal 5th metatarsal without evidence of healing or significant interval change. FLUORO FOR SURGICAL PROCEDURES    Result Date: 1/18/2022  Radiology exam is complete. No Radiologist dictation. Please follow up with ordering provider. X-rays personally reviewed by me of no significant callus formation is noted on the lateral aspect fifth metatarsal.  Noted on 3 views       Diagnosis Orders   1. Closed nondisplaced fracture of fifth metatarsal bone of left foot, initial encounter  HYDROcodone-acetaminophen (NORCO) 5-325 MG per tablet         PLAN:  This time feel this patient benefit from continued percutaneous pinning 1 follow-up with us in 2 weeks repeat x-rays of this left foot at that time if there is healing taking place we can go ahead and pull his pain at that time he will be 6 weeks out continue with the postop shoe prescription for Norco    No orders of the defined types were placed in this encounter.        Procedure:        Electronically signed by Violetta Gillette PA-C on 2/8/2022 at 9:25 AM

## 2022-02-11 DIAGNOSIS — I10 HYPERTENSION, UNSPECIFIED TYPE: ICD-10-CM

## 2022-02-11 NOTE — TELEPHONE ENCOUNTER
Pt states he went to pick this up at the pharmacy but they said it's too early, but pt states he's taking 2.5mg bid instead of 1 daily as he states Dr Gertrudis Donnelly told him he would take 2 if the 1 wasn't working, please contact clinic pharmacy with dose change if pt is to take bid and advise pt of correct dose.

## 2022-02-11 NOTE — TELEPHONE ENCOUNTER
Normangee Patches called requesting a refill of the below medication which has been pended for you:     Requested Prescriptions     Pending Prescriptions Disp Refills    amLODIPine (NORVASC) 2.5 MG tablet 90 tablet 0     Sig: Take 1 tablet by mouth daily       Last Appointment Date: 12/13/2021  Next Appointment Date: 3/7/2022    No Known Allergies

## 2022-02-12 RX ORDER — AMLODIPINE BESYLATE 2.5 MG/1
2.5 TABLET ORAL DAILY
Qty: 90 TABLET | Refills: 0 | Status: SHIPPED | OUTPATIENT
Start: 2022-02-12

## 2022-02-14 ENCOUNTER — TELEPHONE (OUTPATIENT)
Dept: ORTHOPEDIC SURGERY | Age: 32
End: 2022-02-14

## 2022-02-14 DIAGNOSIS — S92.355A CLOSED NONDISPLACED FRACTURE OF FIFTH METATARSAL BONE OF LEFT FOOT, INITIAL ENCOUNTER: Primary | ICD-10-CM

## 2022-02-14 NOTE — TELEPHONE ENCOUNTER
Patient called and asked for a refill of Norco. Patient would like that refill sent to [facility] pharmacy

## 2022-02-15 RX ORDER — HYDROCODONE BITARTRATE AND ACETAMINOPHEN 5; 325 MG/1; MG/1
1 TABLET ORAL EVERY 4 HOURS PRN
Qty: 30 TABLET | Refills: 0 | Status: SHIPPED | OUTPATIENT
Start: 2022-02-15 | End: 2022-02-22

## 2022-02-16 DIAGNOSIS — S92.355D CLOSED NONDISPLACED FRACTURE OF FIFTH METATARSAL BONE OF LEFT FOOT WITH ROUTINE HEALING, SUBSEQUENT ENCOUNTER: Primary | ICD-10-CM

## 2022-02-22 ENCOUNTER — OFFICE VISIT (OUTPATIENT)
Dept: ORTHOPEDIC SURGERY | Age: 32
End: 2022-02-22
Payer: MEDICAID

## 2022-02-22 ENCOUNTER — HOSPITAL ENCOUNTER (OUTPATIENT)
Dept: GENERAL RADIOLOGY | Age: 32
Discharge: HOME | End: 2022-02-24
Payer: MEDICAID

## 2022-02-22 VITALS
BODY MASS INDEX: 33.06 KG/M2 | HEART RATE: 64 BPM | HEIGHT: 77 IN | WEIGHT: 280 LBS | SYSTOLIC BLOOD PRESSURE: 132 MMHG | DIASTOLIC BLOOD PRESSURE: 80 MMHG

## 2022-02-22 DIAGNOSIS — S92.355D CLOSED NONDISPLACED FRACTURE OF FIFTH METATARSAL BONE OF LEFT FOOT WITH ROUTINE HEALING, SUBSEQUENT ENCOUNTER: ICD-10-CM

## 2022-02-22 DIAGNOSIS — S92.355D CLOSED NONDISPLACED FRACTURE OF FIFTH METATARSAL BONE OF LEFT FOOT WITH ROUTINE HEALING, SUBSEQUENT ENCOUNTER: Primary | ICD-10-CM

## 2022-02-22 PROCEDURE — 99212 OFFICE O/P EST SF 10 MIN: CPT | Performed by: PHYSICIAN ASSISTANT

## 2022-02-22 PROCEDURE — 99024 POSTOP FOLLOW-UP VISIT: CPT | Performed by: PHYSICIAN ASSISTANT

## 2022-02-22 PROCEDURE — 73630 X-RAY EXAM OF FOOT: CPT

## 2022-02-22 RX ORDER — HYDROCODONE BITARTRATE AND ACETAMINOPHEN 5; 325 MG/1; MG/1
1 TABLET ORAL EVERY 6 HOURS PRN
Qty: 30 TABLET | Refills: 0 | Status: SHIPPED | OUTPATIENT
Start: 2022-02-22 | End: 2022-03-01

## 2022-02-22 NOTE — PROGRESS NOTES
Orthopaedic Progress Note      CHIEF COMPLAINT: Status post percutaneous pinning of a left foot fifth metatarsal shaft fracture    HISTORY OF PRESENT ILLNESS:       Mr. Cinthia Miller  is a 28 y.o. male  who presents with a history of a left foot fifth metatarsal fracture treated with a percutaneous pinning on 1/18/2022. Comes in today still complaining of pain. Is been nonweightbearing. States he is got no drainage from the pin site. He is here today for reevaluation      Past Medical History:    Past Medical History:   Diagnosis Date    Crohn's colitis [facility])        Past Surgical History:    Past Surgical History:   Procedure Laterality Date    COLON SURGERY      FOOT SURGERY Left 1/18/2022    ORIF Left 5th Metatarsal performed by Chaz Morgan MD at [facility]           Current  Medications:  Current Outpatient Medications   Medication Sig Dispense Refill    HYDROcodone-acetaminophen (NORCO) 5-325 MG per tablet Take 1 tablet by mouth every 4 hours as needed for Pain for up to 7 days. Intended supply: 7 days. Take lowest dose possible to manage pain 30 tablet 0    amLODIPine (NORVASC) 2.5 MG tablet Take 1 tablet by mouth daily 90 tablet 0    promethazine (PHENERGAN) 6.25 MG/5ML syrup TAKE 20MLS BY MOUTH FOUR TIMES A DAY AS NEEDED FOR NAUSEA 300 mL 2     No current facility-administered medications for this visit. Allergies:  Patient has no known allergies.     Social History:   Social History     Tobacco Use   Smoking Status Current Every Day Smoker    Packs/day: 0.50    Types: Cigarettes   Smokeless Tobacco Never Used     Social History     Substance and Sexual Activity   Alcohol Use Yes    Comment: socially     Social History     Substance and Sexual Activity   Drug Use Not Currently       Family History:  Family History   Problem Relation Age of Onset    Hypertension Father     Crohn's Disease Maternal Aunt        REVIEW OF SYSTEMS:  Constitutional: Denies any fever, chills. Musculoskeletal: Positive for left foot fifth metatarsal fracture treated operatively 1 month ago. PHYSICAL EXAM:  [unfilled]  General appearance:  Alert and oriented x 3. No apparent distress, appears stated age, calm and cooperative. Musculoskeletal: Left foot was inspected out of splint. Percutaneous pin is in good position. There is no signs of any pin tract infection at this time he has no drainage around the pin site. Len Puga DATA:  CBC:   Lab Results   Component Value Date    WBC 10.8 10/24/2021    HGB 13.7 10/24/2021     10/24/2021     BMP:    Lab Results   Component Value Date     10/24/2021    K 3.7 10/24/2021    CL 97 10/24/2021    CO2 28 10/24/2021    BUN 6 10/24/2021    CREATININE 0.85 10/24/2021    CALCIUM 9.7 10/24/2021    GLUCOSE 107 10/24/2021     PT/INR:    Lab Results   Component Value Date    PROTIME 12.8 10/24/2021    INR 1.0 10/24/2021     Troponin:  No results found for: TROPONINI  No results for input(s): LIPASE, AMYLASE in the last 72 hours. No results for input(s): AST, ALT, BILIDIR, BILITOT, ALKPHOS in the last 72 hours. Uric Acid:  No components found for: URIC  Urine Culture:  No components found for: CURINE    Radiology:   XR FOOT LEFT (MIN 3 VIEWS)    Result Date: 2/22/2022  EXAMINATION: THREE XRAY VIEWS OF THE LEFT FOOT 2/22/2022 9:49 am COMPARISON: February 8, 2022 HISTORY: ORDERING SYSTEM PROVIDED HISTORY: Closed nondisplaced fracture of fifth metatarsal bone of left foot with routine healing, subsequent encounter TECHNOLOGIST PROVIDED HISTORY: Reason for Exam: Follow up for left 5th metatarsal fracture FINDINGS: Percutaneous pin stabilizing 5th metatarsal fracture unchanged in alignment or position with stable alignment of oblique 5th metatarsal fracture. No interval change overall. Soft tissues unremarkable.      Stable exam     XR FOOT LEFT (MIN 3 VIEWS)    Result Date: 2/8/2022  EXAMINATION: THREE XRAY VIEWS OF THE LEFT FOOT 2/8/2022 8:47 am COMPARISON: January 18, 2022 HISTORY: ORDERING SYSTEM PROVIDED HISTORY: Closed nondisplaced fracture of fifth metatarsal bone of left foot, initial encounter TECHNOLOGIST PROVIDED HISTORY: Reason for Exam: F/u orif FINDINGS: Interval pin stabilization 5th metatarsal fracture. No change in overall alignment or displacement of fracture. No acute interval change from previous. Soft tissues unremarkable. S/p pin stabilization 5th metatarsal fracture which appears overall stable. X-rays personally reviewed by me of 3 views left foot reveal a long oblique fifth metatarsal shaft fracture in reasonably good over alignment. There is minimal evidence of radiographic bone healing compared to x-rays from 3 weeks prior. Diagnosis Orders   1. Closed nondisplaced fracture of fifth metatarsal bone of left foot with routine healing, subsequent encounter           PLAN:  Do feel that we should try to get another 2 weeks out this percutaneous pin. He does not have much radiographic evidence of bone healing. Refill his pain medication. Weightbearing as tolerated through his heel only. Continue with a fracture shoe. Preclinical x-rays 3 views left foot at the next visit those look like we have got a little bit more bone healing we will pull the pin    No orders of the defined types were placed in this encounter.        Procedure:        Electronically signed by Quique Dennis PA-C on 2/22/2022 at 10:21 AM

## 2022-02-28 ENCOUNTER — TELEPHONE (OUTPATIENT)
Dept: ORTHOPEDIC SURGERY | Age: 32
End: 2022-02-28

## 2022-02-28 DIAGNOSIS — S92.355D CLOSED NONDISPLACED FRACTURE OF FIFTH METATARSAL BONE OF LEFT FOOT WITH ROUTINE HEALING, SUBSEQUENT ENCOUNTER: Primary | ICD-10-CM

## 2022-02-28 RX ORDER — HYDROCODONE BITARTRATE AND ACETAMINOPHEN 5; 325 MG/1; MG/1
1 TABLET ORAL EVERY 4 HOURS PRN
Qty: 30 TABLET | Refills: 0 | Status: SHIPPED | OUTPATIENT
Start: 2022-02-28 | End: 2022-03-07

## 2022-03-07 ENCOUNTER — TELEPHONE (OUTPATIENT)
Dept: ORTHOPEDIC SURGERY | Age: 32
End: 2022-03-07

## 2022-03-07 ENCOUNTER — OFFICE VISIT (OUTPATIENT)
Dept: FAMILY MEDICINE CLINIC | Age: 32
End: 2022-03-07
Payer: MEDICAID

## 2022-03-07 VITALS
WEIGHT: 277 LBS | BODY MASS INDEX: 32.71 KG/M2 | OXYGEN SATURATION: 98 % | DIASTOLIC BLOOD PRESSURE: 72 MMHG | HEART RATE: 65 BPM | HEIGHT: 77 IN | SYSTOLIC BLOOD PRESSURE: 130 MMHG

## 2022-03-07 DIAGNOSIS — R11.0 NAUSEA: ICD-10-CM

## 2022-03-07 DIAGNOSIS — I10 HYPERTENSION, UNSPECIFIED TYPE: Primary | ICD-10-CM

## 2022-03-07 DIAGNOSIS — S92.352G CLOSED DISPLACED FRACTURE OF FIFTH METATARSAL BONE OF LEFT FOOT WITH DELAYED HEALING, SUBSEQUENT ENCOUNTER: ICD-10-CM

## 2022-03-07 DIAGNOSIS — K50.019 CROHN'S DISEASE OF SMALL INTESTINE WITH COMPLICATION (HCC): ICD-10-CM

## 2022-03-07 DIAGNOSIS — S92.355D CLOSED NONDISPLACED FRACTURE OF FIFTH METATARSAL BONE OF LEFT FOOT WITH ROUTINE HEALING, SUBSEQUENT ENCOUNTER: Primary | ICD-10-CM

## 2022-03-07 PROCEDURE — G8427 DOCREV CUR MEDS BY ELIG CLIN: HCPCS | Performed by: FAMILY MEDICINE

## 2022-03-07 PROCEDURE — 99213 OFFICE O/P EST LOW 20 MIN: CPT

## 2022-03-07 PROCEDURE — G8484 FLU IMMUNIZE NO ADMIN: HCPCS | Performed by: FAMILY MEDICINE

## 2022-03-07 PROCEDURE — G8417 CALC BMI ABV UP PARAM F/U: HCPCS | Performed by: FAMILY MEDICINE

## 2022-03-07 PROCEDURE — 4004F PT TOBACCO SCREEN RCVD TLK: CPT | Performed by: FAMILY MEDICINE

## 2022-03-07 PROCEDURE — 99214 OFFICE O/P EST MOD 30 MIN: CPT | Performed by: FAMILY MEDICINE

## 2022-03-07 RX ORDER — PROMETHAZINE HYDROCHLORIDE 6.25 MG/5ML
SYRUP ORAL
Qty: 300 ML | Refills: 2 | Status: SHIPPED | OUTPATIENT
Start: 2022-03-07 | End: 2022-03-25 | Stop reason: SDUPTHER

## 2022-03-07 ASSESSMENT — PATIENT HEALTH QUESTIONNAIRE - PHQ9
2. FEELING DOWN, DEPRESSED OR HOPELESS: 0
1. LITTLE INTEREST OR PLEASURE IN DOING THINGS: 0
SUM OF ALL RESPONSES TO PHQ9 QUESTIONS 1 & 2: 0
SUM OF ALL RESPONSES TO PHQ QUESTIONS 1-9: 0

## 2022-03-07 NOTE — PROGRESS NOTES
HPI:  Patient comes in today for   Chief Complaint   Patient presents with    3 Month Follow-Up   Here to  f/u is s/p Percutaneous reduction of left 5 th metatarsal fracture about 5 weeks ago still has pain is on Norco.H/o HTN is on norvasc BP sometimes runs high at home. H/O crohn's with intermittent adominal pain, nausea and emesis ,no blood in stools. Phenergan liquid  helps with nausea and has been using it  Chronically. Is seeing  Gastroenterology  and has been recommended starting on Humira and is planning on colnoscopy after his foot is healed.     HISTORY:  Past Medical History:   Diagnosis Date    Crohn's colitis [facility])        Past Surgical History:   Procedure Laterality Date    COLON SURGERY      FOOT SURGERY Left 1/18/2022    ORIF Left 5th Metatarsal performed by Yani Ortiz MD at [facility]          Family History   Problem Relation Age of Onset    Hypertension Father     Crohn's Disease Maternal Aunt        Social History     Socioeconomic History    Marital status: Single     Spouse name: Not on file    Number of children: Not on file    Years of education: Not on file    Highest education level: Not on file   Occupational History    Not on file   Tobacco Use    Smoking status: Current Every Day Smoker     Packs/day: 0.50     Types: Cigarettes    Smokeless tobacco: Never Used   Vaping Use    Vaping Use: Never used   Substance and Sexual Activity    Alcohol use: Yes     Comment: socially    Drug use: Not Currently    Sexual activity: Not on file   Other Topics Concern    Not on file   Social History Narrative    Not on file     Social Determinants of Health     Financial Resource Strain: Low Risk     Difficulty of Paying Living Expenses: Not hard at all   Food Insecurity: No Food Insecurity    Worried About Running Out of Food in the Last Year: Never true    Melissa of Food in the Last Year: Never true   Transportation Needs: No Transportation Needs    Lack of Transportation (Medical): No    Lack of Transportation (Non-Medical): No   Physical Activity:     Days of Exercise per Week: Not on file    Minutes of Exercise per Session: Not on file   Stress:     Feeling of Stress : Not on file   Social Connections:     Frequency of Communication with Friends and Family: Not on file    Frequency of Social Gatherings with Friends and Family: Not on file    Attends Synagogue Services: Not on file    Active Member of [address] [place] [address] or Organizations: Not on file    Attends Club or Organization Meetings: Not on file    Marital Status: Not on file   Intimate Partner Violence:     Fear of Current or Ex-Partner: Not on file    Emotionally Abused: Not on file    Physically Abused: Not on file    Sexually Abused: Not on file   Housing Stability:     Unable to Pay for Housing in the Last Year: Not on file    Number of [place] in the Last Year: Not on file    Unstable Housing in the Last Year: Not on file       Current Outpatient Medications   Medication Sig Dispense Refill    HYDROcodone-acetaminophen (NORCO) 5-325 MG per tablet Take 1 tablet by mouth every 4 hours as needed for Pain for up to 7 days. Intended supply: 7 days. Take lowest dose possible to manage pain 30 tablet 0    amLODIPine (NORVASC) 2.5 MG tablet Take 1 tablet by mouth daily 90 tablet 0    promethazine (PHENERGAN) 6.25 MG/5ML syrup TAKE 20MLS BY MOUTH FOUR TIMES A DAY AS NEEDED FOR NAUSEA 300 mL 2     No current facility-administered medications for this visit.         No Known Allergies    REVIEW OF SYSTEMS:  General: No fevers, chills, change in weight  HEENT: No double vision, blurry vision, runny nose, sore throat, tinnitus  Cardio: No chest pain, palpitations, KIM, edema, PND  Pulmonary: No cough, hemoptysis, SOB  GI: Has nausea chronically ,no  vomiting, dysphagia, odynophagia,has  Diarrhea,no blood in stools  : No dysuria, hematuria, urgency, incontinence  Musculoskeletal: Left foot pain s/p fracture pinninf left 5th metatarsal.No other muscle or joint aches, no joint swelling  Neuro: No dizziness/lightheadedness, no seizures  Endocrine: No polyuria, polydipsia, polyphagia, no temperature intolerance  Skin: No lesions or itching  No problems with ADLs  Sleep: Good  Psychiatric: No depression or anxiety    PHYSICAL EXAM:  VS:  /72   Pulse 65   Ht 6' 5\" (1.956 m)   Wt 277 lb (125.6 kg)   SpO2 98%   BMI 32.85 kg/m²   General:  Alert and oriented, NAD  HEENT:  TMs, NINI, EOMI, Conjunctivae clear       Throat currently clear. NECK:  Supple without adenopathy or thyromegaly, no carotid bruits  LUNGS:  CTA all fields  HEART:  RRR without M, R, or G  ABDOMEN:  Mildly obese,soft ,mildly  Tender all over,  without palpable abnormalities  EXTREMITIES: Left foot in surgical shoes . No leg edema, no calf tenderness  NEURO:  No focal deficits. SKIN:  warm to touch,normal texture. No active lesions. ASSESSMENT/PLAN:     Diagnosis Orders   1. Hypertension, unspecified type     2. Nausea  promethazine (PHENERGAN) 6.25 MG/5ML syrup   3. Crohn's disease of small intestine with complication ([address]     4. Closed displaced fracture of fifth metatarsal bone of left foot with delayed healing, subsequent encounter         No orders of the defined types were placed in this encounter. Requested Prescriptions     Pending Prescriptions Disp Refills    promethazine (PHENERGAN) 6.25 MG/5ML syrup 300 mL 2     Sig: TAKE 20MLS BY MOUTH FOUR TIMES A DAY AS NEEDED FOR NAUSEA   Continue norvasc  Discussed side effects  Phenergan liquid prn for nausea advised to wean down as possible. F/u with ortho for fracture care  F/U with gastroenterology as scheduled . Return in about 3 months (around 6/7/2022).     Electronically signed by Mechelle Villagomez MD

## 2022-03-07 NOTE — TELEPHONE ENCOUNTER
Patient would like a refill of norco sent here to [facility].  Patient next appointment is 3/8/22

## 2022-03-08 ENCOUNTER — OFFICE VISIT (OUTPATIENT)
Dept: ORTHOPEDIC SURGERY | Age: 32
End: 2022-03-08
Payer: MEDICAID

## 2022-03-08 ENCOUNTER — HOSPITAL ENCOUNTER (OUTPATIENT)
Dept: GENERAL RADIOLOGY | Age: 32
Discharge: HOME | End: 2022-03-10
Payer: MEDICAID

## 2022-03-08 VITALS
SYSTOLIC BLOOD PRESSURE: 130 MMHG | BODY MASS INDEX: 32.71 KG/M2 | HEART RATE: 80 BPM | HEIGHT: 77 IN | WEIGHT: 277 LBS | DIASTOLIC BLOOD PRESSURE: 64 MMHG

## 2022-03-08 DIAGNOSIS — S92.355S CLOSED NONDISPLACED FRACTURE OF FIFTH METATARSAL BONE OF LEFT FOOT, SEQUELA: ICD-10-CM

## 2022-03-08 DIAGNOSIS — S92.355D CLOSED NONDISPLACED FRACTURE OF FIFTH METATARSAL BONE OF LEFT FOOT WITH ROUTINE HEALING, SUBSEQUENT ENCOUNTER: ICD-10-CM

## 2022-03-08 PROCEDURE — 99024 POSTOP FOLLOW-UP VISIT: CPT | Performed by: PHYSICIAN ASSISTANT

## 2022-03-08 PROCEDURE — 99212 OFFICE O/P EST SF 10 MIN: CPT | Performed by: ORTHOPAEDIC SURGERY

## 2022-03-08 PROCEDURE — 73630 X-RAY EXAM OF FOOT: CPT

## 2022-03-08 RX ORDER — TRAMADOL HYDROCHLORIDE 50 MG/1
50 TABLET ORAL EVERY 4 HOURS PRN
Qty: 30 TABLET | Refills: 0 | Status: SHIPPED | OUTPATIENT
Start: 2022-03-08 | End: 2022-03-15

## 2022-03-08 NOTE — PROGRESS NOTES
Orthopaedic Progress Note      CHIEF COMPLAINT:  Left fifth metatarsal fracture     HISTORY OF PRESENT ILLNESS:       Mr. Bianca Sandoval  is a 28 y.o. male  who presents with s/p perc pinning left 5th metatarsal. Pin site no drainage. Still with pain. Taking norco will work down to FLAQUITA Edwards       Past Medical History:    Past Medical History:   Diagnosis Date    Crohn's colitis [facility])        Past Surgical History:    Past Surgical History:   Procedure Laterality Date    COLON SURGERY      FOOT SURGERY Left 1/18/2022    ORIF Left 5th Metatarsal performed by Amber Shaver MD at [facility]           Current  Medications:  Current Outpatient Medications   Medication Sig Dispense Refill    traMADol (ULTRAM) 50 MG tablet Take 1 tablet by mouth every 4 hours as needed for Pain for up to 7 days. Intended supply: 5 days. Take lowest dose possible to manage pain 30 tablet 0    promethazine (PHENERGAN) 6.25 MG/5ML syrup TAKE 20MLS BY MOUTH FOUR TIMES A DAY AS NEEDED FOR NAUSEA 300 mL 2    amLODIPine (NORVASC) 2.5 MG tablet Take 1 tablet by mouth daily 90 tablet 0     No current facility-administered medications for this visit. Allergies:  Patient has no known allergies. Social History:   Social History     Tobacco Use   Smoking Status Current Every Day Smoker    Packs/day: 0.50    Types: Cigarettes   Smokeless Tobacco Never Used     Social History     Substance and Sexual Activity   Alcohol Use Yes    Comment: socially     Social History     Substance and Sexual Activity   Drug Use Not Currently       Family History:  Family History   Problem Relation Age of Onset    Hypertension Father     Crohn's Disease Maternal Aunt        REVIEW OF SYSTEMS:  Constitutional: Denies any fever, chills. Musculoskeletal: Positive for 5th metatarsal fracture . PHYSICAL EXAM:  [unfilled]  General appearance:  Alert and oriented x 3. No apparent distress, appears stated age, calm and cooperative. Musculoskeletal:  Pin site no drainage. Pain lateral aspect . DATA:  CBC:   Lab Results   Component Value Date    WBC 10.8 10/24/2021    HGB 13.7 10/24/2021     10/24/2021     BMP:    Lab Results   Component Value Date     10/24/2021    K 3.7 10/24/2021    CL 97 10/24/2021    CO2 28 10/24/2021    BUN 6 10/24/2021    CREATININE 0.85 10/24/2021    CALCIUM 9.7 10/24/2021    GLUCOSE 107 10/24/2021     PT/INR:    Lab Results   Component Value Date    PROTIME 12.8 10/24/2021    INR 1.0 10/24/2021     Troponin:  No results found for: TROPONINI  No results for input(s): LIPASE, AMYLASE in the last 72 hours. No results for input(s): AST, ALT, BILIDIR, BILITOT, ALKPHOS in the last 72 hours. Uric Acid:  No components found for: URIC  Urine Culture:  No components found for: CURINE    Radiology:   XR FOOT LEFT (MIN 3 VIEWS)    Result Date: 2/22/2022  EXAMINATION: THREE XRAY VIEWS OF THE LEFT FOOT 2/22/2022 9:49 am COMPARISON: February 8, 2022 HISTORY: ORDERING SYSTEM PROVIDED HISTORY: Closed nondisplaced fracture of fifth metatarsal bone of left foot with routine healing, subsequent encounter TECHNOLOGIST PROVIDED HISTORY: Reason for Exam: Follow up for left 5th metatarsal fracture FINDINGS: Percutaneous pin stabilizing 5th metatarsal fracture unchanged in alignment or position with stable alignment of oblique 5th metatarsal fracture. No interval change overall. Soft tissues unremarkable. Stable exam     XR FOOT LEFT (MIN 3 VIEWS)    Result Date: 2/8/2022  EXAMINATION: THREE XRAY VIEWS OF THE LEFT FOOT 2/8/2022 8:47 am COMPARISON: January 18, 2022 HISTORY: ORDERING SYSTEM PROVIDED HISTORY: Closed nondisplaced fracture of fifth metatarsal bone of left foot, initial encounter TECHNOLOGIST PROVIDED HISTORY: Reason for Exam: F/u orif FINDINGS: Interval pin stabilization 5th metatarsal fracture. No change in overall alignment or displacement of fracture. No acute interval change from previous.   Soft tissues unremarkable. S/p pin stabilization 5th metatarsal fracture which appears overall stable. Diagnosis Orders   1. Closed nondisplaced fracture of fifth metatarsal bone of left foot, sequela  traMADol (ULTRAM) 50 MG tablet         PLAN:  Pin removed today dry dressing applied  Follow up 1 month  WBAT in post op shoe     No orders of the defined types were placed in this encounter.        Procedure:        Electronically signed by Reina Adkins PA-C on 3/8/2022 at 10:57 AM

## 2022-03-21 ENCOUNTER — TELEPHONE (OUTPATIENT)
Dept: ORTHOPEDIC SURGERY | Age: 32
End: 2022-03-21

## 2022-03-21 DIAGNOSIS — S92.355S CLOSED NONDISPLACED FRACTURE OF FIFTH METATARSAL BONE OF LEFT FOOT, SEQUELA: Primary | ICD-10-CM

## 2022-03-22 RX ORDER — TRAMADOL HYDROCHLORIDE 50 MG/1
50 TABLET ORAL EVERY 4 HOURS PRN
Qty: 30 TABLET | Refills: 0 | Status: SHIPPED | OUTPATIENT
Start: 2022-03-22 | End: 2022-03-27

## 2022-03-23 DIAGNOSIS — S92.355S CLOSED NONDISPLACED FRACTURE OF FIFTH METATARSAL BONE OF LEFT FOOT, SEQUELA: Primary | ICD-10-CM

## 2022-03-25 DIAGNOSIS — R11.0 NAUSEA: ICD-10-CM

## 2022-04-03 RX ORDER — PROMETHAZINE HYDROCHLORIDE 6.25 MG/5ML
SYRUP ORAL
Qty: 300 ML | Refills: 2 | Status: SHIPPED | OUTPATIENT
Start: 2022-04-03 | End: 2022-05-27

## 2022-04-05 ENCOUNTER — HOSPITAL ENCOUNTER (OUTPATIENT)
Dept: GENERAL RADIOLOGY | Age: 32
Discharge: HOME | End: 2022-04-07
Payer: MEDICAID

## 2022-04-05 ENCOUNTER — OFFICE VISIT (OUTPATIENT)
Dept: ORTHOPEDIC SURGERY | Age: 32
End: 2022-04-05
Payer: MEDICAID

## 2022-04-05 VITALS
WEIGHT: 277 LBS | HEIGHT: 77 IN | SYSTOLIC BLOOD PRESSURE: 112 MMHG | BODY MASS INDEX: 32.71 KG/M2 | HEART RATE: 63 BPM | DIASTOLIC BLOOD PRESSURE: 62 MMHG

## 2022-04-05 DIAGNOSIS — S92.355D CLOSED NONDISPLACED FRACTURE OF FIFTH METATARSAL BONE OF LEFT FOOT WITH ROUTINE HEALING, SUBSEQUENT ENCOUNTER: Primary | ICD-10-CM

## 2022-04-05 DIAGNOSIS — S92.355S CLOSED NONDISPLACED FRACTURE OF FIFTH METATARSAL BONE OF LEFT FOOT, SEQUELA: ICD-10-CM

## 2022-04-05 PROCEDURE — 99024 POSTOP FOLLOW-UP VISIT: CPT | Performed by: PHYSICIAN ASSISTANT

## 2022-04-05 PROCEDURE — 99212 OFFICE O/P EST SF 10 MIN: CPT

## 2022-04-05 PROCEDURE — 73630 X-RAY EXAM OF FOOT: CPT

## 2022-04-05 RX ORDER — TRAMADOL HYDROCHLORIDE 50 MG/1
50 TABLET ORAL EVERY 12 HOURS PRN
Qty: 42 TABLET | Refills: 0 | Status: SHIPPED | OUTPATIENT
Start: 2022-04-05 | End: 2022-04-26

## 2022-04-13 DIAGNOSIS — S92.355D CLOSED NONDISPLACED FRACTURE OF FIFTH METATARSAL BONE OF LEFT FOOT WITH ROUTINE HEALING, SUBSEQUENT ENCOUNTER: Primary | ICD-10-CM

## 2022-05-27 DIAGNOSIS — R11.0 NAUSEA: ICD-10-CM

## 2022-05-27 NOTE — TELEPHONE ENCOUNTER
Raudel Donovan called requesting a refill of the below medication which has been pended for you:     Requested Prescriptions     Pending Prescriptions Disp Refills    Promethazine HCl 6.25 MG/5ML SOLN [Pharmacy Med Name: PROMETHAZINE 6.25 MG/5 ML SOLN] 300 mL 2     Sig: take 20 milliliters by mouth four times a day if needed for nausea       Last Appointment Date: 3/7/2022  Next Appointment Date: Visit date not found    No Known Allergies

## 2023-08-05 DIAGNOSIS — I10 HYPERTENSION, UNSPECIFIED TYPE: ICD-10-CM

## 2023-08-05 DIAGNOSIS — R11.0 NAUSEA: ICD-10-CM

## 2023-08-05 RX ORDER — AMLODIPINE BESYLATE 2.5 MG/1
2.5 TABLET ORAL DAILY
Qty: 90 TABLET | Refills: 0 | Status: CANCELLED | OUTPATIENT
Start: 2023-08-05

## 2023-08-07 DIAGNOSIS — I10 HYPERTENSION, UNSPECIFIED TYPE: ICD-10-CM

## 2023-08-07 DIAGNOSIS — R11.0 NAUSEA: ICD-10-CM

## 2023-08-07 NOTE — TELEPHONE ENCOUNTER
Got a rx request for pt. Pt does need to be seen by Dr. Theodore Márquez before getting his medications. Trying call all the numbers on file and none of them worked.

## 2023-08-07 NOTE — TELEPHONE ENCOUNTER
Patient to call back to set up new to provider appt. Wants to stay in [facility] any provider.

## 2023-08-10 RX ORDER — AMLODIPINE BESYLATE 2.5 MG/1
TABLET ORAL
Qty: 90 TABLET | Refills: 0 | OUTPATIENT
Start: 2023-08-10

## 2023-08-10 RX ORDER — PROMETHAZINE HYDROCHLORIDE 6.25 MG/5ML
SYRUP ORAL
Qty: 300 ML | Refills: 2 | OUTPATIENT
Start: 2023-08-10

## 2024-11-24 ENCOUNTER — HOSPITAL ENCOUNTER (EMERGENCY)
Age: 34
Discharge: HOME | End: 2024-11-24
Attending: EMERGENCY MEDICINE
Payer: MEDICAID

## 2024-11-24 ENCOUNTER — APPOINTMENT (OUTPATIENT)
Dept: GENERAL RADIOLOGY | Age: 34
End: 2024-11-24
Payer: MEDICAID

## 2024-11-24 VITALS
HEART RATE: 92 BPM | HEIGHT: 75 IN | RESPIRATION RATE: 18 BRPM | DIASTOLIC BLOOD PRESSURE: 84 MMHG | TEMPERATURE: 98.2 F | WEIGHT: 230 LBS | SYSTOLIC BLOOD PRESSURE: 121 MMHG | OXYGEN SATURATION: 96 % | BODY MASS INDEX: 28.6 KG/M2

## 2024-11-24 DIAGNOSIS — J06.9 ACUTE UPPER RESPIRATORY INFECTION: Primary | ICD-10-CM

## 2024-11-24 LAB
SARS-COV-2 RDRP RESP QL NAA+PROBE: NOT DETECTED
SPECIMEN DESCRIPTION: NORMAL
SPECIMEN SOURCE: NORMAL
STREP A, MOLECULAR: NEGATIVE

## 2024-11-24 PROCEDURE — 87651 STREP A DNA AMP PROBE: CPT

## 2024-11-24 PROCEDURE — 71046 X-RAY EXAM CHEST 2 VIEWS: CPT

## 2024-11-24 PROCEDURE — 99284 EMERGENCY DEPT VISIT MOD MDM: CPT

## 2024-11-24 PROCEDURE — 87635 SARS-COV-2 COVID-19 AMP PRB: CPT

## 2024-11-24 RX ORDER — DEXTROMETHORPHAN HYDROBROMIDE AND PROMETHAZINE HYDROCHLORIDE 15; 6.25 MG/5ML; MG/5ML
SYRUP ORAL
Qty: 300 ML | Refills: 1 | Status: SHIPPED | OUTPATIENT
Start: 2024-11-24

## 2024-11-24 ASSESSMENT — PAIN DESCRIPTION - LOCATION: LOCATION: THROAT

## 2024-11-24 ASSESSMENT — PAIN SCALES - GENERAL: PAINLEVEL_OUTOF10: 7

## 2024-11-24 ASSESSMENT — PAIN - FUNCTIONAL ASSESSMENT: PAIN_FUNCTIONAL_ASSESSMENT: 0-10

## 2024-11-24 ASSESSMENT — PAIN DESCRIPTION - DESCRIPTORS: DESCRIPTORS: BURNING;ACHING

## 2024-11-24 NOTE — ED PROVIDER NOTES
[facility] ED  EMERGENCY DEPARTMENT ENCOUNTER      Pt Name: Bryant Mattson  MRN: [id number]  Birthdate 1990  Date of evaluation: 11/24/2024  Provider: Kristian Patel MD    CHIEF COMPLAINT     Chief Complaint   Patient presents with    Cough    Pharyngitis    Nasal Congestion         HISTORY OF PRESENT ILLNESS   (Location/Symptom, Timing/Onset, Context/Setting,Quality, Duration, Modifying Factors, Severity)  Note limiting factors.   Bryant Mattson is a 34 y.o. male who presents to the emergency department with a 5-day history of nasal congestion and cough with sore throat.  Patient does not report fever or night sweats.  He has a history of Crohn's disease.  He also has a history of hypertension and takes amlodipine.  He will sometimes take Phenergan for his abdominal discomfort.    The history is provided by the patient.       Nursing Notes werereviewed.    REVIEW OF SYSTEMS    (2-9 systems for level 4, 10 or more for level 5)     Review of Systems   All other systems reviewed and are negative.      Except as noted above the remainder of the review of systems was reviewed and negative.       PAST MEDICAL HISTORY     Past Medical History:   Diagnosis Date    Crohn's colitis (HCC)          SURGICALHISTORY       Past Surgical History:   Procedure Laterality Date    COLON SURGERY      FOOT SURGERY Left 1/18/2022    ORIF Left 5th Metatarsal performed by Brendan Sanders MD at [facility] OR    HERNIA REPAIR           CURRENT MEDICATIONS       Previous Medications    AMLODIPINE (NORVASC) 2.5 MG TABLET    Take 1 tablet by mouth daily    PROMETHAZINE HCL 6.25 MG/5ML SOLN    take 20 milliliters by mouth four times a day if needed for nausea       ALLERGIES     Tessalon [benzonatate]    FAMILY HISTORY       Family History   Problem Relation Age of Onset    Hypertension Father     Crohn's Disease Maternal Aunt           SOCIAL HISTORY       Social History     Socioeconomic History    Marital status: Single     Spouse

## 2024-11-27 ENCOUNTER — OFFICE VISIT (OUTPATIENT)
Dept: FAMILY MEDICINE CLINIC | Age: 34
End: 2024-11-27
Payer: MEDICAID

## 2024-11-27 VITALS
OXYGEN SATURATION: 99 % | WEIGHT: 266 LBS | HEART RATE: 67 BPM | DIASTOLIC BLOOD PRESSURE: 82 MMHG | SYSTOLIC BLOOD PRESSURE: 124 MMHG | BODY MASS INDEX: 33.07 KG/M2 | HEIGHT: 75 IN

## 2024-11-27 DIAGNOSIS — Z00.00 ANNUAL PHYSICAL EXAM: ICD-10-CM

## 2024-11-27 DIAGNOSIS — R11.0 CHRONIC NAUSEA: ICD-10-CM

## 2024-11-27 DIAGNOSIS — I10 HYPERTENSION, UNSPECIFIED TYPE: Primary | ICD-10-CM

## 2024-11-27 DIAGNOSIS — K50.10 CROHN'S DISEASE OF COLON WITHOUT COMPLICATION (HCC): ICD-10-CM

## 2024-11-27 DIAGNOSIS — Z13.1 DIABETES MELLITUS SCREENING: ICD-10-CM

## 2024-11-27 PROCEDURE — 99211 OFF/OP EST MAY X REQ PHY/QHP: CPT | Performed by: FAMILY MEDICINE

## 2024-11-27 RX ORDER — AMLODIPINE BESYLATE 10 MG/1
10 TABLET ORAL DAILY
Qty: 90 TABLET | Refills: 1 | Status: SHIPPED | OUTPATIENT
Start: 2024-11-27

## 2024-11-27 RX ORDER — AMLODIPINE BESYLATE 2.5 MG/1
2.5 TABLET ORAL DAILY
Qty: 90 TABLET | Refills: 0 | Status: SHIPPED | OUTPATIENT
Start: 2024-11-27 | End: 2024-11-27

## 2024-11-27 SDOH — ECONOMIC STABILITY: FOOD INSECURITY: WITHIN THE PAST 12 MONTHS, THE FOOD YOU BOUGHT JUST DIDN'T LAST AND YOU DIDN'T HAVE MONEY TO GET MORE.: NEVER TRUE

## 2024-11-27 SDOH — ECONOMIC STABILITY: INCOME INSECURITY: HOW HARD IS IT FOR YOU TO PAY FOR THE VERY BASICS LIKE FOOD, HOUSING, MEDICAL CARE, AND HEATING?: NOT HARD AT ALL

## 2024-11-27 SDOH — ECONOMIC STABILITY: FOOD INSECURITY: WITHIN THE PAST 12 MONTHS, YOU WORRIED THAT YOUR FOOD WOULD RUN OUT BEFORE YOU GOT MONEY TO BUY MORE.: NEVER TRUE

## 2024-11-27 ASSESSMENT — PATIENT HEALTH QUESTIONNAIRE - PHQ9
SUM OF ALL RESPONSES TO PHQ9 QUESTIONS 1 & 2: 0
2. FEELING DOWN, DEPRESSED OR HOPELESS: NOT AT ALL
SUM OF ALL RESPONSES TO PHQ QUESTIONS 1-9: 0
1. LITTLE INTEREST OR PLEASURE IN DOING THINGS: NOT AT ALL
SUM OF ALL RESPONSES TO PHQ QUESTIONS 1-9: 0

## 2024-11-27 NOTE — PROGRESS NOTES
Transportation Needs: No Transportation Needs (12/6/2021)    PRAPARE - Transportation     Lack of Transportation (Medical): No     Lack of Transportation (Non-Medical): No   Physical Activity: Not on file   Stress: Not on file   Social Connections: Not on file   Intimate Partner Violence: Not on file   Housing Stability: Not on file       Current Outpatient Medications   Medication Sig Dispense Refill    promethazine-dextromethorphan (PROMETHAZINE-DM) 6.25-15 MG/5ML syrup 1-2 tsp TID prn cough; will cause drowsiness. 300 mL 1    Promethazine HCl 6.25 MG/5ML SOLN take 20 milliliters by mouth four times a day if needed for nausea (Patient not taking: Reported on 11/24/2024) 300 mL 2    amLODIPine (NORVASC) 2.5 MG tablet Take 1 tablet by mouth daily 90 tablet 0     No current facility-administered medications for this visit.        Allergies   Allergen Reactions    Tessalon [Benzonatate] Hives       REVIEW OF SYSTEMS:  General: No fevers, chills, has lost some weight  HEENT: No double vision, blurry vision, runny nose, sore throat, tinnitus  Cardio: No chest pain, palpitations, KIM, edema, PND  Pulmonary: No cough, hemoptysis, SOB  GI: Has nausea chronically ,no  vomiting, dysphagia, odynophagia,has  Diarrhea,no blood in stools  : No dysuria, hematuria, urgency, incontinence  Musculoskeletal: Left foot pain since surgery ,also some pain in right fore foot resolved.No other muscle or joint aches, no joint swelling  Neuro: No dizziness/lightheadedness, no seizures  Endocrine: No polyuria, polydipsia, polyphagia, no temperature intolerance  Skin: No lesions or itching  No problems with ADLs  Sleep: Good  Psychiatric: No depression or anxiety    PHYSICAL EXAM:  VS:  Wt 120.7 kg (266 lb)   BMI 33.25 kg/m²   General:  Alert and oriented, NAD  HEENT:  TMs, NINI, EOMI, Conjunctivae clear.Throat currently clear.  NECK:  Supple without adenopathy or thyromegaly, no carotid bruits  LUNGS:  CTA all fields  HEART:  RRR without

## 2024-12-23 ENCOUNTER — HOSPITAL ENCOUNTER (EMERGENCY)
Age: 34
Discharge: HOME | End: 2024-12-23
Attending: EMERGENCY MEDICINE
Payer: MEDICAID

## 2024-12-23 VITALS
HEART RATE: 67 BPM | TEMPERATURE: 97.7 F | SYSTOLIC BLOOD PRESSURE: 165 MMHG | RESPIRATION RATE: 16 BRPM | WEIGHT: 240 LBS | HEIGHT: 75 IN | BODY MASS INDEX: 29.84 KG/M2 | DIASTOLIC BLOOD PRESSURE: 107 MMHG | OXYGEN SATURATION: 99 %

## 2024-12-23 DIAGNOSIS — K08.89 PAIN, DENTAL: Primary | ICD-10-CM

## 2024-12-23 PROCEDURE — 6370000000 HC RX 637 (ALT 250 FOR IP): Performed by: EMERGENCY MEDICINE

## 2024-12-23 PROCEDURE — 64400 NJX AA&/STRD TRIGEMINAL NRV: CPT

## 2024-12-23 PROCEDURE — 99283 EMERGENCY DEPT VISIT LOW MDM: CPT

## 2024-12-23 RX ORDER — HYDROCODONE BITARTRATE AND ACETAMINOPHEN 5; 325 MG/1; MG/1
1 TABLET ORAL EVERY 6 HOURS PRN
Qty: 12 TABLET | Refills: 0 | Status: SHIPPED | OUTPATIENT
Start: 2024-12-23 | End: 2024-12-26

## 2024-12-23 RX ORDER — AMOXICILLIN 875 MG/1
875 TABLET, COATED ORAL 2 TIMES DAILY
Qty: 20 TABLET | Refills: 0 | Status: SHIPPED | OUTPATIENT
Start: 2024-12-23 | End: 2025-01-02

## 2024-12-23 RX ORDER — AMOXICILLIN 250 MG/1
500 CAPSULE ORAL ONCE
Status: COMPLETED | OUTPATIENT
Start: 2024-12-23 | End: 2024-12-23

## 2024-12-23 RX ADMIN — AMOXICILLIN 500 MG: 250 CAPSULE ORAL at 01:10

## 2024-12-23 ASSESSMENT — ENCOUNTER SYMPTOMS
FACIAL SWELLING: 0
TROUBLE SWALLOWING: 0
SORE THROAT: 0
VOICE CHANGE: 0

## 2024-12-23 ASSESSMENT — PAIN DESCRIPTION - LOCATION: LOCATION: TEETH

## 2024-12-23 ASSESSMENT — PAIN - FUNCTIONAL ASSESSMENT
PAIN_FUNCTIONAL_ASSESSMENT: 0-10
PAIN_FUNCTIONAL_ASSESSMENT: ACTIVITIES ARE NOT PREVENTED

## 2024-12-23 ASSESSMENT — PAIN SCALES - GENERAL: PAINLEVEL_OUTOF10: 9

## 2024-12-23 ASSESSMENT — PAIN DESCRIPTION - DESCRIPTORS: DESCRIPTORS: ACHING;THROBBING

## 2024-12-23 ASSESSMENT — PAIN DESCRIPTION - ORIENTATION: ORIENTATION: LEFT

## 2024-12-23 ASSESSMENT — LIFESTYLE VARIABLES
HOW MANY STANDARD DRINKS CONTAINING ALCOHOL DO YOU HAVE ON A TYPICAL DAY: PATIENT DOES NOT DRINK
HOW OFTEN DO YOU HAVE A DRINK CONTAINING ALCOHOL: NEVER

## 2024-12-23 ASSESSMENT — PAIN DESCRIPTION - PAIN TYPE: TYPE: ACUTE PAIN

## 2024-12-23 NOTE — ED PROVIDER NOTES
pain Max Daily Amount: 4 tablets     Dispense:  12 tablet     Refill:  0              Labs Reviewed - No data to display     No orders to display        FINAL IMPRESSION      1. Pain, dental          DISPOSITION/PLAN   DISPOSITION Decision To Discharge 12/23/2024 01:02:22 AM   DISPOSITION CONDITION Stable           Condition on Disposition  Good    PATIENT REFERRED TO:  No follow-up provider specified.    DISCHARGE MEDICATIONS:  New Prescriptions    AMOXICILLIN (AMOXIL) 875 MG TABLET    Take 1 tablet by mouth 2 times daily for 10 days    HYDROCODONE-ACETAMINOPHEN (NORCO) 5-325 MG PER TABLET    Take 1 tablet by mouth every 6 hours as needed for Pain for up to 3 days. Intended supply: 3 days. Take lowest dose possible to manage pain Max Daily Amount: 4 tablets       (Please note that portions of this note werecompleted with a voice recognition program.  Efforts were made to edit the dictations but occasionally words are mis-transcribed.)    Carlos Adler MD MD  Emergency Physician Attending         Carlos Adler MD  12/23/24 [id number]

## 2024-12-23 NOTE — DISCHARGE INSTRUCTIONS
Take him back as directed.  Use pain medicine carefully and not if drinking driving operate machinery.  This is a narcotic and should be used carefully and discontinued once better.  Use stool softener to avoid constipation.  Prefer use Motrin and Tylenol for mild pain.  Call dentist right away for reevaluation.  The numbing of your mouth will make it difficult for you to notice very hot or very cold sensations.  Be careful with these temperature food or drinks until it wears off.

## 2025-03-11 ENCOUNTER — APPOINTMENT (OUTPATIENT)
Dept: CT IMAGING | Age: 35
End: 2025-03-11
Payer: MEDICAID

## 2025-03-11 ENCOUNTER — HOSPITAL ENCOUNTER (EMERGENCY)
Age: 35
Discharge: HOME | End: 2025-03-11
Attending: EMERGENCY MEDICINE
Payer: MEDICAID

## 2025-03-11 VITALS
WEIGHT: 240 LBS | DIASTOLIC BLOOD PRESSURE: 79 MMHG | TEMPERATURE: 97.9 F | OXYGEN SATURATION: 99 % | SYSTOLIC BLOOD PRESSURE: 125 MMHG | HEART RATE: 66 BPM | RESPIRATION RATE: 15 BRPM | HEIGHT: 76 IN | BODY MASS INDEX: 29.22 KG/M2

## 2025-03-11 DIAGNOSIS — R10.84 GENERALIZED ABDOMINAL PAIN: Primary | ICD-10-CM

## 2025-03-11 DIAGNOSIS — Z87.19 HISTORY OF CROHN'S DISEASE: ICD-10-CM

## 2025-03-11 LAB
ALBUMIN SERPL-MCNC: 4.3 G/DL (ref 3.5–5.2)
ALBUMIN/GLOB SERPL: 1.4 {RATIO} (ref 1–2.5)
ALP SERPL-CCNC: 78 U/L (ref 40–129)
ALT SERPL-CCNC: 21 U/L (ref 5–41)
ANION GAP SERPL CALCULATED.3IONS-SCNC: 10 MMOL/L (ref 9–17)
AST SERPL-CCNC: 20 U/L
BASOPHILS # BLD: <0.03 K/UL (ref 0–0.2)
BASOPHILS NFR BLD: 0 % (ref 0–2)
BILIRUB DIRECT SERPL-MCNC: 0.2 MG/DL
BILIRUB INDIRECT SERPL-MCNC: 0.6 MG/DL (ref 0–1)
BILIRUB SERPL-MCNC: 0.8 MG/DL (ref 0.3–1.2)
BUN SERPL-MCNC: 10 MG/DL (ref 6–20)
CALCIUM SERPL-MCNC: 9.5 MG/DL (ref 8.6–10.4)
CHLORIDE SERPL-SCNC: 105 MMOL/L (ref 98–107)
CO2 SERPL-SCNC: 25 MMOL/L (ref 20–31)
CREAT SERPL-MCNC: 1.1 MG/DL (ref 0.7–1.2)
EOSINOPHIL # BLD: 0.14 K/UL (ref 0–0.44)
EOSINOPHILS RELATIVE PERCENT: 3 % (ref 1–4)
ERYTHROCYTE [DISTWIDTH] IN BLOOD BY AUTOMATED COUNT: 13.5 % (ref 11.8–14.4)
GFR, ESTIMATED: >90 ML/MIN/1.73M2
GLUCOSE SERPL-MCNC: 98 MG/DL (ref 70–99)
HCT VFR BLD AUTO: 41.9 % (ref 40.7–50.3)
HGB BLD-MCNC: 14.4 G/DL (ref 13–17)
IMM GRANULOCYTES # BLD AUTO: <0.03 K/UL (ref 0–0.3)
IMM GRANULOCYTES NFR BLD: 0 %
LACTATE BLDV-SCNC: 1.3 MMOL/L (ref 0.5–2.2)
LIPASE SERPL-CCNC: 22 U/L (ref 13–60)
LYMPHOCYTES NFR BLD: 1.25 K/UL (ref 1.1–3.7)
LYMPHOCYTES RELATIVE PERCENT: 23 % (ref 24–43)
MCH RBC QN AUTO: 27.9 PG (ref 25.2–33.5)
MCHC RBC AUTO-ENTMCNC: 34.4 G/DL (ref 25.2–33.5)
MCV RBC AUTO: 81 FL (ref 82.6–102.9)
MONOCYTES NFR BLD: 0.5 K/UL (ref 0.1–1.2)
MONOCYTES NFR BLD: 9 % (ref 3–12)
NEUTROPHILS NFR BLD: 65 % (ref 36–65)
NEUTS SEG NFR BLD: 3.48 K/UL (ref 1.5–8.1)
NRBC BLD-RTO: 0 PER 100 WBC
PLATELET # BLD AUTO: 269 K/UL (ref 138–453)
PMV BLD AUTO: 10.5 FL (ref 8.1–13.5)
POTASSIUM SERPL-SCNC: 4.4 MMOL/L (ref 3.7–5.3)
PROT SERPL-MCNC: 7.4 G/DL (ref 6.4–8.3)
RBC # BLD AUTO: 5.17 M/UL (ref 4.21–5.77)
RBC # BLD: ABNORMAL 10*6/UL
SODIUM SERPL-SCNC: 140 MMOL/L (ref 135–144)
WBC OTHER # BLD: 5.4 K/UL (ref 3.5–11.3)

## 2025-03-11 PROCEDURE — 6360000002 HC RX W HCPCS: Performed by: EMERGENCY MEDICINE

## 2025-03-11 PROCEDURE — 6360000004 HC RX CONTRAST MEDICATION: Performed by: EMERGENCY MEDICINE

## 2025-03-11 PROCEDURE — 96361 HYDRATE IV INFUSION ADD-ON: CPT

## 2025-03-11 PROCEDURE — 82248 BILIRUBIN DIRECT: CPT

## 2025-03-11 PROCEDURE — 99285 EMERGENCY DEPT VISIT HI MDM: CPT

## 2025-03-11 PROCEDURE — 80053 COMPREHEN METABOLIC PANEL: CPT

## 2025-03-11 PROCEDURE — 96374 THER/PROPH/DIAG INJ IV PUSH: CPT

## 2025-03-11 PROCEDURE — 83605 ASSAY OF LACTIC ACID: CPT

## 2025-03-11 PROCEDURE — 83690 ASSAY OF LIPASE: CPT

## 2025-03-11 PROCEDURE — 2709999900 CT ABDOMEN PELVIS W IV CONTRAST

## 2025-03-11 PROCEDURE — 2580000003 HC RX 258: Performed by: EMERGENCY MEDICINE

## 2025-03-11 PROCEDURE — 85025 COMPLETE CBC W/AUTO DIFF WBC: CPT

## 2025-03-11 RX ORDER — 0.9 % SODIUM CHLORIDE 0.9 %
1000 INTRAVENOUS SOLUTION INTRAVENOUS ONCE
Status: COMPLETED | OUTPATIENT
Start: 2025-03-11 | End: 2025-03-11

## 2025-03-11 RX ORDER — IOPAMIDOL 755 MG/ML
100 INJECTION, SOLUTION INTRAVASCULAR
Status: COMPLETED | OUTPATIENT
Start: 2025-03-11 | End: 2025-03-11

## 2025-03-11 RX ORDER — KETOROLAC TROMETHAMINE 30 MG/ML
30 INJECTION, SOLUTION INTRAMUSCULAR; INTRAVENOUS ONCE
Status: COMPLETED | OUTPATIENT
Start: 2025-03-11 | End: 2025-03-11

## 2025-03-11 RX ORDER — PREDNISONE 10 MG/1
TABLET ORAL
Qty: 20 TABLET | Refills: 0 | Status: SHIPPED | OUTPATIENT
Start: 2025-03-11 | End: 2025-03-21

## 2025-03-11 RX ADMIN — IOPAMIDOL 100 ML: 755 INJECTION, SOLUTION INTRAVENOUS at 10:44

## 2025-03-11 RX ADMIN — SODIUM CHLORIDE 1000 ML: 0.9 INJECTION, SOLUTION INTRAVENOUS at 10:30

## 2025-03-11 RX ADMIN — KETOROLAC TROMETHAMINE 30 MG: 30 INJECTION, SOLUTION INTRAMUSCULAR at 11:02

## 2025-03-11 ASSESSMENT — PAIN DESCRIPTION - ORIENTATION
ORIENTATION: LOWER
ORIENTATION: LOWER

## 2025-03-11 ASSESSMENT — ENCOUNTER SYMPTOMS
SHORTNESS OF BREATH: 0
CONSTIPATION: 0
BLOOD IN STOOL: 0
SORE THROAT: 0
WHEEZING: 0
ABDOMINAL PAIN: 1
NAUSEA: 1
DIARRHEA: 0
BACK PAIN: 0
VOMITING: 1
TROUBLE SWALLOWING: 0

## 2025-03-11 ASSESSMENT — PAIN DESCRIPTION - FREQUENCY
FREQUENCY: CONTINUOUS
FREQUENCY: CONTINUOUS

## 2025-03-11 ASSESSMENT — PAIN - FUNCTIONAL ASSESSMENT
PAIN_FUNCTIONAL_ASSESSMENT: ACTIVITIES ARE NOT PREVENTED
PAIN_FUNCTIONAL_ASSESSMENT: ACTIVITIES ARE NOT PREVENTED
PAIN_FUNCTIONAL_ASSESSMENT: 0-10
PAIN_FUNCTIONAL_ASSESSMENT: ACTIVITIES ARE NOT PREVENTED
PAIN_FUNCTIONAL_ASSESSMENT: 0-10

## 2025-03-11 ASSESSMENT — PAIN DESCRIPTION - DESCRIPTORS
DESCRIPTORS: ACHING
DESCRIPTORS: DISCOMFORT
DESCRIPTORS: DISCOMFORT

## 2025-03-11 ASSESSMENT — PAIN DESCRIPTION - ONSET
ONSET: ON-GOING
ONSET: ON-GOING

## 2025-03-11 ASSESSMENT — PAIN DESCRIPTION - LOCATION
LOCATION: ABDOMEN;BACK

## 2025-03-11 ASSESSMENT — PAIN SCALES - GENERAL
PAINLEVEL_OUTOF10: 8

## 2025-03-11 ASSESSMENT — PAIN DESCRIPTION - PAIN TYPE
TYPE: ACUTE PAIN
TYPE: ACUTE PAIN

## 2025-03-11 NOTE — ED PROVIDER NOTES
[facility]  eMERGENCY dEPARTMENT eNCOUnter      Pt Name: Bryant Mattson  MRN: [id number]  Birthdate 1990  Date of evaluation: 3/11/2025      CHIEF COMPLAINT       Chief Complaint   Patient presents with    Abdominal Pain    Back Pain    Fatigue     X 3 days.    Generalized Body Aches    Vomiting     After eating and worse at night/early morning.         HISTORY OF PRESENT ILLNESS    Bryant Mattson is a 34 y.o. male who presents with chief complaint of abdominal pain he said he is got some generalized bodyaches fatigue some episodes of vomiting but mostly nausea especially after eating he says it feels good to his Crohn's flaring up he has not a flareup in a while he has seen GI in the past but is not currently seeing any body he has had everything done is been on steroids has been on Remicade he is even had a partial bowel resection.  There is occasional blood in his stool which is normal for him is been no fevers chills cough or congestion      REVIEW OF SYSTEMS         Review of Systems   Constitutional:  Positive for fatigue. Negative for chills and fever.   HENT:  Negative for congestion, dental problem, sore throat and trouble swallowing.    Eyes:  Negative for visual disturbance.   Respiratory:  Negative for shortness of breath and wheezing.    Cardiovascular:  Negative for chest pain, palpitations and leg swelling.   Gastrointestinal:  Positive for abdominal pain, nausea and vomiting. Negative for blood in stool, constipation and diarrhea.   Genitourinary:  Negative for difficulty urinating, dysuria and testicular pain.   Musculoskeletal:  Positive for arthralgias. Negative for back pain, joint swelling and neck pain.   Skin:  Negative for rash.   Neurological:  Negative for dizziness, syncope, weakness and headaches.   Hematological:  Negative for adenopathy. Does not bruise/bleed easily.   Psychiatric/Behavioral:  Negative for confusion and suicidal ideas.          PAST MEDICAL HISTORY    has

## 2025-04-08 ENCOUNTER — OFFICE VISIT (OUTPATIENT)
Dept: PRIMARY CARE CLINIC | Age: 35
End: 2025-04-08
Payer: COMMERCIAL

## 2025-04-08 VITALS
DIASTOLIC BLOOD PRESSURE: 100 MMHG | BODY MASS INDEX: 31.66 KG/M2 | RESPIRATION RATE: 20 BRPM | SYSTOLIC BLOOD PRESSURE: 160 MMHG | HEART RATE: 66 BPM | WEIGHT: 260 LBS | HEIGHT: 76 IN | OXYGEN SATURATION: 97 %

## 2025-04-08 DIAGNOSIS — S61.412A LACERATION OF LEFT HAND WITHOUT FOREIGN BODY, INITIAL ENCOUNTER: Primary | ICD-10-CM

## 2025-04-08 PROCEDURE — 99212 OFFICE O/P EST SF 10 MIN: CPT | Performed by: PHYSICIAN ASSISTANT

## 2025-04-08 RX ORDER — LIDOCAINE HYDROCHLORIDE 10 MG/ML
5 INJECTION, SOLUTION EPIDURAL; INFILTRATION; INTRACAUDAL; PERINEURAL ONCE
Status: COMPLETED | OUTPATIENT
Start: 2025-04-08 | End: 2025-04-08

## 2025-04-08 RX ADMIN — LIDOCAINE HYDROCHLORIDE 5 ML: 10 INJECTION, SOLUTION EPIDURAL; INFILTRATION; INTRACAUDAL; PERINEURAL at 17:11

## 2025-04-08 ASSESSMENT — PATIENT HEALTH QUESTIONNAIRE - PHQ9
SUM OF ALL RESPONSES TO PHQ QUESTIONS 1-9: 0
1. LITTLE INTEREST OR PLEASURE IN DOING THINGS: NOT AT ALL
2. FEELING DOWN, DEPRESSED OR HOPELESS: NOT AT ALL
SUM OF ALL RESPONSES TO PHQ QUESTIONS 1-9: 0

## 2025-04-08 ASSESSMENT — ENCOUNTER SYMPTOMS: SHORTNESS OF BREATH: 0

## 2025-04-08 NOTE — PATIENT INSTRUCTIONS
Return in 10 days for suture removal  Patient had Tdap within last 10 years  Discussed signs and symptoms of infection  Keep area clean and dry  Keep covered with bandage   May use tylenol or ibuprofen for pain  Patient verbalized understanding

## 2025-04-08 NOTE — PROGRESS NOTES
tissue, and sutured with 2 sutures of 5-0 vicryl. Antibiotic ointment and dressing applied.  Wound care instructions provided.  Observe for any signs of infection or other problems.  Return for suture removal in 10 days.     Patient is a 35 yo male who presents today due to hand injury. The patient was at work when he cut his left hand on a piece of metal. The incident occurred at approximately 3 pm. The laceration is 1.5 cm in length with straight edges. No foreign bodies were observed on examination. The patient's laceration was cleaned with chlorhexidine and disinfected with betadine before suture placement. Two sutures were placed into the laceration. The patient tolerated the procedure well. Antibiotic ointment was applied and the laceration was dressed by a member of clinical staff. The patient was advised to watch for signs of infection. The patient was provided with information on wound care and the information was gone over at length. Advised the patient to return to PCP or walk in in 10 days for suture removal. Patient agrees with plan of care. Advised patient to follow up with PCP or walk in if symptoms persist or worsen.        Plan:   Assessment & Plan   Return if symptoms worsen or fail to improve.     No orders of the defined types were placed in this encounter.    Orders Placed This Encounter   Medications    lidocaine PF 1 % injection 5 mL        Patient given educational materials - see patient instructions. Discussed use, benefits, and side effects of prescribed medications with patient. All patient questions answered and patient verbalized understanding. Instructed to continue current medications, diet and exercise. Patient agreed with the treatment plan. Encouraged patient to follow up with PCP or return to the clinic for no improvement and or worsening of symptoms.    Electronically signed by Melanie Garcia PA-C on 4/8/2025 at 5:13 PM

## 2025-04-18 ENCOUNTER — OFFICE VISIT (OUTPATIENT)
Dept: PRIMARY CARE CLINIC | Age: 35
End: 2025-04-18
Payer: COMMERCIAL

## 2025-04-18 VITALS
BODY MASS INDEX: 31.66 KG/M2 | WEIGHT: 260 LBS | DIASTOLIC BLOOD PRESSURE: 78 MMHG | OXYGEN SATURATION: 97 % | TEMPERATURE: 97.7 F | HEIGHT: 76 IN | HEART RATE: 74 BPM | RESPIRATION RATE: 16 BRPM | SYSTOLIC BLOOD PRESSURE: 108 MMHG

## 2025-04-18 DIAGNOSIS — Z48.02 VISIT FOR SUTURE REMOVAL: Primary | ICD-10-CM

## 2025-04-18 PROCEDURE — 99212 OFFICE O/P EST SF 10 MIN: CPT | Performed by: PHYSICIAN ASSISTANT

## 2025-04-18 NOTE — PROGRESS NOTES
Suture Removal    Date/Time: 4/18/2025 10:30 AM    Performed by: Melanie Garcia PA-C  Authorized by: Melanie Garcia PA-C    Consent:     Consent obtained:  Verbal    Consent given by:  Patient    Risks, benefits, and alternatives were discussed: yes      Risks discussed:  Wound separation, pain and bleeding    Alternatives discussed:  Observation  Universal protocol:     Procedure explained and questions answered to patient or proxy's satisfaction: yes    Location:     Location: left hand.  Procedure details:     Wound appearance:  No signs of infection, clean and good wound healing    Number of sutures removed:  2  Post-procedure details:     Post-removal:  No dressing applied    Procedure completion:  Tolerated well, no immediate complications  Comments:      Patient presents for removal of sutures placed on 4/8/2025. The wound was well healed with no signs of infection, draining, or dehiscence. Patient tolerated removal well.

## 2025-04-18 NOTE — PATIENT INSTRUCTIONS
Keep area clean and dry  Apply vaseline as needed   Avoid overly strenuous activities that may stress the wound area until fully healed  Follow up with PCP or walk in if signs of wound reopening or infection

## 2025-05-02 ENCOUNTER — HOSPITAL ENCOUNTER (OUTPATIENT)
Age: 35
Discharge: HOME | End: 2025-05-02

## 2025-05-02 DIAGNOSIS — Z00.00 ANNUAL PHYSICAL EXAM: ICD-10-CM

## 2025-05-02 DIAGNOSIS — Z13.1 DIABETES MELLITUS SCREENING: ICD-10-CM

## 2025-05-02 LAB
ALBUMIN SERPL-MCNC: 4.5 G/DL (ref 3.5–5.2)
ALBUMIN/GLOB SERPL: 1.5 {RATIO} (ref 1–2.5)
ALP SERPL-CCNC: 76 U/L (ref 40–129)
ALT SERPL-CCNC: 30 U/L (ref 10–50)
ANION GAP SERPL CALCULATED.3IONS-SCNC: 12 MMOL/L (ref 9–16)
AST SERPL-CCNC: 26 U/L (ref 10–50)
BILIRUB SERPL-MCNC: 1.7 MG/DL (ref 0–1.2)
BUN SERPL-MCNC: 12 MG/DL (ref 6–20)
BUN/CREAT SERPL: 10 (ref 9–20)
CALCIUM SERPL-MCNC: 9.5 MG/DL (ref 8.6–10.4)
CHLORIDE SERPL-SCNC: 104 MMOL/L (ref 98–107)
CHOLEST SERPL-MCNC: 165 MG/DL (ref 0–199)
CHOLESTEROL/HDL RATIO: 2.9
CO2 SERPL-SCNC: 23 MMOL/L (ref 20–31)
CREAT SERPL-MCNC: 1.2 MG/DL (ref 0.7–1.2)
ERYTHROCYTE [DISTWIDTH] IN BLOOD BY AUTOMATED COUNT: 13.5 % (ref 11.8–14.4)
EST. AVERAGE GLUCOSE BLD GHB EST-MCNC: 105 MG/DL
GFR, ESTIMATED: 82 ML/MIN/1.73M2
GLUCOSE SERPL-MCNC: 96 MG/DL (ref 74–99)
HBA1C MFR BLD: 5.3 % (ref 4–6)
HCT VFR BLD AUTO: 41.8 % (ref 40.7–50.3)
HDLC SERPL-MCNC: 56 MG/DL
HGB BLD-MCNC: 14.5 G/DL (ref 13–17)
LDLC SERPL CALC-MCNC: 90 MG/DL (ref 0–100)
MCH RBC QN AUTO: 28.1 PG (ref 25.2–33.5)
MCHC RBC AUTO-ENTMCNC: 34.7 G/DL (ref 25.2–33.5)
MCV RBC AUTO: 81 FL (ref 82.6–102.9)
NRBC BLD-RTO: 0 PER 100 WBC
PLATELET # BLD AUTO: 238 K/UL (ref 138–453)
PMV BLD AUTO: 10.3 FL (ref 8.1–13.5)
POTASSIUM SERPL-SCNC: 3.9 MMOL/L (ref 3.7–5.3)
PROT SERPL-MCNC: 7.5 G/DL (ref 6.6–8.7)
RBC # BLD AUTO: 5.16 M/UL (ref 4.21–5.77)
SODIUM SERPL-SCNC: 139 MMOL/L (ref 136–145)
TRIGL SERPL-MCNC: 94 MG/DL
TSH SERPL DL<=0.05 MIU/L-ACNC: 0.49 UIU/ML (ref 0.27–4.2)
VLDLC SERPL CALC-MCNC: 19 MG/DL (ref 1–30)
WBC OTHER # BLD: 4.7 K/UL (ref 3.5–11.3)

## 2025-05-02 PROCEDURE — 84443 ASSAY THYROID STIM HORMONE: CPT

## 2025-05-02 PROCEDURE — 85027 COMPLETE CBC AUTOMATED: CPT

## 2025-05-02 PROCEDURE — 36415 COLL VENOUS BLD VENIPUNCTURE: CPT

## 2025-05-02 PROCEDURE — 80053 COMPREHEN METABOLIC PANEL: CPT

## 2025-05-02 PROCEDURE — 83036 HEMOGLOBIN GLYCOSYLATED A1C: CPT

## 2025-05-02 PROCEDURE — 80061 LIPID PANEL: CPT

## 2025-05-03 ENCOUNTER — RESULTS FOLLOW-UP (OUTPATIENT)
Dept: FAMILY MEDICINE CLINIC | Age: 35
End: 2025-05-03

## 2025-05-30 ENCOUNTER — OFFICE VISIT (OUTPATIENT)
Dept: FAMILY MEDICINE CLINIC | Age: 35
End: 2025-05-30
Payer: COMMERCIAL

## 2025-05-30 VITALS
SYSTOLIC BLOOD PRESSURE: 124 MMHG | WEIGHT: 256 LBS | HEART RATE: 76 BPM | HEIGHT: 76 IN | BODY MASS INDEX: 31.17 KG/M2 | OXYGEN SATURATION: 98 % | DIASTOLIC BLOOD PRESSURE: 80 MMHG

## 2025-05-30 DIAGNOSIS — I10 HYPERTENSION, UNSPECIFIED TYPE: ICD-10-CM

## 2025-05-30 DIAGNOSIS — M79.673 CHRONIC FOOT PAIN, UNSPECIFIED LATERALITY: ICD-10-CM

## 2025-05-30 DIAGNOSIS — K50.10 CROHN'S DISEASE OF COLON WITHOUT COMPLICATION (HCC): ICD-10-CM

## 2025-05-30 DIAGNOSIS — R11.0 CHRONIC NAUSEA: ICD-10-CM

## 2025-05-30 DIAGNOSIS — G89.29 CHRONIC FOOT PAIN, UNSPECIFIED LATERALITY: ICD-10-CM

## 2025-05-30 PROCEDURE — 99214 OFFICE O/P EST MOD 30 MIN: CPT | Performed by: FAMILY MEDICINE

## 2025-05-30 PROCEDURE — 3074F SYST BP LT 130 MM HG: CPT | Performed by: FAMILY MEDICINE

## 2025-05-30 PROCEDURE — 3079F DIAST BP 80-89 MM HG: CPT | Performed by: FAMILY MEDICINE

## 2025-05-30 RX ORDER — PROMETHAZINE HYDROCHLORIDE 6.25 MG/5ML
6.25 SYRUP ORAL EVERY 6 HOURS PRN
Qty: 118 ML | Refills: 0 | Status: SHIPPED | OUTPATIENT
Start: 2025-05-30 | End: 2025-06-06

## 2025-05-30 SDOH — ECONOMIC STABILITY: FOOD INSECURITY: WITHIN THE PAST 12 MONTHS, THE FOOD YOU BOUGHT JUST DIDN'T LAST AND YOU DIDN'T HAVE MONEY TO GET MORE.: NEVER TRUE

## 2025-05-30 SDOH — ECONOMIC STABILITY: FOOD INSECURITY: WITHIN THE PAST 12 MONTHS, YOU WORRIED THAT YOUR FOOD WOULD RUN OUT BEFORE YOU GOT MONEY TO BUY MORE.: NEVER TRUE

## 2025-05-30 NOTE — PROGRESS NOTES
HPI:  Patient comes in today for   Chief Complaint   Patient presents with    Foot Pain     Pt is here for right foot numbness. Pt states it has been like this for awhile.     Nausea     Pt states that he has been having flair ups crohn's    Here here with c/o Pain in both his feet  has had  h/o old left  5 th metatarsal fracture surgery in 2022 . H/o HTN is on Norvasc at 10 mg now  BP sometimes runs high at home.H/O crohn's with intermittent adominal pain, nausea and emesis ,no blood in stools.Phenergan liquid  helps with nausea and has been using it  Chronically.Was seeing  Gastroenterology at [facility] but has not followed up with gastroenterology fo a long time since he moved  his last colonoscopy was last year    HISTORY:  Past Medical History:   Diagnosis Date    Crohn's colitis (HCC)        Past Surgical History:   Procedure Laterality Date    COLON SURGERY      FOOT SURGERY Left 1/18/2022    ORIF Left 5th Metatarsal performed by Brendan Sanders MD at [facility] OR    HERNIA REPAIR          Family History   Problem Relation Age of Onset    Hypertension Father     Crohn's Disease Maternal Aunt        Social History     Socioeconomic History    Marital status:      Spouse name: Not on file    Number of children: Not on file    Years of education: Not on file    Highest education level: Not on file   Occupational History    Not on file   Tobacco Use    Smoking status: Former     Current packs/day: 0.50     Types: Cigarettes     Passive exposure: Past    Smokeless tobacco: Never   Vaping Use    Vaping status: Never Used   Substance and Sexual Activity    Alcohol use: Yes     Comment: socially    Drug use: Not Currently    Sexual activity: Not on file   Other Topics Concern    Not on file   Social History Narrative    Not on file     Social Drivers of Health     Financial Resource Strain: Low Risk  (11/27/2024)    Overall Financial Resource Strain (CARDIA)     Difficulty of Paying Living Expenses: Not

## 2025-06-16 RX ORDER — AMLODIPINE BESYLATE 2.5 MG/1
2.5 TABLET ORAL DAILY
COMMUNITY
End: 2025-06-16 | Stop reason: SDUPTHER

## 2025-06-16 NOTE — TELEPHONE ENCOUNTER
Bryant Mattson is requesting a refill on the following medication(s):  Requested Prescriptions     Pending Prescriptions Disp Refills    amLODIPine (NORVASC) 2.5 MG tablet 30 tablet      Sig: Take 1 tablet by mouth daily       Last Visit Date (If Applicable):  5/30/2025    Next Visit Date:    Visit date not found

## 2025-06-17 RX ORDER — AMLODIPINE BESYLATE 2.5 MG/1
2.5 TABLET ORAL DAILY
Qty: 90 TABLET | Refills: 0 | Status: SHIPPED | OUTPATIENT
Start: 2025-06-17

## 2025-07-08 ENCOUNTER — TELEPHONE (OUTPATIENT)
Dept: GASTROENTEROLOGY | Age: 35
End: 2025-07-08

## (undated) DEVICE — PADDING CAST W4INXL4YD ST COT COHESIVE HND TEARABLE SPEC

## (undated) DEVICE — SKIN AFFIX SURG ADHESIVE 72/CS 0.55ML: Brand: MEDLINE

## (undated) DEVICE — ADAPTER ELBW PORTED 22X22 MM STD CONN ACCEPT MOST TEMP PRB

## (undated) DEVICE — GLOVE SURG SZ 9 THK91MIL LTX FREE SYN POLYISOPRENE ANTI

## (undated) DEVICE — 4-PORT MANIFOLD: Brand: NEPTUNE 2

## (undated) DEVICE — BANDAGE COMPR W6INXL5YD WHT BGE POLY COT M E WRP WV HK AND

## (undated) DEVICE — PADDING CAST W6INXL4YD COT LO LINTING WYTEX

## (undated) DEVICE — COVER LT HNDL BLU PLAS

## (undated) DEVICE — Device

## (undated) DEVICE — PACK PROCEDURE SURG EXTREMITY MIN

## (undated) DEVICE — LINER GLOVEXL RED CUT RESIST STRL REPEL LT

## (undated) DEVICE — SUTURE VCRL 2-0 L27IN ABSRB UD CP-2 L26MM 1/2 CIR REV CUT J869H

## (undated) DEVICE — 3M™ COBAN™ NL STERILE NON-LATEX SELF-ADHERENT WRAP, 2084S, 4 IN X 5 YD (10 CM X 4,5 M), 18 ROLLS/CASE: Brand: 3M™ COBAN™

## (undated) DEVICE — PACK SURG PROC REMINDER N WVN DISPOSABLE BEAC TIME OUT

## (undated) DEVICE — SHOE POSTOP XL M 12.5+ WOM 13.5+ SQUARED OPN TOE CLS HEEL

## (undated) DEVICE — GLOVE ORANGE PI 8 1/2   MSG9085

## (undated) DEVICE — TUBING, SUCTION, 3/16" X 20', STRAIGHT: Brand: MEDLINE

## (undated) DEVICE — EZE-BAND LF ST 4X5.5 36/CS: Brand: EZE-BAND LF ST 4X5.5 36/CS

## (undated) DEVICE — WEBRIL* CAST PADDING: Brand: DEROYAL

## (undated) DEVICE — GOWN,AURORA,NON-REINFORCED,2XL: Brand: MEDLINE

## (undated) DEVICE — DRAPE,U/ SHT,SPLIT,PLAS,STERIL: Brand: MEDLINE

## (undated) DEVICE — 3M™ WARMING BLANKET, UPPER BODY, 10 PER CASE, 42268: Brand: BAIR HUGGER™

## (undated) DEVICE — 9165 UNIVERSAL PATIENT PLATE: Brand: 3M™

## (undated) DEVICE — BASIN EMESIS 500CC ROSE 250/CS 60/PLT: Brand: MEDEGEN MEDICAL PRODUCTS, LLC

## (undated) DEVICE — CHLORAPREP 26ML ORANGE

## (undated) DEVICE — GLOVE SURG SZ 6 THK91MIL LTX FREE SYN POLYISOPRENE ANTI

## (undated) DEVICE — GLOVE ORANGE PI 7   MSG9070

## (undated) DEVICE — 1200CC GUARDIAN II: Brand: GUARDIAN

## (undated) DEVICE — SYRINGE BLB 2 PC STER 50

## (undated) DEVICE — INTENDED FOR TISSUE SEPARATION, AND OTHER PROCEDURES THAT REQUIRE A SHARP SURGICAL BLADE TO PUNCTURE OR CUT.: Brand: BARD-PARKER ® CARBON RIB-BACK BLADES

## (undated) DEVICE — PENCIL ES L3M BTTN SWCH HOLSTER W/ BLDE ELECTRD EDGE

## (undated) DEVICE — GAUZE,SPONGE,FLUFF,6"X6.75",STRL,5/TRAY: Brand: MEDLINE

## (undated) DEVICE — SCRUB DRY SURG EZ SCRUB BRUSH PREOPERATIVE GRN

## (undated) DEVICE — TOWEL,OR,DSP,ST,BLUE,STD,4/PK,20PK/CS: Brand: MEDLINE

## (undated) DEVICE — 450 ML BOTTLE OF 0.05% CHLORHEXIDINE GLUCONATE IN 99.95% STERILE WATER FOR IRRIGATION, USP AND APPLICATOR.: Brand: IRRISEPT ANTIMICROBIAL WOUND LAVAGE

## (undated) DEVICE — SUTURE ETHLN SZ 3-0 L30IN NONABSORBABLE BLK L36MM FSLX 3/8 1673BH